# Patient Record
Sex: MALE | Race: WHITE | NOT HISPANIC OR LATINO | Employment: FULL TIME | URBAN - METROPOLITAN AREA
[De-identification: names, ages, dates, MRNs, and addresses within clinical notes are randomized per-mention and may not be internally consistent; named-entity substitution may affect disease eponyms.]

---

## 2017-07-25 ENCOUNTER — APPOINTMENT (EMERGENCY)
Dept: CT IMAGING | Facility: HOSPITAL | Age: 32
DRG: 465 | End: 2017-07-25
Payer: COMMERCIAL

## 2017-07-25 ENCOUNTER — HOSPITAL ENCOUNTER (INPATIENT)
Facility: HOSPITAL | Age: 32
LOS: 1 days | Discharge: HOME | DRG: 465 | End: 2017-07-27
Attending: EMERGENCY MEDICINE | Admitting: GENERAL PRACTICE
Payer: COMMERCIAL

## 2017-07-25 DIAGNOSIS — N20.0 NEPHROLITHIASIS: Primary | ICD-10-CM

## 2017-07-25 DIAGNOSIS — N13.30 HYDRONEPHROSIS, LEFT: ICD-10-CM

## 2017-07-25 DIAGNOSIS — R52 INTRACTABLE PAIN: ICD-10-CM

## 2017-07-25 DIAGNOSIS — N20.0 NEPHROLITHIASIS: ICD-10-CM

## 2017-07-25 PROBLEM — N20.1 URETERIC STONE: Status: ACTIVE | Noted: 2017-07-25

## 2017-07-25 PROBLEM — I10 ACCELERATED HYPERTENSION: Status: ACTIVE | Noted: 2017-07-25

## 2017-07-25 PROBLEM — J45.909 ASTHMA: Status: ACTIVE | Noted: 2017-07-25

## 2017-07-25 PROBLEM — N17.9 AKI (ACUTE KIDNEY INJURY) (HCC): Status: ACTIVE | Noted: 2017-07-25

## 2017-07-25 PROBLEM — E66.01 MORBID OBESITY (HCC): Status: ACTIVE | Noted: 2017-07-25

## 2017-07-25 LAB
ALBUMIN SERPL BCP-MCNC: 4.2 G/DL (ref 3.5–5)
ALP SERPL-CCNC: 60 U/L (ref 46–116)
ALT SERPL W P-5'-P-CCNC: 80 U/L (ref 12–78)
ANION GAP SERPL CALCULATED.3IONS-SCNC: 12 MMOL/L (ref 4–13)
AST SERPL W P-5'-P-CCNC: 45 U/L (ref 5–45)
BACTERIA UR QL AUTO: NORMAL /HPF
BASOPHILS # BLD AUTO: 0.05 THOUSANDS/ΜL (ref 0–0.1)
BASOPHILS NFR BLD AUTO: 0 % (ref 0–1)
BILIRUB SERPL-MCNC: 0.4 MG/DL (ref 0.2–1)
BILIRUB UR QL STRIP: NEGATIVE
BUN SERPL-MCNC: 13 MG/DL (ref 5–25)
CALCIUM SERPL-MCNC: 9.6 MG/DL (ref 8.3–10.1)
CHLORIDE SERPL-SCNC: 101 MMOL/L (ref 100–108)
CLARITY UR: CLEAR
CO2 SERPL-SCNC: 27 MMOL/L (ref 21–32)
COLOR UR: YELLOW
CREAT SERPL-MCNC: 1.24 MG/DL (ref 0.6–1.3)
EOSINOPHIL # BLD AUTO: 0.04 THOUSAND/ΜL (ref 0–0.61)
EOSINOPHIL NFR BLD AUTO: 0 % (ref 0–6)
ERYTHROCYTE [DISTWIDTH] IN BLOOD BY AUTOMATED COUNT: 12.5 % (ref 11.6–15.1)
GFR SERPL CREATININE-BSD FRML MDRD: 78 ML/MIN/1.73SQ M
GLUCOSE SERPL-MCNC: 155 MG/DL (ref 65–140)
GLUCOSE UR STRIP-MCNC: NEGATIVE MG/DL
HCT VFR BLD AUTO: 43.2 % (ref 36.5–49.3)
HGB BLD-MCNC: 14.4 G/DL (ref 12–17)
HGB UR QL STRIP.AUTO: ABNORMAL
KETONES UR STRIP-MCNC: NEGATIVE MG/DL
LEUKOCYTE ESTERASE UR QL STRIP: NEGATIVE
LIPASE SERPL-CCNC: 105 U/L (ref 73–393)
LYMPHOCYTES # BLD AUTO: 1.75 THOUSANDS/ΜL (ref 0.6–4.47)
LYMPHOCYTES NFR BLD AUTO: 15 % (ref 14–44)
MCH RBC QN AUTO: 28 PG (ref 26.8–34.3)
MCHC RBC AUTO-ENTMCNC: 33.3 G/DL (ref 31.4–37.4)
MCV RBC AUTO: 84 FL (ref 82–98)
MONOCYTES # BLD AUTO: 0.62 THOUSAND/ΜL (ref 0.17–1.22)
MONOCYTES NFR BLD AUTO: 5 % (ref 4–12)
NEUTROPHILS # BLD AUTO: 9.39 THOUSANDS/ΜL (ref 1.85–7.62)
NEUTS SEG NFR BLD AUTO: 79 % (ref 43–75)
NITRITE UR QL STRIP: NEGATIVE
NON-SQ EPI CELLS URNS QL MICRO: NORMAL /HPF
NRBC BLD AUTO-RTO: 0 /100 WBCS
PH UR STRIP.AUTO: 5.5 [PH] (ref 4.5–8)
PLATELET # BLD AUTO: 375 THOUSANDS/UL (ref 149–390)
PMV BLD AUTO: 9.7 FL (ref 8.9–12.7)
POTASSIUM SERPL-SCNC: 4.3 MMOL/L (ref 3.5–5.3)
PROT SERPL-MCNC: 8.9 G/DL (ref 6.4–8.2)
PROT UR STRIP-MCNC: ABNORMAL MG/DL
RBC # BLD AUTO: 5.15 MILLION/UL (ref 3.88–5.62)
RBC #/AREA URNS AUTO: NORMAL /HPF
SODIUM SERPL-SCNC: 140 MMOL/L (ref 136–145)
SP GR UR STRIP.AUTO: >=1.03 (ref 1–1.03)
URATE CRY URNS QL MICRO: NORMAL /HPF
UROBILINOGEN UR QL STRIP.AUTO: 0.2 E.U./DL
WBC # BLD AUTO: 11.89 THOUSAND/UL (ref 4.31–10.16)
WBC #/AREA URNS AUTO: NORMAL /HPF

## 2017-07-25 PROCEDURE — 99285 EMERGENCY DEPT VISIT HI MDM: CPT

## 2017-07-25 PROCEDURE — 81001 URINALYSIS AUTO W/SCOPE: CPT | Performed by: PHYSICIAN ASSISTANT

## 2017-07-25 PROCEDURE — 96374 THER/PROPH/DIAG INJ IV PUSH: CPT

## 2017-07-25 PROCEDURE — 36415 COLL VENOUS BLD VENIPUNCTURE: CPT

## 2017-07-25 PROCEDURE — 96375 TX/PRO/DX INJ NEW DRUG ADDON: CPT

## 2017-07-25 PROCEDURE — 80053 COMPREHEN METABOLIC PANEL: CPT | Performed by: EMERGENCY MEDICINE

## 2017-07-25 PROCEDURE — 96376 TX/PRO/DX INJ SAME DRUG ADON: CPT

## 2017-07-25 PROCEDURE — 83690 ASSAY OF LIPASE: CPT | Performed by: EMERGENCY MEDICINE

## 2017-07-25 PROCEDURE — 85025 COMPLETE CBC W/AUTO DIFF WBC: CPT | Performed by: EMERGENCY MEDICINE

## 2017-07-25 PROCEDURE — 74176 CT ABD & PELVIS W/O CONTRAST: CPT

## 2017-07-25 RX ORDER — ONDANSETRON 2 MG/ML
4 INJECTION INTRAMUSCULAR; INTRAVENOUS EVERY 6 HOURS PRN
Status: DISCONTINUED | OUTPATIENT
Start: 2017-07-25 | End: 2017-07-27 | Stop reason: HOSPADM

## 2017-07-25 RX ORDER — KETOROLAC TROMETHAMINE 30 MG/ML
INJECTION, SOLUTION INTRAMUSCULAR; INTRAVENOUS
Status: COMPLETED
Start: 2017-07-25 | End: 2017-07-25

## 2017-07-25 RX ORDER — KETOROLAC TROMETHAMINE 30 MG/ML
15 INJECTION, SOLUTION INTRAMUSCULAR; INTRAVENOUS EVERY 6 HOURS SCHEDULED
Status: DISCONTINUED | OUTPATIENT
Start: 2017-07-25 | End: 2017-07-26

## 2017-07-25 RX ORDER — KETOROLAC TROMETHAMINE 30 MG/ML
15 INJECTION, SOLUTION INTRAMUSCULAR; INTRAVENOUS ONCE
Status: COMPLETED | OUTPATIENT
Start: 2017-07-25 | End: 2017-07-25

## 2017-07-25 RX ORDER — SODIUM CHLORIDE 9 MG/ML
150 INJECTION, SOLUTION INTRAVENOUS CONTINUOUS
Status: DISCONTINUED | OUTPATIENT
Start: 2017-07-25 | End: 2017-07-27 | Stop reason: HOSPADM

## 2017-07-25 RX ORDER — OXYCODONE HYDROCHLORIDE AND ACETAMINOPHEN 5; 325 MG/1; MG/1
1 TABLET ORAL EVERY 4 HOURS PRN
Status: DISCONTINUED | OUTPATIENT
Start: 2017-07-25 | End: 2017-07-27 | Stop reason: HOSPADM

## 2017-07-25 RX ORDER — TAMSULOSIN HYDROCHLORIDE 0.4 MG/1
0.4 CAPSULE ORAL
Status: DISCONTINUED | OUTPATIENT
Start: 2017-07-25 | End: 2017-07-27 | Stop reason: HOSPADM

## 2017-07-25 RX ORDER — ACETAMINOPHEN 325 MG/1
650 TABLET ORAL EVERY 6 HOURS PRN
Status: DISCONTINUED | OUTPATIENT
Start: 2017-07-25 | End: 2017-07-27 | Stop reason: HOSPADM

## 2017-07-25 RX ORDER — HEPARIN SODIUM 5000 [USP'U]/ML
5000 INJECTION, SOLUTION INTRAVENOUS; SUBCUTANEOUS EVERY 8 HOURS SCHEDULED
Status: DISCONTINUED | OUTPATIENT
Start: 2017-07-25 | End: 2017-07-27 | Stop reason: HOSPADM

## 2017-07-25 RX ORDER — GREEN TEA/HOODIA GORDONII 315-12.5MG
CAPSULE ORAL DAILY
Status: DISCONTINUED | OUTPATIENT
Start: 2017-07-26 | End: 2017-07-26

## 2017-07-25 RX ADMIN — HYDROMORPHONE HYDROCHLORIDE 1 MG: 1 INJECTION, SOLUTION INTRAMUSCULAR; INTRAVENOUS; SUBCUTANEOUS at 13:19

## 2017-07-25 RX ADMIN — SODIUM CHLORIDE 150 ML/HR: 0.9 INJECTION, SOLUTION INTRAVENOUS at 19:57

## 2017-07-25 RX ADMIN — KETOROLAC TROMETHAMINE 15 MG: 30 INJECTION, SOLUTION INTRAMUSCULAR; INTRAVENOUS at 13:18

## 2017-07-25 RX ADMIN — HYDROMORPHONE HYDROCHLORIDE 1 MG: 1 INJECTION, SOLUTION INTRAMUSCULAR; INTRAVENOUS; SUBCUTANEOUS at 17:10

## 2017-07-25 RX ADMIN — KETOROLAC TROMETHAMINE 15 MG: 30 INJECTION, SOLUTION INTRAMUSCULAR at 16:12

## 2017-07-25 RX ADMIN — TAMSULOSIN HYDROCHLORIDE 0.4 MG: 0.4 CAPSULE ORAL at 19:57

## 2017-07-25 RX ADMIN — HYDROMORPHONE HYDROCHLORIDE 1 MG: 1 INJECTION, SOLUTION INTRAMUSCULAR; INTRAVENOUS; SUBCUTANEOUS at 14:26

## 2017-07-25 RX ADMIN — ONDANSETRON 4 MG: 2 INJECTION INTRAMUSCULAR; INTRAVENOUS at 20:04

## 2017-07-25 RX ADMIN — Medication 1 MG: at 13:19

## 2017-07-25 RX ADMIN — HYDROMORPHONE HYDROCHLORIDE 0.5 MG: 1 INJECTION, SOLUTION INTRAMUSCULAR; INTRAVENOUS; SUBCUTANEOUS at 21:21

## 2017-07-25 RX ADMIN — KETOROLAC TROMETHAMINE 15 MG: 30 INJECTION, SOLUTION INTRAMUSCULAR at 23:46

## 2017-07-25 RX ADMIN — KETOROLAC TROMETHAMINE 15 MG: 30 INJECTION, SOLUTION INTRAMUSCULAR at 19:56

## 2017-07-25 RX ADMIN — OXYCODONE HYDROCHLORIDE AND ACETAMINOPHEN 1 TABLET: 5; 325 TABLET ORAL at 22:04

## 2017-07-25 RX ADMIN — KETOROLAC TROMETHAMINE 15 MG: 30 INJECTION, SOLUTION INTRAMUSCULAR at 13:18

## 2017-07-25 NOTE — ED PROVIDER NOTES
History  Chief Complaint   Patient presents with    Flank Pain     Left flank pain started this morning  Not relieved by anything, pt reports vomiting when he tried to drink water  This is a 45-year-old male patient presents to ER for evaluation of sudden onset left-sided flank pain  Woke him from sleep this morning  Pain is left upper flank  Has been constant since onset but waxes and wanes in intensity  At times he states he is able tolerate the pain at times doubles over  He's had nausea with vomiting  States he feels that he needs to urinate but is unable to  No burning with urination  No blood in the urine that he notes  Pain radiates into the groin  Feels a stabbing pain  10 out of 10 at maximal severity  These had this on for 4 prior occasions however normally subsides spontaneously  Patient never had evaluation for this in the past  No known history of kidney stones  Father has a history of renal carcinoma he is otherwise healthy  History provided by:  Patient   used: No    Flank Pain   Pain location:  L flank  Pain quality: stabbing    Pain radiation: left groin    Pain severity:  Severe  Onset quality:  Sudden  Duration:  3 hours  Timing:  Constant  Progression:  Waxing and waning  Chronicity:  New  Context: not alcohol use, not awakening from sleep, not diet changes, not eating, not laxative use, not medication withdrawal, not previous surgeries, not recent illness, not recent sexual activity, not recent travel, not retching, not sick contacts, not suspicious food intake and not trauma    Relieved by:  Nothing  Worsened by:  Nothing  Ineffective treatments:  None tried  Associated symptoms: nausea and vomiting    Associated symptoms: no anorexia, no belching, no chest pain, no chills, no constipation, no cough, no diarrhea, no dysuria, no fatigue, no fever, no flatus, no hematemesis, no hematochezia, no hematuria, no melena, no shortness of breath and no sore throat Risk factors: obesity    Risk factors: no alcohol abuse, no aspirin use, not elderly, has not had multiple surgeries, no NSAID use and no recent hospitalization        Prior to Admission Medications   Prescriptions Last Dose Informant Patient Reported? Taking? HAWTHORN BERRIES PO   Yes Yes   Sig: Take 60 mL by mouth daily      Facility-Administered Medications: None       Past Medical History:   Diagnosis Date    Asthma        History reviewed  No pertinent surgical history  Family History   Problem Relation Age of Onset    Family history unknown: Yes     I have reviewed and agree with the history as documented  Social History   Substance Use Topics    Smoking status: Never Smoker    Smokeless tobacco: Not on file    Alcohol use No        Review of Systems   Constitutional: Negative for activity change, appetite change, chills, diaphoresis, fatigue, fever and unexpected weight change  HENT: Negative for congestion, rhinorrhea, sinus pressure, sore throat and trouble swallowing  Eyes: Negative for photophobia and visual disturbance  Respiratory: Negative for apnea, cough, choking, chest tightness, shortness of breath, wheezing and stridor  Cardiovascular: Negative for chest pain, palpitations and leg swelling  Gastrointestinal: Positive for nausea and vomiting  Negative for abdominal distention, abdominal pain, anorexia, blood in stool, constipation, diarrhea, flatus, hematemesis, hematochezia and melena  Genitourinary: Positive for flank pain  Negative for decreased urine volume, difficulty urinating, dysuria, enuresis, frequency, hematuria and urgency  Musculoskeletal: Negative for arthralgias, myalgias, neck pain and neck stiffness  Skin: Negative for color change, pallor, rash and wound  Allergic/Immunologic: Negative  Neurological: Negative for dizziness, tremors, syncope, weakness, light-headedness, numbness and headaches  Hematological: Negative  Psychiatric/Behavioral: Negative  All other systems reviewed and are negative  Physical Exam  ED Triage Vitals [07/25/17 1244]   Temperature Pulse Respirations Blood Pressure SpO2   97 9 °F (36 6 °C) 67 20 161/87 98 %      Temp Source Heart Rate Source Patient Position BP Location FiO2 (%)   Oral Monitor Lying Right arm --      Pain Score       Worst Possible Pain           Physical Exam   Constitutional: He is oriented to person, place, and time  He appears well-developed and well-nourished  Non-toxic appearance  He does not have a sickly appearance  He does not appear ill  No distress  HENT:   Head: Normocephalic and atraumatic  Eyes: EOM and lids are normal  Pupils are equal, round, and reactive to light  Neck: Normal range of motion  Neck supple  Cardiovascular: Normal rate, regular rhythm, S1 normal, S2 normal, normal heart sounds, intact distal pulses and normal pulses  Exam reveals no gallop, no distant heart sounds, no friction rub and no decreased pulses  No murmur heard  Pulses:       Radial pulses are 2+ on the right side, and 2+ on the left side  Pulmonary/Chest: Effort normal and breath sounds normal  No accessory muscle usage  No apnea, no tachypnea and no bradypnea  No respiratory distress  He has no decreased breath sounds  He has no wheezes  He has no rhonchi  He has no rales  Abdominal: Soft  Normal appearance and bowel sounds are normal  He exhibits no distension and no mass  There is no tenderness  There is CVA tenderness (left)  There is no rigidity, no rebound and no guarding  No hernia  Left-sided CVA tenderness  No abdominal tenderness  Musculoskeletal: Normal range of motion  He exhibits no edema, tenderness or deformity  Neurological: He is alert and oriented to person, place, and time  No cranial nerve deficit  GCS eye subscore is 4  GCS verbal subscore is 5  GCS motor subscore is 6  Skin: Skin is warm, dry and intact  No rash noted   He is not diaphoretic  No erythema  No pallor  Psychiatric: His speech is normal    Nursing note and vitals reviewed        ED Medications  Medications   [place] CAPS (not administered)   sodium chloride 0 9 % infusion (not administered)   ondansetron (ZOFRAN) injection 4 mg (not administered)   heparin (porcine) subcutaneous injection 5,000 Units (not administered)   acetaminophen (TYLENOL) tablet 650 mg (not administered)   ketorolac (TORADOL) 30 mg/mL injection 15 mg (not administered)   HYDROmorphone (DILAUDID) 1 mg/mL injection 0 5 mg (not administered)   tamsulosin (FLOMAX) capsule 0 4 mg (not administered)   oxyCODONE-acetaminophen (PERCOCET) 5-325 mg per tablet 1 tablet (not administered)   HYDROmorphone (DILAUDID) 1 mg/mL injection 1 mg (1 mg Intravenous Given 7/25/17 1319)   ketorolac (TORADOL) 30 mg/mL injection 15 mg (15 mg Intravenous Given 7/25/17 1318)   HYDROmorphone (DILAUDID) 1 mg/mL injection 1 mg (1 mg Intravenous Given 7/25/17 1426)   ketorolac (TORADOL) 30 mg/mL injection 15 mg (15 mg Intravenous Given 7/25/17 1612)   HYDROmorphone (DILAUDID) 1 mg/mL injection 1 mg (1 mg Intravenous Given 7/25/17 1710)       Diagnostic Studies  Labs Reviewed   CBC AND DIFFERENTIAL - Abnormal        Result Value Ref Range Status    WBC 11 89 (*) 4 31 - 10 16 Thousand/uL Final    Neutrophils Relative 79 (*) 43 - 75 % Final    Neutrophils Absolute 9 39 (*) 1 85 - 7 62 Thousands/µL Final    RBC 5 15  3 88 - 5 62 Million/uL Final    Hemoglobin 14 4  12 0 - 17 0 g/dL Final    Hematocrit 43 2  36 5 - 49 3 % Final    MCV 84  82 - 98 fL Final    MCH 28 0  26 8 - 34 3 pg Final    MCHC 33 3  31 4 - 37 4 g/dL Final    RDW 12 5  11 6 - 15 1 % Final    MPV 9 7  8 9 - 12 7 fL Final    Platelets 451  563 - 390 Thousands/uL Final    nRBC 0  /100 WBCs Final    Lymphocytes Relative 15  14 - 44 % Final    Monocytes Relative 5  4 - 12 % Final    Eosinophils Relative 0  0 - 6 % Final    Basophils Relative 0  0 - 1 % Final Lymphocytes Absolute 1 75  0 60 - 4 47 Thousands/µL Final    Monocytes Absolute 0 62  0 17 - 1 22 Thousand/µL Final    Eosinophils Absolute 0 04  0 00 - 0 61 Thousand/µL Final    Basophils Absolute 0 05  0 00 - 0 10 Thousands/µL Final   COMPREHENSIVE METABOLIC PANEL - Abnormal     Glucose 155 (*) 65 - 140 mg/dL Final    Comment: If the patient is fasting, the ADA then defines impaired fasting glucose as > 100 mg/dL and diabetes as > or equal to 123 mg/dL  ALT 80 (*) 12 - 78 U/L Final    Total Protein 8 9 (*) 6 4 - 8 2 g/dL Final    Sodium 140  136 - 145 mmol/L Final    Potassium 4 3  3 5 - 5 3 mmol/L Final    Chloride 101  100 - 108 mmol/L Final    CO2 27  21 - 32 mmol/L Final    Anion Gap 12  4 - 13 mmol/L Final    BUN 13  5 - 25 mg/dL Final    Creatinine 1 24  0 60 - 1 30 mg/dL Final    Comment: Standardized to IDMS reference method    Calcium 9 6  8 3 - 10 1 mg/dL Final    AST 45  5 - 45 U/L Final    Alkaline Phosphatase 60  46 - 116 U/L Final    Albumin 4 2  3 5 - 5 0 g/dL Final    Total Bilirubin 0 40  0 20 - 1 00 mg/dL Final    eGFR 78  ml/min/1 73sq m Final    Narrative:     National Kidney Disease Education Program recommendations are as follows:  GFR calculation is accurate only with a steady state creatinine  Chronic Kidney disease less than 60 ml/min/1 73 sq  meters  Kidney failure less than 15 ml/min/1 73 sq  meters     UA W REFLEX TO MICROSCOPIC WITH REFLEX TO CULTURE - Abnormal     Protein, UA 30 (1+) (*) Negative mg/dl Final    Blood, UA Small (*) Negative Final    Color, UA Yellow   Final    Clarity, UA Clear   Final    Specific Gravity, UA >=1 030  1 003 - 1 030 Final    pH, UA 5 5  4 5 - 8 0 Final    Leukocytes, UA Negative  Negative Final    Nitrite, UA Negative  Negative Final    Glucose, UA Negative  Negative mg/dl Final    Ketones, UA Negative  Negative mg/dl Final    Urobilinogen, UA 0 2  0 2, 1 0 E U /dl E U /dl Final    Bilirubin, UA Negative  Negative Final   LIPASE - Normal    Lipase 105 73 - 393 u/L Final   URINE MICROSCOPIC    RBC, UA None Seen  None Seen /hpf Final    WBC, UA None Seen  None Seen /hpf Final    Epithelial Cells Occasional  None Seen, Occasional /hpf Final    Bacteria, UA None Seen  None Seen, Occasional /hpf Final    Uric Acid [place], UA Occasional  /hpf Final       CT renal stone study abdomen pelvis without contrast   Final Result      Mild left-sided hydronephrosis, the cause of which appears to be a 5 mm calculus within the proximal ureter  Bilateral subcentimeter nonobstructing intrarenal calculi  No free air or free fluid  Diffuse fatty infiltration of the liver  Workstation performed: [id number]             Procedures  Procedures      Phone Contacts  ED Phone Contact    ED Course  ED Course   Ramone Colon Documentation   Comment Time   Pain improved  Still mild pain 07/25 1423                               MDM  Number of Diagnoses or Management Options  Hydronephrosis, left: new and requires workup  Intractable pain: new and requires workup  Nephrolithiasis: new and requires workup  Diagnosis management comments: DDx including but not limited to: renal colic, pyelonephritis, UTI, GI etiology, appendicitis, diverticulitis, AAA, rhabdomyolysis, tumor  Amount and/or Complexity of Data Reviewed  Clinical lab tests: ordered and reviewed  Tests in the radiology section of CPT®: ordered and reviewed  Discuss the patient with other providers: yes  Independent visualization of images, tracings, or specimens: yes    Risk of Complications, Morbidity, and/or Mortality  Presenting problems: moderate  Management options: low  General comments: Vertigo with flank pain  Labs are unremarkable  Urine dip shows no signs of UTI  CT scan reveals left-sided 5 mm stone with mild hydro  Pain has been difficult to control  After 2 doses of Dilaudid he states his pain is only 6 out of 10   This compared to 10 out of 10 upon arrival  Discussed the case with urology  Urology explains that this could be a 50% chance the patient passed at this don't spontaneously  Given the patient required numerous doses of analgesia including Toradol and Dilaudid and concern the patient may have intractable pain and could have an obstructive stone  Patient will be admitted to the hospital  Urology states they will see the patient tomorrow in consult to evaluate for possible surgical intervention if needed  After speaking with internal medicine and it is agreed for observation admission  Patient is hemodynamically stable and in no distress at time of admission  Patient Progress  Patient progress: stable    CritCare Time    Disposition  Final diagnoses:   Nephrolithiasis - left   Hydronephrosis, left   Intractable pain     ED Disposition     ED Disposition Condition Comment    Admit  Case was discussed with Dr Oanh Peace and the patient's admission status was agreed to be Admission Status: observation status to the service of Dr Oanh Peace         Follow-up Information    None       Current Discharge Medication List      CONTINUE these medications which have NOT CHANGED    Details   HAWTHORN BERRIES PO Take 60 mL by mouth daily           No discharge procedures on file      ED Provider  Electronically Signed by       Melina Sorenson PA-C  07/25/17 6620

## 2017-07-25 NOTE — H&P
History and Physical - [facility] Internal Medicine    Patient Information: Karlee Joseph 29 y o  male MRN: [id number]  Unit/Bed#: -01 Encounter: [id number]  Admitting Physician: Marlen Dougherty MD  PCP: No primary care provider on file  Date of Admission:  07/25/17    Assessment/Plan:    Hospital Problem List:     Principal Problem:    Ureteric stone  Active Problems:    Hydronephrosis, left    Nephrolithiasis    ELINA (acute kidney injury)    Asthma    Morbid obesity    Accelerated hypertension    Present on Admission:   Ureteric stone   ELINA (acute kidney injury)   Asthma   Morbid obesity   Accelerated hypertension      Plan for the Primary Problem(s):  · Left ureteric stone causing left-sided hydronephrosis and severe left flank pain  Patient I believe has been having similar symptoms at least twice within the last one year  He also has kidney stones seen on the CAT scan  We'll control his pain with IV pain medications and oral if he isn't able to tolerate orally  The urology service was already called in from the ER  Would hydrate him with IV fluids  Would also put him on Flomax  Blood pressure on the higher side likely secondary to pain  Follow-up labs in the morning  May need intervention like cystoscopy/ureteroscopy with removal of the stone if it does not pass on its own or patient's symptoms get worse  I do not believe that he needs any IV antibiotics at this point  · asthma-currently stable  · Acute kidney injury  On IV fluids as above  · Accelerated hypertension secondary to pain  Was given some IV hydralazine p r n  For elevated blood pressures    Plan for Additional Problems:   · As above    VTE Prophylaxis: Heparin  / sequential compression device   Code Status: full code  POLST: There is no POLST form on file for this patient (pre-hospital)    Anticipated Length of Stay:  Patient will be admitted on an Observation basis with an anticipated length of stay of  Less than 2 midnights  Justification for Hospital Stay: left ureteric stone with hydronephrosis      Chief Complaint:   severe left flank pain    History of Present Illness:    Karlee Joseph is a 29 y o  male who presents with severe left flank pain  patient went to bed after work last night  He was woken up by the left flank pain  He had similar episode 6 months ago and 6 months before that although symptoms were not that bad and he got better area and this time his pain was severe and came to the ER  He also was nauseous and vomited couple of times  No hematemesis, melena, hematochezia  No hematuria  No fever or chills  His pain is at about t3-4/10 in intensity  It got better with pain medications  It was worse earlier  No chest pain  No shortness of breath  He has history of asthma and he has been taking some over-the-counter medication  He used to use albuterol in the past and has not required it for long time  No dizziness and has no headache          Review of Systems    All systems are reviewed  Positive as per history of presenting illness  Patient answered no to all other questions  Past Medical and Surgical History:     Past Medical History:   Diagnosis Date    Asthma        History reviewed  No pertinent surgical history  Meds/Allergies:    Prior to Admission medications    Medication Sig Start Date End Date Taking? 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  Patient Pre-hospital Living Situation: by himself  Patient Pre-hospital Level of Mobility:  independent  Patient Pre-hospital Diet Restrictions: none  Substance Use History:   History Alcohol Use No     History   Smoking Status    Never Smoker   Smokeless Tobacco    Not on file     History   Drug Use No       Family History: There is family history of kidney stones        Physical Exam      Vitals:   Blood Pressure: (!) 177/100 (07/25/17 1708)  Pulse: 78 (07/25/17 1708)  Temperature: 97 9 °F (36 6 °C) (07/25/17 1244)  Temp Source: Oral (07/25/17 1244)  Respirations: 20 (07/25/17 1708)  Weight - Scale: 136 kg (299 lb 13 2 oz) (07/25/17 1244)  SpO2: 96 % (07/25/17 1708)    Vital signs are reviewed as above  Constitutional: morbidly obese male who is laying in bed  He does not appear to be in any distressy  Eyes: EOM grossly intact  Conjunctivae slightly pale  Patient has anicteric sclera  HENT: Oropharynx are crowded and slightly dry  Did not notice any significant lesions on the tongue  Head normocephalic  Neck: Neck is obese and supple  Cardiac: I did not hear any rubs or gallop  Patient appears to be in sinus rhythm  Respiratory: Patient not in significant respiratory distress  Air entry in general is good  GI: Abdomen is  Obese and soft  He has left flank tenderness    Bowel sounds are adequate  I was not able to appreciate any hepatosplenomegaly  Neurologic:  Patient is awake and alert  Neurological examination is grossly intact  No obvious focal neurological deficit noticed  Skin: Skin is warm and dry  Psychiatric: Mood and affect are pleasant  Musculoskeletal  Patient moving all extremities   Extremities: Patient has no significant cyanosis, clubbing, or lower extremity edema           Additional Data:     Lab Results: I have personally reviewed pertinent reports          Results from last 7 days  Lab Units 07/25/17  1309   WBC Thousand/uL 11 89*   HEMOGLOBIN g/dL 14 4   HEMATOCRIT % 43 2   PLATELETS Thousands/uL 375   NEUTROS PCT % 79*   LYMPHS PCT % 15   MONOS PCT % 5   EOS PCT % 0       Results from last 7 days  Lab Units 07/25/17  1309   SODIUM mmol/L 140   POTASSIUM mmol/L 4  3   CHLORIDE mmol/L 101   CO2 mmol/L 27   BUN mg/dL 13   CREATININE mg/dL 1 24   CALCIUM mg/dL 9 6   TOTAL PROTEIN g/dL 8 9*   BILIRUBIN TOTAL mg/dL 0 40   ALK PHOS U/L 60   ALT U/L 80*   AST U/L 45   GLUCOSE RANDOM mg/dL 155*           Imaging: I have personally reviewed pertinent reports  Ct Renal Stone Study Abdomen Pelvis Without Contrast    Result Date: 7/25/2017  Narrative: CT ABDOMEN AND PELVIS WITHOUT IV CONTRAST - LOW DOSE RENAL STONE INDICATION: Left flank pain  COMPARISON: None  TECHNIQUE:  Low dose thin section CT examination of the abdomen and pelvis was performed without intravenous or oral contrast according to a protocol specifically designed to evaluate for urinary tract calculus  Reformatted images were created in axial,  sagittal, and coronal planes  Evaluation for pathology in the abdomen and pelvis that is unrelated to urinary tract calculi is limited  Radiation dose length product (DLP) for this visit:  928 mGy-cm   This examination, like all CT scans performed in the [facility], was performed utilizing techniques to minimize radiation dose exposure, including the use of iterative reconstruction and automated exposure control  FINDINGS: RIGHT KIDNEY AND URETER: There is a 4 mm nonobstructing calculus at the lower pole  No hydronephrosis or hydroureter  No perinephric collection  LEFT KIDNEY AND URETER: There is mild left-sided hydronephrosis, the cause of which appears to be a 5 mm calculus within the proximal ureter, 2 3 cm distal to the ureteropelvic junction  There is a 1 mm nonobstructing calculus at the lower pole  A 3 mm nonobstructing calculus at the midpole No perinephric collection  URINARY BLADDER: Unremarkable  No significant abnormality in the visualized lung bases  Limited low radiation dose noncontrast CT evaluation demonstrates no clinically significant abnormality of spleen, pancreas, or adrenal glands    There is diffuse fatty infiltration of the liver  No calcified gallstones or gallbladder wall thickening noted  No bowel obstruction  No ascites or lymphadenopathy  The appendix is well seen and there is no evidence of acute appendicitis  There is a tiny fat-containing umbilical hernia  No acute fracture or destructive osseous lesion is identified  There is a 15 mm lucency at the posterior aspect of the L4 vertebral body since is fat density and compatible with a hemangioma  Impression: Mild left-sided hydronephrosis, the cause of which appears to be a 5 mm calculus within the proximal ureter  Bilateral subcentimeter nonobstructing intrarenal calculi  No free air or free fluid  Diffuse fatty infiltration of the liver  Workstation performed: [id number]         [employer] Records Reviewed: No     ** Please Note: Dragon 360 Dictation voice to text software may have been used in the creation of this document   **

## 2017-07-26 ENCOUNTER — APPOINTMENT (OUTPATIENT)
Dept: RADIOLOGY | Facility: HOSPITAL | Age: 32
DRG: 465 | End: 2017-07-26
Payer: COMMERCIAL

## 2017-07-26 ENCOUNTER — ANESTHESIA (OUTPATIENT)
Dept: PERIOP | Facility: HOSPITAL | Age: 32
DRG: 465 | End: 2017-07-26
Payer: COMMERCIAL

## 2017-07-26 ENCOUNTER — ANESTHESIA EVENT (OUTPATIENT)
Dept: PERIOP | Facility: HOSPITAL | Age: 32
DRG: 465 | End: 2017-07-26
Payer: COMMERCIAL

## 2017-07-26 LAB
ANION GAP SERPL CALCULATED.3IONS-SCNC: 9 MMOL/L (ref 4–13)
BUN SERPL-MCNC: 18 MG/DL (ref 5–25)
CALCIUM SERPL-MCNC: 8.9 MG/DL (ref 8.3–10.1)
CHLORIDE SERPL-SCNC: 103 MMOL/L (ref 100–108)
CO2 SERPL-SCNC: 28 MMOL/L (ref 21–32)
CREAT SERPL-MCNC: 1.51 MG/DL (ref 0.6–1.3)
ERYTHROCYTE [DISTWIDTH] IN BLOOD BY AUTOMATED COUNT: 12.8 % (ref 11.6–15.1)
EST. AVERAGE GLUCOSE BLD GHB EST-MCNC: 126 MG/DL
GFR SERPL CREATININE-BSD FRML MDRD: 61 ML/MIN/1.73SQ M
GLUCOSE P FAST SERPL-MCNC: 130 MG/DL (ref 65–99)
GLUCOSE SERPL-MCNC: 130 MG/DL (ref 65–140)
HBA1C MFR BLD: 6 % (ref 4.2–6.3)
HCT VFR BLD AUTO: 42.1 % (ref 36.5–49.3)
HGB BLD-MCNC: 13.6 G/DL (ref 12–17)
MCH RBC QN AUTO: 27.8 PG (ref 26.8–34.3)
MCHC RBC AUTO-ENTMCNC: 32.3 G/DL (ref 31.4–37.4)
MCV RBC AUTO: 86 FL (ref 82–98)
PLATELET # BLD AUTO: 358 THOUSANDS/UL (ref 149–390)
PMV BLD AUTO: 10.3 FL (ref 8.9–12.7)
POTASSIUM SERPL-SCNC: 4.3 MMOL/L (ref 3.5–5.3)
RBC # BLD AUTO: 4.9 MILLION/UL (ref 3.88–5.62)
SODIUM SERPL-SCNC: 140 MMOL/L (ref 136–145)
TSH SERPL DL<=0.05 MIU/L-ACNC: 0.55 UIU/ML (ref 0.36–3.74)
WBC # BLD AUTO: 14.7 THOUSAND/UL (ref 4.31–10.16)

## 2017-07-26 PROCEDURE — 84443 ASSAY THYROID STIM HORMONE: CPT | Performed by: INTERNAL MEDICINE

## 2017-07-26 PROCEDURE — 74420 UROGRAPHY RTRGR +-KUB: CPT

## 2017-07-26 PROCEDURE — 87086 URINE CULTURE/COLONY COUNT: CPT | Performed by: UROLOGY

## 2017-07-26 PROCEDURE — 83036 HEMOGLOBIN GLYCOSYLATED A1C: CPT | Performed by: GENERAL PRACTICE

## 2017-07-26 PROCEDURE — 0T778DZ DILATION OF LEFT URETER WITH INTRALUMINAL DEVICE, VIA NATURAL OR ARTIFICIAL OPENING ENDOSCOPIC: ICD-10-PCS | Performed by: UROLOGY

## 2017-07-26 PROCEDURE — 85027 COMPLETE CBC AUTOMATED: CPT | Performed by: INTERNAL MEDICINE

## 2017-07-26 PROCEDURE — C1758 CATHETER, URETERAL: HCPCS | Performed by: UROLOGY

## 2017-07-26 PROCEDURE — C1769 GUIDE WIRE: HCPCS | Performed by: UROLOGY

## 2017-07-26 PROCEDURE — C2617 STENT, NON-COR, TEM W/O DEL: HCPCS | Performed by: UROLOGY

## 2017-07-26 PROCEDURE — 80048 BASIC METABOLIC PNL TOTAL CA: CPT | Performed by: INTERNAL MEDICINE

## 2017-07-26 DEVICE — STENT URETERAL 6 FR 28CM INLAY OPTIMA: Type: IMPLANTABLE DEVICE | Site: URETER | Status: FUNCTIONAL

## 2017-07-26 RX ORDER — CIPROFLOXACIN 500 MG/1
500 TABLET, FILM COATED ORAL 2 TIMES DAILY
Status: DISCONTINUED | OUTPATIENT
Start: 2017-07-26 | End: 2017-07-27 | Stop reason: HOSPADM

## 2017-07-26 RX ORDER — DIPHENOXYLATE HYDROCHLORIDE AND ATROPINE SULFATE 2.5; .025 MG/1; MG/1
1 TABLET ORAL DAILY
COMMUNITY
End: 2017-08-17

## 2017-07-26 RX ORDER — HYDROCODONE BITARTRATE AND ACETAMINOPHEN 5; 325 MG/1; MG/1
2 TABLET ORAL EVERY 4 HOURS PRN
Status: DISCONTINUED | OUTPATIENT
Start: 2017-07-26 | End: 2017-07-26 | Stop reason: ALTCHOICE

## 2017-07-26 RX ORDER — MAGNESIUM HYDROXIDE 1200 MG/15ML
LIQUID ORAL AS NEEDED
Status: DISCONTINUED | OUTPATIENT
Start: 2017-07-26 | End: 2017-07-26 | Stop reason: HOSPADM

## 2017-07-26 RX ORDER — SODIUM CHLORIDE, SODIUM LACTATE, POTASSIUM CHLORIDE, CALCIUM CHLORIDE 600; 310; 30; 20 MG/100ML; MG/100ML; MG/100ML; MG/100ML
INJECTION, SOLUTION INTRAVENOUS CONTINUOUS PRN
Status: DISCONTINUED | OUTPATIENT
Start: 2017-07-26 | End: 2017-07-26 | Stop reason: SURG

## 2017-07-26 RX ORDER — FENTANYL CITRATE/PF 50 MCG/ML
50 SYRINGE (ML) INJECTION
Status: DISCONTINUED | OUTPATIENT
Start: 2017-07-26 | End: 2017-07-26 | Stop reason: HOSPADM

## 2017-07-26 RX ORDER — PROPOFOL 10 MG/ML
INJECTION, EMULSION INTRAVENOUS AS NEEDED
Status: DISCONTINUED | OUTPATIENT
Start: 2017-07-26 | End: 2017-07-26 | Stop reason: SURG

## 2017-07-26 RX ORDER — SENNOSIDES 8.6 MG
1 TABLET ORAL 2 TIMES DAILY
Status: DISCONTINUED | OUTPATIENT
Start: 2017-07-26 | End: 2017-07-27 | Stop reason: HOSPADM

## 2017-07-26 RX ORDER — CIPROFLOXACIN 2 MG/ML
400 INJECTION, SOLUTION INTRAVENOUS EVERY 12 HOURS
Status: DISCONTINUED | OUTPATIENT
Start: 2017-07-26 | End: 2017-07-26 | Stop reason: ALTCHOICE

## 2017-07-26 RX ORDER — MIDAZOLAM HYDROCHLORIDE 1 MG/ML
INJECTION INTRAMUSCULAR; INTRAVENOUS AS NEEDED
Status: DISCONTINUED | OUTPATIENT
Start: 2017-07-26 | End: 2017-07-26 | Stop reason: SURG

## 2017-07-26 RX ORDER — METOCLOPRAMIDE HYDROCHLORIDE 5 MG/ML
10 INJECTION INTRAMUSCULAR; INTRAVENOUS ONCE AS NEEDED
Status: DISCONTINUED | OUTPATIENT
Start: 2017-07-26 | End: 2017-07-26 | Stop reason: HOSPADM

## 2017-07-26 RX ORDER — LABETALOL HYDROCHLORIDE 5 MG/ML
INJECTION, SOLUTION INTRAVENOUS AS NEEDED
Status: DISCONTINUED | OUTPATIENT
Start: 2017-07-26 | End: 2017-07-26 | Stop reason: SURG

## 2017-07-26 RX ORDER — COVID-19 ANTIGEN TEST
KIT MISCELLANEOUS
COMMUNITY
End: 2017-07-27 | Stop reason: HOSPADM

## 2017-07-26 RX ORDER — FENTANYL CITRATE 50 UG/ML
INJECTION, SOLUTION INTRAMUSCULAR; INTRAVENOUS AS NEEDED
Status: DISCONTINUED | OUTPATIENT
Start: 2017-07-26 | End: 2017-07-26 | Stop reason: SURG

## 2017-07-26 RX ORDER — LABETALOL HYDROCHLORIDE 5 MG/ML
10 INJECTION, SOLUTION INTRAVENOUS
Status: DISCONTINUED | OUTPATIENT
Start: 2017-07-26 | End: 2017-07-26 | Stop reason: HOSPADM

## 2017-07-26 RX ORDER — ONDANSETRON 2 MG/ML
INJECTION INTRAMUSCULAR; INTRAVENOUS AS NEEDED
Status: DISCONTINUED | OUTPATIENT
Start: 2017-07-26 | End: 2017-07-26 | Stop reason: SURG

## 2017-07-26 RX ORDER — LIDOCAINE HYDROCHLORIDE 10 MG/ML
INJECTION, SOLUTION INFILTRATION; PERINEURAL AS NEEDED
Status: DISCONTINUED | OUTPATIENT
Start: 2017-07-26 | End: 2017-07-26 | Stop reason: SURG

## 2017-07-26 RX ORDER — ONDANSETRON 2 MG/ML
4 INJECTION INTRAMUSCULAR; INTRAVENOUS ONCE AS NEEDED
Status: DISCONTINUED | OUTPATIENT
Start: 2017-07-26 | End: 2017-07-26 | Stop reason: HOSPADM

## 2017-07-26 RX ADMIN — FENTANYL CITRATE 50 MCG: 50 INJECTION, SOLUTION INTRAMUSCULAR; INTRAVENOUS at 18:37

## 2017-07-26 RX ADMIN — PROPOFOL 50 MG: 10 INJECTION, EMULSION INTRAVENOUS at 18:23

## 2017-07-26 RX ADMIN — SODIUM CHLORIDE 150 ML/HR: 0.9 INJECTION, SOLUTION INTRAVENOUS at 02:26

## 2017-07-26 RX ADMIN — HEPARIN SODIUM 5000 UNITS: 5000 INJECTION, SOLUTION INTRAVENOUS; SUBCUTANEOUS at 22:25

## 2017-07-26 RX ADMIN — ONDANSETRON 4 MG: 2 INJECTION INTRAMUSCULAR; INTRAVENOUS at 16:23

## 2017-07-26 RX ADMIN — MIDAZOLAM HYDROCHLORIDE 2 MG: 1 INJECTION, SOLUTION INTRAMUSCULAR; INTRAVENOUS at 18:17

## 2017-07-26 RX ADMIN — SODIUM CHLORIDE 150 ML/HR: 0.9 INJECTION, SOLUTION INTRAVENOUS at 10:57

## 2017-07-26 RX ADMIN — KETOROLAC TROMETHAMINE 15 MG: 30 INJECTION, SOLUTION INTRAMUSCULAR at 05:16

## 2017-07-26 RX ADMIN — PROPOFOL 100 MG: 10 INJECTION, EMULSION INTRAVENOUS at 18:22

## 2017-07-26 RX ADMIN — OXYCODONE HYDROCHLORIDE AND ACETAMINOPHEN 1 TABLET: 5; 325 TABLET ORAL at 06:51

## 2017-07-26 RX ADMIN — LABETALOL HYDROCHLORIDE 5 MG: 5 INJECTION, SOLUTION INTRAVENOUS at 19:02

## 2017-07-26 RX ADMIN — HYDROMORPHONE HYDROCHLORIDE 0.5 MG: 1 INJECTION, SOLUTION INTRAMUSCULAR; INTRAVENOUS; SUBCUTANEOUS at 10:55

## 2017-07-26 RX ADMIN — DEXAMETHASONE SODIUM PHOSPHATE 4 MG: 10 INJECTION INTRAMUSCULAR; INTRAVENOUS at 18:24

## 2017-07-26 RX ADMIN — HYDROMORPHONE HYDROCHLORIDE 0.5 MG: 1 INJECTION, SOLUTION INTRAMUSCULAR; INTRAVENOUS; SUBCUTANEOUS at 03:19

## 2017-07-26 RX ADMIN — SODIUM CHLORIDE, SODIUM LACTATE, POTASSIUM CHLORIDE, AND CALCIUM CHLORIDE: .6; .31; .03; .02 INJECTION, SOLUTION INTRAVENOUS at 16:59

## 2017-07-26 RX ADMIN — SENNOSIDES 8.6 MG: 8.6 TABLET, FILM COATED ORAL at 09:21

## 2017-07-26 RX ADMIN — FENTANYL CITRATE 50 MCG: 50 INJECTION, SOLUTION INTRAMUSCULAR; INTRAVENOUS at 18:24

## 2017-07-26 RX ADMIN — CIPROFLOXACIN 400 MG: 2 INJECTION, SOLUTION INTRAVENOUS at 09:21

## 2017-07-26 RX ADMIN — TAMSULOSIN HYDROCHLORIDE 0.4 MG: 0.4 CAPSULE ORAL at 15:43

## 2017-07-26 RX ADMIN — HYDROMORPHONE HYDROCHLORIDE 0.5 MG: 1 INJECTION, SOLUTION INTRAMUSCULAR; INTRAVENOUS; SUBCUTANEOUS at 15:43

## 2017-07-26 RX ADMIN — CIPROFLOXACIN 500 MG: 500 TABLET, FILM COATED ORAL at 20:51

## 2017-07-26 RX ADMIN — PROPOFOL 200 MG: 10 INJECTION, EMULSION INTRAVENOUS at 18:19

## 2017-07-26 RX ADMIN — OXYCODONE HYDROCHLORIDE AND ACETAMINOPHEN 1 TABLET: 5; 325 TABLET ORAL at 12:45

## 2017-07-26 RX ADMIN — ONDANSETRON 4 MG: 2 INJECTION INTRAMUSCULAR; INTRAVENOUS at 18:59

## 2017-07-26 RX ADMIN — CIPROFLOXACIN 400 MG: 2 INJECTION INTRAVENOUS at 18:25

## 2017-07-26 RX ADMIN — OXYCODONE HYDROCHLORIDE AND ACETAMINOPHEN 1 TABLET: 5; 325 TABLET ORAL at 02:26

## 2017-07-26 RX ADMIN — LIDOCAINE HYDROCHLORIDE 100 MG: 10 INJECTION, SOLUTION INFILTRATION; PERINEURAL at 18:18

## 2017-07-26 RX ADMIN — HYDROMORPHONE HYDROCHLORIDE 0.5 MG: 1 INJECTION, SOLUTION INTRAMUSCULAR; INTRAVENOUS; SUBCUTANEOUS at 07:32

## 2017-07-26 RX ADMIN — HYDROMORPHONE HYDROCHLORIDE 0.5 MG: 1 INJECTION, SOLUTION INTRAMUSCULAR; INTRAVENOUS; SUBCUTANEOUS at 20:49

## 2017-07-26 NOTE — PLAN OF CARE
Problem: PAIN - ADULT  Goal: Verbalizes/displays adequate comfort level or baseline comfort level  Interventions:  - Encourage patient to monitor pain and request assistance  - Assess pain using appropriate pain scale  - Administer analgesics based on type and severity of pain and evaluate response  - Implement non-pharmacological measures as appropriate and evaluate response  - Consider cultural and social influences on pain and pain management  - Notify physician/advanced practitioner if interventions unsuccessful or patient reports new pain   Outcome: Progressing      Problem: INFECTION - ADULT  Goal: Absence or prevention of progression during hospitalization  INTERVENTIONS:  - Assess and monitor for signs and symptoms of infection  - Monitor lab/diagnostic results  - Monitor all insertion sites, i e  indwelling lines, tubes, and drains  - Monitor endotracheal (as able) and nasal secretions for changes in amount and color  - [place] appropriate cooling/warming therapies per order  - Administer medications as ordered  - Instruct and encourage patient and family to use good hand hygiene technique  - Identify and instruct in appropriate isolation precautions for identified infection/condition   Outcome: Progressing    Goal: Absence of fever/infection during neutropenic period  INTERVENTIONS:  - Monitor WBC  - Implement neutropenic guidelines   Outcome: Progressing      Problem: SAFETY ADULT  Goal: Patient will remain free of falls  INTERVENTIONS:  - Assess patient frequently for physical needs  -  Identify cognitive and physical deficits and behaviors that affect risk of falls    -  [place] fall precautions as indicated by assessment   - Educate patient/family on patient safety including physical limitations  - Instruct patient to call for assistance with activity based on assessment  - Modify environment to reduce risk of injury  - Consider OT/PT consult to assist with strengthening/mobility   Outcome: Progressing    Goal: Maintain or return to baseline ADL function  INTERVENTIONS:  -  Assess patient's ability to carry out ADLs; assess patient's baseline for ADL function and identify physical deficits which impact ability to perform ADLs (bathing, care of mouth/teeth, toileting, grooming, dressing, etc )  - Assess/evaluate cause of self-care deficits   - Assess range of motion  - Assess patient's mobility; develop plan if impaired  - Assess patient's need for assistive devices and provide as appropriate  - Encourage maximum independence but intervene and supervise when necessary  ¯ Involve family in performance of ADLs  ¯ Assess for home care needs following discharge   ¯ Request OT consult to assist with ADL evaluation and planning for discharge  ¯ Provide patient education as appropriate   Outcome: Progressing    Goal: Maintain or return mobility status to optimal level  INTERVENTIONS:  - Assess patient's baseline mobility status (ambulation, transfers, stairs, etc )    - Identify cognitive and physical deficits and behaviors that affect mobility  - Identify mobility aids required to assist with transfers and/or ambulation (gait belt, sit-to-stand, lift, walker, cane, etc )  - [place] fall precautions as indicated by assessment  - Record patient progress and toleration of activity level on Mobility SBAR; progress patient to next Phase/Stage  - Instruct patient to call for assistance with activity based on assessment  - Request Rehabilitation consult to assist with strengthening/weightbearing, etc    Outcome: Progressing      Problem: DISCHARGE PLANNING  Goal: Discharge to home or other facility with appropriate resources  INTERVENTIONS:  - Identify barriers to discharge w/patient and caregiver  - Arrange for needed discharge resources and transportation as appropriate  - Identify discharge learning needs (meds, wound care, etc )  - Arrange for interpretive services to assist at discharge as needed  - Refer to Case Management Department for coordinating discharge planning if the patient needs post-hospital services based on physician/advanced practitioner order or complex needs related to functional status, cognitive ability, or social support system   Outcome: Progressing      Problem: Knowledge Deficit  Goal: Patient/family/caregiver demonstrates understanding of disease process, treatment plan, medications, and discharge instructions  Complete learning assessment and assess knowledge base    Interventions:  - Provide teaching at level of understanding  - Provide teaching via preferred learning methods   Outcome: Progressing

## 2017-07-26 NOTE — OP NOTE
OPERATIVE REPORT  PATIENT NAME: Kem Robledo    :  1985  MRN: [id number]  Pt Location: [facility] OR ROOM 04    SURGERY DATE: 2017    Surgeon(s) and Role:     * Nelson Dowd MD - Primary    Preop Diagnosis:  Nephrolithiasis [N20 0]  Left proximal ureteral calculus    Post-Op Diagnosis Codes:     * Nephrolithiasis [N20 0]  Left proximal ureteral calculus      Procedure: Cystoscopy, left ureteroscopy      Specimen(s):  * No specimens in log *    Estimated Blood Loss:   Minimal    Drains:  L 28-6 Fr stent       Anesthesia Type:   General    Operative Indications:  5 mm left ureteral calculus    Operative Findings:  5 mm prox      Complications:   None      Procedure Description: Kem Robledo is a 29y o -year-old male  with a history of a left 5 mm ureteral calculus with intractable pain  Risk and benefits of ureteroscopy were discussed and reviewed  Informed consent was obtained  The patient was brought to the operating room on 2017  After the smooth induction of general LMA anesthesia, the patient was placed in the dorsal lithotomy position  His genitalia was prepped and draped in a sterile fashion  Intravenous antibiotics were administered  A timeout was performed with all members of the operative team confirmed the patient's identity, procedure to be performed, and laterality of the case  A 22 Indonesian rigid cystoscope with 30° lens was inserted  The bladder was thoroughly inspected  It was no evidence of mucosal abnormalities or lesions  There were no calculi identified  Attention was focused on the left ureteral orifice  A 5 Indonesian open-ended catheter was inserted and a left retrograde pyelogram was performed  A filling defect in the left proximal ureter was identified consistent with the stone  A wire was passed proximal to the stone and secured as a safety  A 12 Indonesian Patel catheter was inserted for continuous bladder drainage   A long semirigid ureteroscope was then inserted adjacent to the wire  The scope was placed all the way to the hub and I still could not engage the stone based on the patient's height and length of his ureter  I therefore placed a second wire followed by an access sheath followed by an Olympus flexible ureteroscope  The stone was pushed back into the kidney  The stone was identified in the mid pole calyx and decimated with a 200 µ holmium laser fiber until the fragments were small enough to be passable  The ureteroscope and sheath were then withdrawn through the ureter and the entire ureter was inspected to ensure no residual stones  The cystoscope was repassed into the bladder  A 28-6 English left double-J ureteral stent was then placed without difficulty  The proximal coil was appreciated in the left renal pelvis and the distal coil was visualized within the bladder  The bladder was emptied and the cystoscope was removed  A string was left in place and secured to the dorsum of the phallus with Tegaderm  Overall the patient tolerated the procedure well and were no complications  The patient was extubated in the operating room and transferred to the PACU in stable condition at the conclusion of the case        Patient Disposition:  PACU stable and extubated    SIGNATURE: Jude Valencia MD  DATE: July 26, 2017  TIME: 6:21 PM

## 2017-07-26 NOTE — PROGRESS NOTES
[facility] Progress Note  Patient: Pineda Morales 29 y o  male   MRN: [id number]  PCP: No primary care provider on file  Unit/Bed#: -01 Encounter: [id number]  Date Of Visit: 17    Assessment:    Principal Problem:    Ureteric stone  Active Problems:    Hydronephrosis, left    Nephrolithiasis    ELINA (acute kidney injury)    Asthma    Morbid obesity    Accelerated hypertension      Plan:      Plan for the Primary Problem(s): ? Left ureteric stone causing left-sided hydronephrosis/ELINA and  and severe left flank pain and cr elevated  wbc elevated today-start cipro; urology following and on flomax; d/c toradol  ? asthma-currently stable  ? Acute kidney injury  On IV fluids as above  ? Accelerated hypertension secondary to pain  Was given some IV hydralazine p r n  For elevated blood pressures    VTE Pharmacologic Prophylaxis:   Pharmacologic: Heparin  Mechanical VTE Prophylaxis in Place: Yes    Patient Centered Rounds: I have performed bedside rounds with nursing staff today  Discussions with Specialists or Other Care Team Provider:     Education and Discussions with Family / Patient:     Time Spent for Care: 30 minutes  More than 50% of total time spent on counseling and coordination of care as described above  Current Length of Stay: 0 day(s)    Current Patient Status: Observation   Certification Statement: The patient will continue to require additional inpatient hospital stay due to renal stones    Discharge Plan: home    Code Status: Level 1 - Full Code      Subjective:   Less pain today      Objective:     Vitals:   Temp (24hrs), Av 2 °F (36 8 °C), Min:97 9 °F (36 6 °C), Max:98 5 °F (36 9 °C)    HR:  [67-88] 81  Resp:  [18-20] 18  BP: (159-177)/() 159/87  SpO2:  [93 %-98 %] 95 %  Body mass index is 37 12 kg/m²       Input and Output Summary (last 24 hours):     No intake or output data in the 24 hours ending 17 0836    Physical Exam:     Physical Exam Constitutional: He is oriented to person, place, and time  He appears well-developed and well-nourished  HENT:   Head: Normocephalic and atraumatic  Eyes: Pupils are equal, round, and reactive to light  Neck: Neck supple  Cardiovascular: Normal rate and regular rhythm  Pulmonary/Chest: Effort normal and breath sounds normal    Abdominal: Soft  He exhibits no distension  There is no tenderness  Musculoskeletal: He exhibits no edema  Neurological: He is alert and oriented to person, place, and time  Additional Data:     Labs:      Results from last 7 days  Lab Units 07/26/17  0513 07/25/17  1309   WBC Thousand/uL 14 70* 11 89*   HEMOGLOBIN g/dL 13 6 14 4   HEMATOCRIT % 42 1 43 2   PLATELETS Thousands/uL 358 375   NEUTROS PCT %  --  79*   LYMPHS PCT %  --  15   MONOS PCT %  --  5   EOS PCT %  --  0       Results from last 7 days  Lab Units 07/26/17  0513 07/25/17  1309   SODIUM mmol/L 140 140   POTASSIUM mmol/L 4 3 4 3   CHLORIDE mmol/L 103 101   CO2 mmol/L 28 27   BUN mg/dL 18 13   CREATININE mg/dL 1 51* 1 24   CALCIUM mg/dL 8 9 9 6   TOTAL PROTEIN g/dL  --  8 9*   BILIRUBIN TOTAL mg/dL  --  0 40   ALK PHOS U/L  --  60   ALT U/L  --  80*   AST U/L  --  45   GLUCOSE RANDOM mg/dL 130 155*           * I Have Reviewed All Lab Data Listed Above  * Additional Pertinent Lab Tests Reviewed: All Labs Within Last 24 Hours Reviewed    Imaging:    Imaging Reports Reviewed Today Include:   Imaging Personally Reviewed by Myself Includes:      Recent Cultures (last 7 days):           Last 24 Hours Medication List:     Basin Berries  Oral Daily   heparin (porcine) 5,000 Units Subcutaneous Q8H [address]   ketorolac 15 mg Intravenous Q6H [address]   tamsulosin 0 4 mg Oral Daily With Dinner        Today, Patient Was Seen By: Megan Sumner MD    ** Please Note: Dragon 360 Dictation voice to text software may have been used in the creation of this document   **

## 2017-07-26 NOTE — CASE MANAGEMENT
[facility] in the [facility] by Sacha Gamboa for 2017  Network Utilization Review Department  Phone: [phone]; Fax [phone]  ATTENTION: The Network Utilization Review Department is now centralized for our 7 Facilities  Make a note that we have a new phone and fax numbers for our Department  Please call with any questions or concerns to [phone] and carefully follow the prompts so that you are directed to the right person  All voicemails are confidential  Fax any determinations, approvals, denials, and requests for initial or continue stay review clinical to [phone]  **Due to HIGH CALL volume, it would be easier if you could please send daily logs  This will expedite your requests and in part, help us provide discharge notifications faster  **    Initial Clinical Review    Admission: Date/Time/Statement: OBS 7/25/17 @1653    Orders Placed This Encounter   Procedures    Place in Observation (expected length of stay for this patient is less than two midnights)     Standing Status:   Standing     Number of Occurrences:   1     Order Specific Question:   Admitting Physician     Answer:   Yo Mercedes [[id number]]     Order Specific Question:   Level of Care     Answer:   Med Surg [16]     ED: Date/Time/Mode of Arrival:   ED Arrival Information     Expected Arrival Acuity Means of Arrival Escorted By Service Admission Type    - 7/25/2017 12:33 Urgent Wheelchair Self General Medicine Urgent    Arrival Complaint    ABDOMINAL PAIN        Chief Complaint:   Chief Complaint   Patient presents with    Flank Pain     Left flank pain started this morning  Not relieved by anything, pt reports vomiting when he tried to drink water  History of Illness: Savanna Mares is a 29 y o  male who presents with severe left flank pain  patient went to bed after work last night  He was woken up by the left flank pain   He had similar episode 6 months ago and 6 months before that although symptoms were not that bad and he got better area and this time his pain was severe and came to the ER  He also was nauseous and vomited couple of times  No hematemesis, melena, hematochezia  No hematuria  No fever or chills  His pain is at about t3-4/10 in intensity  It got better with pain medications  It was worse earlier  No chest pain  No shortness of breath  He has history of asthma and he has been taking some over-the-counter medication  He used to use albuterol in the past and has not required it for long time   No dizziness and has no headache       ED Vital Signs:   ED Triage Vitals [07/25/17 1244]   Temperature Pulse Respirations Blood Pressure SpO2   97 9 °F (36 6 °C) 67 20 161/87 98 %      Temp Source Heart Rate Source Patient Position BP Location FiO2 (%)   Oral Monitor Lying Right arm --      Pain Score       Worst Possible Pain        Wt Readings from Last 1 Encounters:   07/25/17 135 kg (296 lb 15 4 oz)       Vital Signs (abnormal): 177/96   177/100   176/94    Abnormal Labs/Diagnostic Test Results:   Gluc 155   Alt 80   t prot 8 9   Wbc 11 89  UA: sm bld   Sg>=1 030   +1 prot   occ uric acid/epithelials  7/26: creat 1 51   Gluc 130   Wbc 14 7  CT stone study: Mild left-sided hydronephrosis, the cause of which appears to be a 5 mm calculus within the proximal ureter  Bilateral subcentimeter nonobstructing intrarenal calculi  No free air or free fluid     Diffuse fatty infiltration of the liver        ED Treatment:   Medication Administration from 07/25/2017 1233 to 07/25/2017 1738       Date/Time Order Dose Route Action Action by Comments     07/25/2017 1319 HYDROmorphone (DILAUDID) 1 mg/mL injection 1 mg 1 mg Intravenous Given Sedrick Buckner RN      07/25/2017 1318 ketorolac (TORADOL) 30 mg/mL injection 15 mg 15 mg Intravenous Given Sedrick Buckner RN      07/25/2017 1426 HYDROmorphone (DILAUDID) 1 mg/mL injection 1 mg 1 mg Intravenous Given Suri Cervantes RN 07/25/2017 1612 ketorolac (TORADOL) 30 mg/mL injection 15 mg 15 mg Intravenous Given Naty Crowder RN      07/25/2017 1710 HYDROmorphone (DILAUDID) 1 mg/mL injection 1 mg 1 mg Intravenous Given Gina Bob RN           Past Medical/Surgical History: Active Ambulatory Problems     Diagnosis Date Noted    No Active Ambulatory Problems     Resolved Ambulatory Problems     Diagnosis Date Noted    No Resolved Ambulatory Problems     Past Medical History:   Diagnosis Date    Asthma        Admitting Diagnosis: Nephrolithiasis [N20 0]  Flank pain [R10 9]  Hydronephrosis, left [N13 30]  Intractable pain [R52]    Age/Sex: 29 y o  male    Assessment/Plan: Principal Problem:  Ureteric stone  Active Problems:  Hydronephrosis, left    Nephrolithiasis    ELINA (acute kidney injury)    Asthma    Morbid obesity    Accelerated hypertension     Present on Admission: Ureteric stone   ELINA (acute kidney injury)   Asthma   Morbid obesity   Accelerated hypertension      Plan for the Primary Problem(s):  · Left ureteric stone causing left-sided hydronephrosis and severe left flank pain  Patient I believe has been having similar symptoms at least twice within the last one year  He also has kidney stones seen on the CAT scan  We'll control his pain with IV pain medications and oral if he isn't able to tolerate orally  The urology service was already called in from the ER  Would hydrate him with IV fluids  Would also put him on Flomax  Blood pressure on the higher side likely secondary to pain  Follow-up labs in the morning  May need intervention like cystoscopy/ureteroscopy with removal of the stone if it does not pass on its own or patient's symptoms get worse  I do not believe that he needs any IV antibiotics at this point  ? asthma-currently stable  ? Acute kidney injury  On IV fluids as above  ? Accelerated hypertension secondary to pain  Was given some IV hydralazine p r n   For elevated blood pressures     Plan for Additional Problems:   · As above     VTE Prophylaxis: Heparin  / sequential compression device   Code Status: full code  POLST: There is no POLST form on file for this patient (pre-hospital)     Anticipated Length of Stay:  Patient will be admitted on an Observation basis with an anticipated length of stay of  Less than 2 midnights  Justification for Hospital Stay: left ureteric stone with hydronephrosis       Admission Orders:  Scheduled Meds:   ciprofloxacin 400 mg Intravenous Q12H   heparin (porcine) 5,000 Units Subcutaneous Q8H [address]   senna 1 tablet Oral BID   tamsulosin 0 4 mg Oral Daily With Dinner     Continuous Infusions:   sodium chloride 150 mL/hr Last Rate: 150 mL/hr (07/26/17 0226)     PRN Meds:   acetaminophen    HYDROmorphone    ondansetron    oxyCODONE-acetaminophen     NPO, sips clear liquids until midnight  Up w/assist  Bmp, cbc in am  bilat venodynes  Strain all urine      PER UROLOGY 7/26:  Assessment:  Left ureteral calculus with hydronephrosis  Plan:  IV hydration  If does not pass will proceed with cystoscopy with lithotripsy and stent    PER MED 7/26:  Plan for the Primary Problem(s): ? Left ureteric stone causing left-sided hydronephrosis/ELINA and  and severe left flank pain and cr elevated  wbc elevated today-start cipro; urology following and on flomax; d/c toradol  ? asthma-currently stable  ? Acute kidney injury  On IV fluids as above  ? Accelerated hypertension secondary to pain  Was given some IV hydralazine p r n   For elevated blood pressures  Certification Statement: The patient will continue to require additional hospital stay due to renal stones   Discharge Plan: home

## 2017-07-26 NOTE — PLAN OF CARE
DISCHARGE PLANNING     Discharge to home or other facility with appropriate resources Progressing        INFECTION - ADULT     Absence or prevention of progression during hospitalization Progressing     Absence of fever/infection during neutropenic period Progressing        Knowledge Deficit     Patient/family/caregiver demonstrates understanding of disease process, treatment plan, medications, and discharge instructions Progressing        PAIN - ADULT     Verbalizes/displays adequate comfort level or baseline comfort level Progressing        SAFETY ADULT     Patient will remain free of falls Progressing     Maintain or return to baseline ADL function Progressing     Maintain or return mobility status to optimal level Progressing

## 2017-07-26 NOTE — SOCIAL WORK
Cm met with patient at bedside, patient accompanied by his mother  Patient alert and oriented during interview and reports being independent with ADL's , no dme, vna or str  Patient reports filling his prescriptions at [facility] [address]  Patient reports he is currently employed needs to complete 90 day probationary period in order to recieve medical coverage  ([employer] already completed assessment with patient at bedside)  Patient mother stated family is motivated to complete application for medical coverage  Patient refused information for advanced directives  Patient mother stated she is able to assist patient with cost of medications upon discharge  OBS letter reviewed with patient and mother, letter signed by patient and copy provided for his records  No needs at this time

## 2017-07-26 NOTE — PLAN OF CARE
Problem: DISCHARGE PLANNING - CARE MANAGEMENT  Goal: Discharge to post-acute care or home with appropriate resources  INTERVENTIONS:  - Conduct assessment to determine patient/family and health care team treatment goals, and need for post-acute services based on payer coverage, community resources, and patient preferences, and barriers to discharge  - Address psychosocial, clinical, and financial barriers to discharge as identified in assessment in conjunction with the patient/family and health care team  - Arrange appropriate level of post-acute services according to patients   needs and preference and payer coverage in collaboration with the physician and health care team  - Communicate with and update the patient/family, physician, and health care team regarding progress on the discharge plan  - Arrange appropriate transportation to post-acute venues  Outcome: Progressing  obs letter reviewed, patient has family resources to assist with medications if needed upon discharge

## 2017-07-26 NOTE — CONSULTS
Consultation - Urology   Thania Glover 29 y o  male MRN: [id number]  Unit/Bed#: -01 Encounter: [id number]      Assessment/Plan      Assessment:  Left ureteral calculus with hydronephrosis  Plan:  IV hydration  If does not pass will proceed with cystoscopy with lithotripsy and stent    History of Present Illness   Attending: Roxy Rajan MD  Reason for Consult / Principal Problem: ureteral calculus with hydronethrosis  HPI: Thania Glover is a 29y o  year old male who presents with Left ureteric stone causing left-sided hydronephrosis and severe left flank pain  Patient I believe has been having similar symptoms at least twice within the last one year  He also has kidney stones seen on the CAT scan with approx  5mm calculus in the proximal left ureter  The urology service was already called in from the ER  Would hydrate him with IV fluids  Blood pressure on the higher side likely secondary to pain  Consults    Review of Systems    Historical Information   Past Medical History:   Diagnosis Date    Asthma      History reviewed  No pertinent surgical history  Social History   History   Alcohol Use No     History   Drug Use No     History   Smoking Status    Never Smoker   Smokeless Tobacco    Never Used     Family History: non-contributory    Meds/Allergies   all current active meds have been reviewed  Allergies   Allergen Reactions    Amoxapine And Related     Amoxicillin     Penicillins Hives       Objective   Vitals: Blood pressure 162/85, pulse 88, temperature 98 3 °F (36 8 °C), temperature source Oral, resp  rate 18, height 6' 3" (1 905 m), weight 135 kg (296 lb 15 4 oz), SpO2 93 %  No intake/output data recorded      Invasive Devices     Peripheral Intravenous Line            Peripheral IV 07/25/17 Left Antecubital less than 1 day                Physical Exam   Large healthy appearing white male in moderate distress  Heart-RRR  Lungs-CTA  Abdomen- moderately obese with diffuse tenderness left side , no specific mass or point tenderness, no positional tenderness  extrem- neg  CCE    Lab Results:   I have personally reviewed pertinent reports  CBC:   Lab Results   Component Value Date    WBC 14 70 (H) 07/26/2017    HGB 13 6 07/26/2017    HCT 42 1 07/26/2017    MCV 86 07/26/2017     07/26/2017    MCH 27 8 07/26/2017    MCHC 32 3 07/26/2017    RDW 12 8 07/26/2017    MPV 10 3 07/26/2017    NRBC 0 07/25/2017     CMP:   Lab Results   Component Value Date     07/26/2017     07/26/2017    CO2 28 07/26/2017    ANIONGAP 9 07/26/2017    BUN 18 07/26/2017    CREATININE 1 51 (H) 07/26/2017    GLUCOSE 130 07/26/2017    CALCIUM 8 9 07/26/2017    AST 45 07/25/2017    ALT 80 (H) 07/25/2017    ALKPHOS 60 07/25/2017    PROT 8 9 (H) 07/25/2017    ALBUMIN 4 2 07/25/2017    BILITOT 0 40 07/25/2017    EGFR 61 07/26/2017     Urinalysis:   Lab Results   Component Value Date    COLORU Yellow 07/25/2017    CLARITYU Clear 07/25/2017    SPECGRAV >=1 030 07/25/2017    PHUR 5 5 07/25/2017    LEUKOCYTESUR Negative 07/25/2017    NITRITE Negative 07/25/2017    PROTEINUA 30 (1+) (A) 07/25/2017    GLUCOSEU Negative 07/25/2017    KETONESU Negative 07/25/2017    BILIRUBINUR Negative 07/25/2017    BLOODU Small (A) 07/25/2017     Imaging Studies: I have personally reviewed pertinent reports  EKG, Pathology, and Other Studies: I have personally reviewed pertinent reports  VTE Prophylaxis: Heparin and Enoxaparin (Lovenox)    Code Status: Level 1 - Full Code  Advance Directive and Living Will:      Power of :    POLST:      Counseling / Coordination of Care  Total floor / unit time spent today 35 minutes  Greater than 50% of total time was spent with the patient and / or family counseling and / or coordination of care

## 2017-07-27 VITALS
HEART RATE: 79 BPM | HEIGHT: 75 IN | RESPIRATION RATE: 20 BRPM | WEIGHT: 296 LBS | TEMPERATURE: 98.6 F | SYSTOLIC BLOOD PRESSURE: 130 MMHG | BODY MASS INDEX: 36.8 KG/M2 | OXYGEN SATURATION: 96 % | DIASTOLIC BLOOD PRESSURE: 65 MMHG

## 2017-07-27 PROBLEM — N13.30 HYDRONEPHROSIS, LEFT: Status: RESOLVED | Noted: 2017-07-25 | Resolved: 2017-07-27

## 2017-07-27 PROBLEM — N20.0 NEPHROLITHIASIS: Status: RESOLVED | Noted: 2017-07-25 | Resolved: 2017-07-27

## 2017-07-27 PROBLEM — N17.9 AKI (ACUTE KIDNEY INJURY) (HCC): Status: RESOLVED | Noted: 2017-07-25 | Resolved: 2017-07-27

## 2017-07-27 PROBLEM — N20.1 URETERIC STONE: Status: RESOLVED | Noted: 2017-07-25 | Resolved: 2017-07-27

## 2017-07-27 PROBLEM — I10 ACCELERATED HYPERTENSION: Status: RESOLVED | Noted: 2017-07-25 | Resolved: 2017-07-27

## 2017-07-27 LAB
ANION GAP SERPL CALCULATED.3IONS-SCNC: 8 MMOL/L (ref 4–13)
BACTERIA UR CULT: NORMAL
BASOPHILS # BLD AUTO: 0.02 THOUSANDS/ΜL (ref 0–0.1)
BASOPHILS NFR BLD AUTO: 0 % (ref 0–1)
BUN SERPL-MCNC: 16 MG/DL (ref 5–25)
CALCIUM SERPL-MCNC: 8.3 MG/DL (ref 8.3–10.1)
CHLORIDE SERPL-SCNC: 105 MMOL/L (ref 100–108)
CO2 SERPL-SCNC: 26 MMOL/L (ref 21–32)
CREAT SERPL-MCNC: 1.08 MG/DL (ref 0.6–1.3)
EOSINOPHIL # BLD AUTO: 0.01 THOUSAND/ΜL (ref 0–0.61)
EOSINOPHIL NFR BLD AUTO: 0 % (ref 0–6)
ERYTHROCYTE [DISTWIDTH] IN BLOOD BY AUTOMATED COUNT: 12.8 % (ref 11.6–15.1)
GFR SERPL CREATININE-BSD FRML MDRD: 92 ML/MIN/1.73SQ M
GLUCOSE SERPL-MCNC: 120 MG/DL (ref 65–140)
HCT VFR BLD AUTO: 35.9 % (ref 36.5–49.3)
HGB BLD-MCNC: 11.6 G/DL (ref 12–17)
LYMPHOCYTES # BLD AUTO: 1.27 THOUSANDS/ΜL (ref 0.6–4.47)
LYMPHOCYTES NFR BLD AUTO: 11 % (ref 14–44)
MCH RBC QN AUTO: 28.2 PG (ref 26.8–34.3)
MCHC RBC AUTO-ENTMCNC: 32.3 G/DL (ref 31.4–37.4)
MCV RBC AUTO: 87 FL (ref 82–98)
MONOCYTES # BLD AUTO: 1.02 THOUSAND/ΜL (ref 0.17–1.22)
MONOCYTES NFR BLD AUTO: 9 % (ref 4–12)
NEUTROPHILS # BLD AUTO: 9.69 THOUSANDS/ΜL (ref 1.85–7.62)
NEUTS SEG NFR BLD AUTO: 80 % (ref 43–75)
NRBC BLD AUTO-RTO: 0 /100 WBCS
PLATELET # BLD AUTO: 277 THOUSANDS/UL (ref 149–390)
PMV BLD AUTO: 10.5 FL (ref 8.9–12.7)
POTASSIUM SERPL-SCNC: 4.4 MMOL/L (ref 3.5–5.3)
RBC # BLD AUTO: 4.11 MILLION/UL (ref 3.88–5.62)
SODIUM SERPL-SCNC: 139 MMOL/L (ref 136–145)
WBC # BLD AUTO: 12.06 THOUSAND/UL (ref 4.31–10.16)

## 2017-07-27 PROCEDURE — 85025 COMPLETE CBC W/AUTO DIFF WBC: CPT | Performed by: GENERAL PRACTICE

## 2017-07-27 PROCEDURE — 80048 BASIC METABOLIC PNL TOTAL CA: CPT | Performed by: GENERAL PRACTICE

## 2017-07-27 RX ORDER — OXYCODONE HYDROCHLORIDE AND ACETAMINOPHEN 5; 325 MG/1; MG/1
1 TABLET ORAL EVERY 4 HOURS PRN
Qty: 15 TABLET | Refills: 0 | Status: SHIPPED | OUTPATIENT
Start: 2017-07-27 | End: 2017-08-07 | Stop reason: HOSPADM

## 2017-07-27 RX ORDER — CIPROFLOXACIN 500 MG/1
500 TABLET, FILM COATED ORAL 2 TIMES DAILY
Qty: 14 TABLET | Refills: 0 | Status: SHIPPED | OUTPATIENT
Start: 2017-07-27 | End: 2017-08-03

## 2017-07-27 RX ADMIN — HYDROMORPHONE HYDROCHLORIDE 0.5 MG: 1 INJECTION, SOLUTION INTRAMUSCULAR; INTRAVENOUS; SUBCUTANEOUS at 02:13

## 2017-07-27 RX ADMIN — HEPARIN SODIUM 5000 UNITS: 5000 INJECTION, SOLUTION INTRAVENOUS; SUBCUTANEOUS at 14:58

## 2017-07-27 RX ADMIN — SODIUM CHLORIDE 150 ML/HR: 0.9 INJECTION, SOLUTION INTRAVENOUS at 09:11

## 2017-07-27 RX ADMIN — HYDROMORPHONE HYDROCHLORIDE 0.5 MG: 1 INJECTION, SOLUTION INTRAMUSCULAR; INTRAVENOUS; SUBCUTANEOUS at 06:14

## 2017-07-27 RX ADMIN — HEPARIN SODIUM 5000 UNITS: 5000 INJECTION, SOLUTION INTRAVENOUS; SUBCUTANEOUS at 06:14

## 2017-07-27 RX ADMIN — SODIUM CHLORIDE 150 ML/HR: 0.9 INJECTION, SOLUTION INTRAVENOUS at 02:21

## 2017-07-27 RX ADMIN — SENNOSIDES 8.6 MG: 8.6 TABLET, FILM COATED ORAL at 08:40

## 2017-07-27 RX ADMIN — OXYCODONE HYDROCHLORIDE AND ACETAMINOPHEN 1 TABLET: 5; 325 TABLET ORAL at 08:40

## 2017-07-27 RX ADMIN — CIPROFLOXACIN 500 MG: 500 TABLET, FILM COATED ORAL at 08:40

## 2017-07-27 RX ADMIN — OXYCODONE HYDROCHLORIDE AND ACETAMINOPHEN 1 TABLET: 5; 325 TABLET ORAL at 14:56

## 2017-07-27 NOTE — DISCHARGE SUMMARY
Discharge Summary - [facility] Internal Medicine    Patient Information: Delaney Schumacher 29 y o  male MRN: [id number]  Unit/Bed#: -01 Encounter: [id number]    Discharging Physician / Practitioner: Yanely Corley MD  PCP: No primary care provider on file  Admission Date: 7/25/2017  Discharge Date: 07/27/17    Reason for Admission: Flank pain, nephrolithiais    Discharge Diagnoses:     Principal Problem (Resolved):    Ureteric stone  Active Problems:    Asthma    Morbid obesity  Resolved Problems:    Hydronephrosis, left    Nephrolithiasis    ELINA (acute kidney injury)    Accelerated hypertension      Consultations During Hospital Stay:  · Urology      Procedures Performed:     · Ureteral stent placement    Significant Findings:     · Renal stone    Incidental Findings:   ·      Test Results Pending at Discharge (will require follow up):   ·      Outpatient Tests Requested:  · Cbc 3 days    Complications:  none    Hospital Course: tion declined    Delaney Schumacher is a 29 y o  male patient who originally presented to the hospital on 7/25/2017 due to f   Lank pain  He was found to have nephrolithiasis  He was initially treated with flomax but as his kidney function declined, a ureteral stent was placed  With this his kidney function improved  He was treated with cipro for leucocytosis which improved by discharge  He will remove his stent as directed by urology and follow up with urology as outpatient  Condition at Discharge: good     Discharge Day Visit / Exam:     * Please refer to separate progress for these details *    Discharge instructions/Information to patient and family:   See after visit summary for information provided to patient and family  Provisions for Follow-Up Care:  See after visit summary for information related to follow-up care and any pertinent home health orders        Disposition:     Home    For Discharges to [facility] SNF:   · Not Applicable to this Patient - Not Applicable to this Patient    Planned Readmission:     Discharge Statement:  I spent 40 minutes discharging the patient  This time was spent on the day of discharge  I had direct contact with the patient on the day of discharge  Greater than 50% of the total time was spent examining patient, answering all patient questions, arranging and discussing plan of care with patient as well as directly providing post-discharge instructions  Additional time then spent on discharge activities  Discharge Medications:  See after visit summary for reconciled discharge medications provided to patient and family  ** Please Note: Dragon 360 Dictation voice to text software may have been used in the creation of this document   **

## 2017-07-27 NOTE — SOCIAL WORK
Patient being discharged this day, cm spoke with patient and confirmed choice pharmacy of [facility]  Cm spoke with the pharmacist and informed that prescribed Cipro $7 21  Cm spoke with patient mother who confirmed she was able to manage the cost  Cm informed family that cm unable to call in patient oxycodone script, family stated she understood  No reported needs at this time, patient will continue to follow up with [employer] to finalize medical assistance application  Mother to transport patient home this day

## 2017-07-27 NOTE — PLAN OF CARE
Problem: PAIN - ADULT  Goal: Verbalizes/displays adequate comfort level or baseline comfort level  Interventions:  - Encourage patient to monitor pain and request assistance  - Assess pain using appropriate pain scale  - Administer analgesics based on type and severity of pain and evaluate response  - Implement non-pharmacological measures as appropriate and evaluate response  - Consider cultural and social influences on pain and pain management  - Notify physician/advanced practitioner if interventions unsuccessful or patient reports new pain   Outcome: Progressing      Problem: INFECTION - ADULT  Goal: Absence or prevention of progression during hospitalization  INTERVENTIONS:  - Assess and monitor for signs and symptoms of infection  - Monitor lab/diagnostic results  - Monitor all insertion sites, i e  indwelling lines, tubes, and drains  - Monitor endotracheal (as able) and nasal secretions for changes in amount and color  - [place] appropriate cooling/warming therapies per order  - Administer medications as ordered  - Instruct and encourage patient and family to use good hand hygiene technique  - Identify and instruct in appropriate isolation precautions for identified infection/condition   Outcome: Progressing    Goal: Absence of fever/infection during neutropenic period  INTERVENTIONS:  - Monitor WBC  - Implement neutropenic guidelines   Outcome: Completed Date Met: 07/27/17      Problem: SAFETY ADULT  Goal: Patient will remain free of falls  INTERVENTIONS:  - Assess patient frequently for physical needs  -  Identify cognitive and physical deficits and behaviors that affect risk of falls    -  [place] fall precautions as indicated by assessment   - Educate patient/family on patient safety including physical limitations  - Instruct patient to call for assistance with activity based on assessment  - Modify environment to reduce risk of injury  - Consider OT/PT consult to assist with strengthening/mobility Outcome: Completed Date Met: 07/27/17    Goal: Maintain or return to baseline ADL function  INTERVENTIONS:  -  Assess patient's ability to carry out ADLs; assess patient's baseline for ADL function and identify physical deficits which impact ability to perform ADLs (bathing, care of mouth/teeth, toileting, grooming, dressing, etc )  - Assess/evaluate cause of self-care deficits   - Assess range of motion  - Assess patient's mobility; develop plan if impaired  - Assess patient's need for assistive devices and provide as appropriate  - Encourage maximum independence but intervene and supervise when necessary  ¯ Involve family in performance of ADLs  ¯ Assess for home care needs following discharge   ¯ Request OT consult to assist with ADL evaluation and planning for discharge  ¯ Provide patient education as appropriate   Outcome: Completed Date Met: 07/27/17    Goal: Maintain or return mobility status to optimal level  INTERVENTIONS:  - Assess patient's baseline mobility status (ambulation, transfers, stairs, etc )    - Identify cognitive and physical deficits and behaviors that affect mobility  - Identify mobility aids required to assist with transfers and/or ambulation (gait belt, sit-to-stand, lift, walker, cane, etc )  - [place] fall precautions as indicated by assessment  - Record patient progress and toleration of activity level on Mobility SBAR; progress patient to next Phase/Stage  - Instruct patient to call for assistance with activity based on assessment  - Request Rehabilitation consult to assist with strengthening/weightbearing, etc    Outcome: Completed Date Met: 07/27/17      Problem: DISCHARGE PLANNING  Goal: Discharge to home or other facility with appropriate resources  INTERVENTIONS:  - Identify barriers to discharge w/patient and caregiver  - Arrange for needed discharge resources and transportation as appropriate  - Identify discharge learning needs (meds, wound care, etc )  - Arrange for interpretive services to assist at discharge as needed  - Refer to Case Management Department for coordinating discharge planning if the patient needs post-hospital services based on physician/advanced practitioner order or complex needs related to functional status, cognitive ability, or social support system   Outcome: Completed Date Met: 07/27/17      Problem: Knowledge Deficit  Goal: Patient/family/caregiver demonstrates understanding of disease process, treatment plan, medications, and discharge instructions  Complete learning assessment and assess knowledge base    Interventions:  - Provide teaching at level of understanding  - Provide teaching via preferred learning methods   Outcome: Completed Date Met: 07/27/17      Problem: DISCHARGE PLANNING - CARE MANAGEMENT  Goal: Discharge to post-acute care or home with appropriate resources  INTERVENTIONS:  - Conduct assessment to determine patient/family and health care team treatment goals, and need for post-acute services based on payer coverage, community resources, and patient preferences, and barriers to discharge  - Address psychosocial, clinical, and financial barriers to discharge as identified in assessment in conjunction with the patient/family and health care team  - Arrange appropriate level of post-acute services according to patient's   needs and preference and payer coverage in collaboration with the physician and health care team  - Communicate with and update the patient/family, physician, and health care team regarding progress on the discharge plan  - Arrange appropriate transportation to post-acute venues   Outcome: Completed Date Met: 07/27/17

## 2017-07-27 NOTE — PROGRESS NOTES
[facility] Internal Medicine Progress Note  Patient: Meryl Cotter 29 y o  male   MRN: [id number]  PCP: No primary care provider on file  Unit/Bed#: [facility]-01 Encounter: [id number]  Date Of Visit: 17    Assessment:    Principal Problem:    Ureteric stone  Active Problems:    Hydronephrosis, left    Nephrolithiasis    ELINA (acute kidney injury)    Asthma    Morbid obesity    Accelerated hypertension      Plan:      Plan for the Primary Problem(s): ? Left ureteric stone s/p removal;  on cipro; urology following and on flomax;   ? asthma-currently stable  ? Acute kidney injury  Cr normal  Accelerated hypertension-bp improved      VTE Pharmacologic Prophylaxis:   Pharmacologic: Heparin  Mechanical VTE Prophylaxis in Place: Yes    Patient Centered Rounds: I have performed bedside rounds with nursing staff today  Discussions with Specialists or Other Care Team Provider:     Education and Discussions with Family / Patient:     Time Spent for Care: 30 minutes  More than 50% of total time spent on counseling and coordination of care as described above  Current Length of Stay: 1 day(s)    Current Patient Status: Inpatient   Certification Statement: The patient will continue to require additional inpatient hospital stay due to possible disharge today    Discharge Plan: home    Code Status: Level 1 - Full Code      Subjective:   Less flank pain  Objective:     Vitals:   Temp (24hrs), Av 7 °F (37 1 °C), Min:97 9 °F (36 6 °C), Max:99 6 °F (37 6 °C)    HR:  [] 75  Resp:  [12-18] 18  BP: (112-150)/(61-91) 140/65  SpO2:  [93 %-100 %] 97 %  Body mass index is 37 kg/m²  Input and Output Summary (last 24 hours):        Intake/Output Summary (Last 24 hours) at 17 6568  Last data filed at 17 0500   Gross per 24 hour   Intake             1690 ml   Output              950 ml   Net              740 ml       Physical Exam:     Physical Exam   Constitutional: He appears well-developed and well-nourished  HENT:   Head: Normocephalic  Eyes: Conjunctivae are normal  Pupils are equal, round, and reactive to light  Cardiovascular: Normal rate and regular rhythm  Pulmonary/Chest: Effort normal and breath sounds normal    Abdominal: Soft  Bowel sounds are normal    Musculoskeletal: He exhibits no edema  Additional Data:     Labs:      Results from last 7 days  Lab Units 07/27/17  0511   WBC Thousand/uL 12 06*   HEMOGLOBIN g/dL 11 6*   HEMATOCRIT % 35 9*   PLATELETS Thousands/uL 277   NEUTROS PCT % 80*   LYMPHS PCT % 11*   MONOS PCT % 9   EOS PCT % 0       Results from last 7 days  Lab Units 07/27/17  0511  07/25/17  1309   SODIUM mmol/L 139  < > 140   POTASSIUM mmol/L 4 4  < > 4 3   CHLORIDE mmol/L 105  < > 101   CO2 mmol/L 26  < > 27   BUN mg/dL 16  < > 13   CREATININE mg/dL 1 08  < > 1 24   CALCIUM mg/dL 8 3  < > 9 6   TOTAL PROTEIN g/dL  --   --  8 9*   BILIRUBIN TOTAL mg/dL  --   --  0 40   ALK PHOS U/L  --   --  60   ALT U/L  --   --  80*   AST U/L  --   --  45   GLUCOSE RANDOM mg/dL 120  < > 155*   < > = values in this interval not displayed  * I Have Reviewed All Lab Data Listed Above  * Additional Pertinent Lab Tests Reviewed: All Labs Within Last 24 Hours Reviewed    Imaging:    Imaging Reports Reviewed Today Include:   Imaging Personally Reviewed by Myself Includes:      Recent Cultures (last 7 days):           Last 24 Hours Medication List:     ciprofloxacin 500 mg Oral BID   heparin (porcine) 5,000 Units Subcutaneous Q8H [address]   senna 1 tablet Oral BID   tamsulosin 0 4 mg Oral Daily With Dinner        Today, Patient Was Seen By: Jailene Harris MD    ** Please Note: Dragon 360 Dictation voice to text software may have been used in the creation of this document   **

## 2017-07-27 NOTE — PHYSICIAN ADVISOR
This patient is a 29 y o  y/o male who is admitted to the hospital for flank pain  The patient presented to the ED on 7/25/17 at 1233 and was admitted to the hospital on 7/25/2017 1238  Vital signs in the ER are as follows   ED Triage Vitals [07/25/17 1244]   Temperature Pulse Respirations Blood Pressure SpO2   97 9 °F (36 6 °C) 67 20 161/87 98 %      Temp Source Heart Rate Source Patient Position BP Location FiO2 (%)   Oral Monitor Lying Right arm --      Pain Score       Worst Possible Pain         The urinalysis was notable for blood  Other laboratory findings include creatinine 1 24 and WBC 11 89  Imaging revealed left-sided hydronephrosis and a 5 mm calculus within the proximal ureter  Blood pressure was initially elevated possibly secondary to pain, 177/100  The patient continued to have severe flank pain associated with nausea and vomiting  The patient was admitted to the hospital under observation status because of his symptoms and imaging findings  The care plan would include ongoing intravenous fluid resuscitation and urology management  He was felt to have a component of acute kidney injury  The patient was changed to inpatient status and underwent ureteroscopy and stent placement  Inpatient criteria are met for his diagnosis  The patient remains on intravenous fluid  He has required more than 10 doses of intravenous hydromorphone for uncontrolled pain as well as 3 doses of intravenous Toradol  Hospitalization has exceeded 2 midnights  INPATIENT level of care became the appropriate status for this patient with symptomatic urolithiasis causing hydronephrosis and severe flank pain

## 2017-07-31 ENCOUNTER — HOSPITAL ENCOUNTER (EMERGENCY)
Facility: HOSPITAL | Age: 32
Discharge: HOME | End: 2017-07-31
Attending: EMERGENCY MEDICINE
Payer: COMMERCIAL

## 2017-07-31 ENCOUNTER — APPOINTMENT (EMERGENCY)
Dept: ULTRASOUND IMAGING | Facility: HOSPITAL | Age: 32
End: 2017-07-31
Payer: COMMERCIAL

## 2017-07-31 VITALS
RESPIRATION RATE: 12 BRPM | WEIGHT: 290.13 LBS | BODY MASS INDEX: 36.07 KG/M2 | HEART RATE: 69 BPM | DIASTOLIC BLOOD PRESSURE: 68 MMHG | SYSTOLIC BLOOD PRESSURE: 118 MMHG | OXYGEN SATURATION: 98 % | TEMPERATURE: 98.1 F | HEIGHT: 75 IN

## 2017-07-31 DIAGNOSIS — N39.0 ACUTE URINARY TRACT INFECTION: ICD-10-CM

## 2017-07-31 DIAGNOSIS — Z87.442 H/O: URINARY STONE: ICD-10-CM

## 2017-07-31 DIAGNOSIS — R10.9 LEFT FLANK PAIN: Primary | ICD-10-CM

## 2017-07-31 LAB
ANION GAP SERPL CALCULATED.3IONS-SCNC: 8 MMOL/L (ref 4–13)
BACTERIA UR QL AUTO: ABNORMAL /HPF
BASOPHILS # BLD AUTO: 0.07 THOUSANDS/ΜL (ref 0–0.1)
BASOPHILS NFR BLD AUTO: 1 % (ref 0–1)
BILIRUB UR QL STRIP: NEGATIVE
BUN SERPL-MCNC: 10 MG/DL (ref 5–25)
CALCIUM SERPL-MCNC: 9.3 MG/DL (ref 8.3–10.1)
CHLORIDE SERPL-SCNC: 103 MMOL/L (ref 100–108)
CLARITY UR: ABNORMAL
CO2 SERPL-SCNC: 28 MMOL/L (ref 21–32)
COLOR UR: YELLOW
CREAT SERPL-MCNC: 1 MG/DL (ref 0.6–1.3)
EOSINOPHIL # BLD AUTO: 0.46 THOUSAND/ΜL (ref 0–0.61)
EOSINOPHIL NFR BLD AUTO: 5 % (ref 0–6)
ERYTHROCYTE [DISTWIDTH] IN BLOOD BY AUTOMATED COUNT: 12.2 % (ref 11.6–15.1)
GFR SERPL CREATININE-BSD FRML MDRD: 101 ML/MIN/1.73SQ M
GLUCOSE SERPL-MCNC: 103 MG/DL (ref 65–140)
GLUCOSE UR STRIP-MCNC: NEGATIVE MG/DL
HCT VFR BLD AUTO: 40.2 % (ref 36.5–49.3)
HGB BLD-MCNC: 13.4 G/DL (ref 12–17)
HGB UR QL STRIP.AUTO: ABNORMAL
KETONES UR STRIP-MCNC: NEGATIVE MG/DL
LEUKOCYTE ESTERASE UR QL STRIP: ABNORMAL
LYMPHOCYTES # BLD AUTO: 2.02 THOUSANDS/ΜL (ref 0.6–4.47)
LYMPHOCYTES NFR BLD AUTO: 21 % (ref 14–44)
MCH RBC QN AUTO: 27.9 PG (ref 26.8–34.3)
MCHC RBC AUTO-ENTMCNC: 33.3 G/DL (ref 31.4–37.4)
MCV RBC AUTO: 84 FL (ref 82–98)
MONOCYTES # BLD AUTO: 0.84 THOUSAND/ΜL (ref 0.17–1.22)
MONOCYTES NFR BLD AUTO: 9 % (ref 4–12)
NEUTROPHILS # BLD AUTO: 6.13 THOUSANDS/ΜL (ref 1.85–7.62)
NEUTS SEG NFR BLD AUTO: 64 % (ref 43–75)
NITRITE UR QL STRIP: NEGATIVE
NON-SQ EPI CELLS URNS QL MICRO: ABNORMAL /HPF
NRBC BLD AUTO-RTO: 0 /100 WBCS
PH UR STRIP.AUTO: 5 [PH] (ref 4.5–8)
PLATELET # BLD AUTO: 417 THOUSANDS/UL (ref 149–390)
PMV BLD AUTO: 9.2 FL (ref 8.9–12.7)
POTASSIUM SERPL-SCNC: 4.4 MMOL/L (ref 3.5–5.3)
PROT UR STRIP-MCNC: ABNORMAL MG/DL
RBC # BLD AUTO: 4.81 MILLION/UL (ref 3.88–5.62)
RBC #/AREA URNS AUTO: ABNORMAL /HPF
SODIUM SERPL-SCNC: 139 MMOL/L (ref 136–145)
SP GR UR STRIP.AUTO: >=1.03 (ref 1–1.03)
UROBILINOGEN UR QL STRIP.AUTO: 0.2 E.U./DL
WBC # BLD AUTO: 9.56 THOUSAND/UL (ref 4.31–10.16)
WBC #/AREA URNS AUTO: ABNORMAL /HPF

## 2017-07-31 PROCEDURE — 81001 URINALYSIS AUTO W/SCOPE: CPT | Performed by: EMERGENCY MEDICINE

## 2017-07-31 PROCEDURE — 36415 COLL VENOUS BLD VENIPUNCTURE: CPT | Performed by: EMERGENCY MEDICINE

## 2017-07-31 PROCEDURE — 76770 US EXAM ABDO BACK WALL COMP: CPT

## 2017-07-31 PROCEDURE — 96374 THER/PROPH/DIAG INJ IV PUSH: CPT

## 2017-07-31 PROCEDURE — 99284 EMERGENCY DEPT VISIT MOD MDM: CPT

## 2017-07-31 PROCEDURE — 96376 TX/PRO/DX INJ SAME DRUG ADON: CPT

## 2017-07-31 PROCEDURE — 80048 BASIC METABOLIC PNL TOTAL CA: CPT | Performed by: EMERGENCY MEDICINE

## 2017-07-31 PROCEDURE — 85025 COMPLETE CBC W/AUTO DIFF WBC: CPT | Performed by: EMERGENCY MEDICINE

## 2017-07-31 RX ORDER — MORPHINE SULFATE 15 MG/1
7.5 TABLET ORAL EVERY 6 HOURS PRN
Qty: 8 TABLET | Refills: 0 | Status: SHIPPED | OUTPATIENT
Start: 2017-07-31 | End: 2017-08-07 | Stop reason: HOSPADM

## 2017-07-31 RX ADMIN — HYDROMORPHONE HYDROCHLORIDE 1 MG: 1 INJECTION, SOLUTION INTRAMUSCULAR; INTRAVENOUS; SUBCUTANEOUS at 13:14

## 2017-07-31 RX ADMIN — HYDROMORPHONE HYDROCHLORIDE 1 MG: 1 INJECTION, SOLUTION INTRAMUSCULAR; INTRAVENOUS; SUBCUTANEOUS at 14:40

## 2017-08-01 ENCOUNTER — APPOINTMENT (EMERGENCY)
Dept: CT IMAGING | Facility: HOSPITAL | Age: 32
End: 2017-08-01
Payer: COMMERCIAL

## 2017-08-01 ENCOUNTER — HOSPITAL ENCOUNTER (EMERGENCY)
Facility: HOSPITAL | Age: 32
Discharge: HOME | End: 2017-08-01
Attending: EMERGENCY MEDICINE | Admitting: EMERGENCY MEDICINE
Payer: COMMERCIAL

## 2017-08-01 VITALS
OXYGEN SATURATION: 98 % | DIASTOLIC BLOOD PRESSURE: 74 MMHG | TEMPERATURE: 97.5 F | BODY MASS INDEX: 36.65 KG/M2 | SYSTOLIC BLOOD PRESSURE: 142 MMHG | HEART RATE: 100 BPM | RESPIRATION RATE: 12 BRPM | WEIGHT: 293.2 LBS

## 2017-08-01 DIAGNOSIS — N20.1 URETEROLITHIASIS: ICD-10-CM

## 2017-08-01 DIAGNOSIS — R10.9 FLANK PAIN, ACUTE: Primary | ICD-10-CM

## 2017-08-01 LAB
ALBUMIN SERPL BCP-MCNC: 3.5 G/DL (ref 3.5–5)
ALP SERPL-CCNC: 57 U/L (ref 46–116)
ALT SERPL W P-5'-P-CCNC: 79 U/L (ref 12–78)
ANION GAP SERPL CALCULATED.3IONS-SCNC: 7 MMOL/L (ref 4–13)
AST SERPL W P-5'-P-CCNC: 43 U/L (ref 5–45)
BASOPHILS # BLD MANUAL: 0 THOUSAND/UL (ref 0–0.1)
BASOPHILS NFR MAR MANUAL: 0 % (ref 0–1)
BILIRUB SERPL-MCNC: 0.4 MG/DL (ref 0.2–1)
BUN SERPL-MCNC: 11 MG/DL (ref 5–25)
CALCIUM SERPL-MCNC: 9.1 MG/DL (ref 8.3–10.1)
CHLORIDE SERPL-SCNC: 101 MMOL/L (ref 100–108)
CO2 SERPL-SCNC: 29 MMOL/L (ref 21–32)
COLOR, POC: NORMAL
CREAT SERPL-MCNC: 1.06 MG/DL (ref 0.6–1.3)
EOSINOPHIL # BLD MANUAL: 0.73 THOUSAND/UL (ref 0–0.4)
EOSINOPHIL NFR BLD MANUAL: 6 % (ref 0–6)
ERYTHROCYTE [DISTWIDTH] IN BLOOD BY AUTOMATED COUNT: 12.7 % (ref 11.6–15.1)
EXT BILIRUBIN, UA: NORMAL
EXT BLOOD URINE: NORMAL
EXT GLUCOSE, UA: NEGATIVE
EXT KETONES: NEGATIVE
EXT NITRITE, UA: NEGATIVE
EXT PH, UA: 6
EXT PROTEIN, UA: 100
EXT SPECIFIC GRAVITY, UA: 1.03
EXT UROBILINOGEN: NEGATIVE
GFR SERPL CREATININE-BSD FRML MDRD: 94 ML/MIN/1.73SQ M
GLUCOSE SERPL-MCNC: 105 MG/DL (ref 65–140)
HCT VFR BLD AUTO: 39.9 % (ref 36.5–49.3)
HGB BLD-MCNC: 13.2 G/DL (ref 12–17)
INR PPP: 0.91 (ref 0.86–1.16)
LACTATE SERPL-SCNC: 2 MMOL/L (ref 0.5–2)
LYMPHOCYTES # BLD AUTO: 1.46 THOUSAND/UL (ref 0.6–4.47)
LYMPHOCYTES # BLD AUTO: 12 % (ref 14–44)
MCH RBC QN AUTO: 27.7 PG (ref 26.8–34.3)
MCHC RBC AUTO-ENTMCNC: 33.1 G/DL (ref 31.4–37.4)
MCV RBC AUTO: 84 FL (ref 82–98)
MONOCYTES # BLD AUTO: 1.22 THOUSAND/UL (ref 0–1.22)
MONOCYTES NFR BLD: 10 % (ref 4–12)
NEUTROPHILS # BLD MANUAL: 8.15 THOUSAND/UL (ref 1.85–7.62)
NEUTS SEG NFR BLD AUTO: 67 % (ref 43–75)
PLATELET # BLD AUTO: 413 THOUSANDS/UL (ref 149–390)
PLATELET BLD QL SMEAR: ABNORMAL
PMV BLD AUTO: 9.4 FL (ref 8.9–12.7)
POTASSIUM SERPL-SCNC: 4.3 MMOL/L (ref 3.5–5.3)
PROT SERPL-MCNC: 7.9 G/DL (ref 6.4–8.2)
PROTHROMBIN TIME: 12.5 SECONDS (ref 12.1–14.4)
RBC # BLD AUTO: 4.76 MILLION/UL (ref 3.88–5.62)
SODIUM SERPL-SCNC: 137 MMOL/L (ref 136–145)
TOTAL CELLS COUNTED SPEC: 100
VARIANT LYMPHS # BLD AUTO: 5 %
WBC # BLD AUTO: 12.16 THOUSAND/UL (ref 4.31–10.16)
WBC # BLD EST: NORMAL 10*3/UL

## 2017-08-01 PROCEDURE — 99284 EMERGENCY DEPT VISIT MOD MDM: CPT

## 2017-08-01 PROCEDURE — 81002 URINALYSIS NONAUTO W/O SCOPE: CPT | Performed by: PHYSICIAN ASSISTANT

## 2017-08-01 PROCEDURE — 85027 COMPLETE CBC AUTOMATED: CPT | Performed by: PHYSICIAN ASSISTANT

## 2017-08-01 PROCEDURE — 96375 TX/PRO/DX INJ NEW DRUG ADDON: CPT

## 2017-08-01 PROCEDURE — 83605 ASSAY OF LACTIC ACID: CPT | Performed by: PHYSICIAN ASSISTANT

## 2017-08-01 PROCEDURE — 85610 PROTHROMBIN TIME: CPT | Performed by: PHYSICIAN ASSISTANT

## 2017-08-01 PROCEDURE — 87040 BLOOD CULTURE FOR BACTERIA: CPT | Performed by: PHYSICIAN ASSISTANT

## 2017-08-01 PROCEDURE — 36415 COLL VENOUS BLD VENIPUNCTURE: CPT | Performed by: PHYSICIAN ASSISTANT

## 2017-08-01 PROCEDURE — 96361 HYDRATE IV INFUSION ADD-ON: CPT

## 2017-08-01 PROCEDURE — 74177 CT ABD & PELVIS W/CONTRAST: CPT

## 2017-08-01 PROCEDURE — 96374 THER/PROPH/DIAG INJ IV PUSH: CPT

## 2017-08-01 PROCEDURE — 85007 BL SMEAR W/DIFF WBC COUNT: CPT | Performed by: PHYSICIAN ASSISTANT

## 2017-08-01 PROCEDURE — 96376 TX/PRO/DX INJ SAME DRUG ADON: CPT

## 2017-08-01 PROCEDURE — 80053 COMPREHEN METABOLIC PANEL: CPT | Performed by: PHYSICIAN ASSISTANT

## 2017-08-01 RX ORDER — ONDANSETRON 2 MG/ML
4 INJECTION INTRAMUSCULAR; INTRAVENOUS ONCE
Status: COMPLETED | OUTPATIENT
Start: 2017-08-01 | End: 2017-08-01

## 2017-08-01 RX ORDER — KETOROLAC TROMETHAMINE 30 MG/ML
15 INJECTION, SOLUTION INTRAMUSCULAR; INTRAVENOUS ONCE
Status: COMPLETED | OUTPATIENT
Start: 2017-08-01 | End: 2017-08-01

## 2017-08-01 RX ORDER — ONDANSETRON 4 MG/1
4 TABLET, FILM COATED ORAL EVERY 8 HOURS PRN
Qty: 10 TABLET | Refills: 0 | Status: SHIPPED | OUTPATIENT
Start: 2017-08-01 | End: 2017-08-07 | Stop reason: HOSPADM

## 2017-08-01 RX ADMIN — SODIUM CHLORIDE 1000 ML: 0.9 INJECTION, SOLUTION INTRAVENOUS at 12:26

## 2017-08-01 RX ADMIN — HYDROMORPHONE HYDROCHLORIDE 1 MG: 1 INJECTION, SOLUTION INTRAMUSCULAR; INTRAVENOUS; SUBCUTANEOUS at 15:58

## 2017-08-01 RX ADMIN — HYDROMORPHONE HYDROCHLORIDE 1 MG: 1 INJECTION, SOLUTION INTRAMUSCULAR; INTRAVENOUS; SUBCUTANEOUS at 12:35

## 2017-08-01 RX ADMIN — IOHEXOL 100 ML: 350 INJECTION, SOLUTION INTRAVENOUS at 13:41

## 2017-08-01 RX ADMIN — KETOROLAC TROMETHAMINE 15 MG: 30 INJECTION, SOLUTION INTRAMUSCULAR at 15:57

## 2017-08-01 RX ADMIN — ONDANSETRON 4 MG: 2 INJECTION INTRAMUSCULAR; INTRAVENOUS at 12:32

## 2017-08-04 ENCOUNTER — ALLSCRIPTS OFFICE VISIT (OUTPATIENT)
Dept: OTHER | Facility: OTHER | Age: 32
End: 2017-08-04

## 2017-08-04 ENCOUNTER — TRANSCRIBE ORDERS (OUTPATIENT)
Dept: ADMINISTRATIVE | Facility: HOSPITAL | Age: 32
End: 2017-08-04

## 2017-08-04 DIAGNOSIS — N20.0 KIDNEY STONE: Primary | ICD-10-CM

## 2017-08-05 ENCOUNTER — APPOINTMENT (INPATIENT)
Dept: RADIOLOGY | Facility: HOSPITAL | Age: 32
DRG: 710 | End: 2017-08-05
Attending: INTERNAL MEDICINE
Payer: COMMERCIAL

## 2017-08-05 ENCOUNTER — HOSPITAL ENCOUNTER (INPATIENT)
Facility: HOSPITAL | Age: 32
LOS: 2 days | Discharge: HOME | DRG: 710 | End: 2017-08-07
Attending: INTERNAL MEDICINE | Admitting: INTERNAL MEDICINE
Payer: COMMERCIAL

## 2017-08-05 DIAGNOSIS — N20.0 CALCULUS OF KIDNEY: ICD-10-CM

## 2017-08-05 DIAGNOSIS — N15.1 RENAL AND PERINEPHRIC ABSCESS: ICD-10-CM

## 2017-08-05 DIAGNOSIS — Z00.00 ENCOUNTER FOR GENERAL ADULT MEDICAL EXAMINATION WITHOUT ABNORMAL FINDINGS: ICD-10-CM

## 2017-08-05 DIAGNOSIS — N15.1 PERINEPHRIC ABSCESS: Primary | ICD-10-CM

## 2017-08-05 PROBLEM — J45.909 ASTHMA: Chronic | Status: ACTIVE | Noted: 2017-07-25

## 2017-08-05 PROBLEM — N39.0 SEPSIS SECONDARY TO UTI (HCC): Status: ACTIVE | Noted: 2017-08-05

## 2017-08-05 PROBLEM — A41.9 SEPSIS SECONDARY TO UTI (HCC): Status: ACTIVE | Noted: 2017-08-05

## 2017-08-05 PROBLEM — E66.01 MORBID OBESITY (HCC): Chronic | Status: ACTIVE | Noted: 2017-07-25

## 2017-08-05 LAB
ANION GAP SERPL CALCULATED.3IONS-SCNC: 6 MMOL/L (ref 4–13)
APTT PPP: 36 SECONDS (ref 23–35)
BACTERIA UR QL AUTO: ABNORMAL /HPF
BILIRUB UR QL STRIP: NEGATIVE
BUN SERPL-MCNC: 7 MG/DL (ref 5–25)
CALCIUM SERPL-MCNC: 8.3 MG/DL (ref 8.3–10.1)
CHLORIDE SERPL-SCNC: 104 MMOL/L (ref 100–108)
CLARITY UR: CLEAR
CO2 SERPL-SCNC: 27 MMOL/L (ref 21–32)
COLOR UR: YELLOW
CREAT SERPL-MCNC: 0.77 MG/DL (ref 0.6–1.3)
ERYTHROCYTE [DISTWIDTH] IN BLOOD BY AUTOMATED COUNT: 12.7 % (ref 11.6–15.1)
GFR SERPL CREATININE-BSD FRML MDRD: 122 ML/MIN/1.73SQ M
GLUCOSE SERPL-MCNC: 100 MG/DL (ref 65–140)
GLUCOSE UR STRIP-MCNC: NEGATIVE MG/DL
HCT VFR BLD AUTO: 34.3 % (ref 36.5–49.3)
HGB BLD-MCNC: 11.7 G/DL (ref 12–17)
HGB UR QL STRIP.AUTO: ABNORMAL
HYALINE CASTS #/AREA URNS LPF: ABNORMAL /LPF
INR PPP: 1.18 (ref 0.86–1.16)
KETONES UR STRIP-MCNC: NEGATIVE MG/DL
LACTATE SERPL-SCNC: 0.7 MMOL/L (ref 0.5–2)
LEUKOCYTE ESTERASE UR QL STRIP: NEGATIVE
MCH RBC QN AUTO: 28.5 PG (ref 26.8–34.3)
MCHC RBC AUTO-ENTMCNC: 34.1 G/DL (ref 31.4–37.4)
MCV RBC AUTO: 84 FL (ref 82–98)
NITRITE UR QL STRIP: NEGATIVE
NON-SQ EPI CELLS URNS QL MICRO: ABNORMAL /HPF
PH UR STRIP.AUTO: 7 [PH] (ref 4.5–8)
PLATELET # BLD AUTO: 311 THOUSANDS/UL (ref 149–390)
PMV BLD AUTO: 9.2 FL (ref 8.9–12.7)
POTASSIUM SERPL-SCNC: 4.1 MMOL/L (ref 3.5–5.3)
PROT UR STRIP-MCNC: NEGATIVE MG/DL
PROTHROMBIN TIME: 15.1 SECONDS (ref 12.1–14.4)
RBC # BLD AUTO: 4.11 MILLION/UL (ref 3.88–5.62)
RBC #/AREA URNS AUTO: ABNORMAL /HPF
SODIUM SERPL-SCNC: 137 MMOL/L (ref 136–145)
SP GR UR STRIP.AUTO: 1.03 (ref 1–1.03)
UROBILINOGEN UR QL STRIP.AUTO: 0.2 E.U./DL
WBC # BLD AUTO: 14.55 THOUSAND/UL (ref 4.31–10.16)
WBC #/AREA URNS AUTO: ABNORMAL /HPF

## 2017-08-05 PROCEDURE — 87070 CULTURE OTHR SPECIMN AEROBIC: CPT | Performed by: INTERNAL MEDICINE

## 2017-08-05 PROCEDURE — 99153 MOD SED SAME PHYS/QHP EA: CPT

## 2017-08-05 PROCEDURE — 0T9130Z DRAINAGE OF LEFT KIDNEY WITH DRAINAGE DEVICE, PERCUTANEOUS APPROACH: ICD-10-PCS | Performed by: RADIOLOGY

## 2017-08-05 PROCEDURE — 49405 IMAGE CATH FLUID COLXN VISC: CPT

## 2017-08-05 PROCEDURE — 83605 ASSAY OF LACTIC ACID: CPT | Performed by: INTERNAL MEDICINE

## 2017-08-05 PROCEDURE — C1729 CATH, DRAINAGE: HCPCS

## 2017-08-05 PROCEDURE — 81001 URINALYSIS AUTO W/SCOPE: CPT | Performed by: INTERNAL MEDICINE

## 2017-08-05 PROCEDURE — 99152 MOD SED SAME PHYS/QHP 5/>YRS: CPT

## 2017-08-05 PROCEDURE — 87205 SMEAR GRAM STAIN: CPT | Performed by: INTERNAL MEDICINE

## 2017-08-05 PROCEDURE — 85730 THROMBOPLASTIN TIME PARTIAL: CPT | Performed by: UROLOGY

## 2017-08-05 PROCEDURE — 85610 PROTHROMBIN TIME: CPT | Performed by: UROLOGY

## 2017-08-05 PROCEDURE — 87040 BLOOD CULTURE FOR BACTERIA: CPT | Performed by: INTERNAL MEDICINE

## 2017-08-05 PROCEDURE — 85027 COMPLETE CBC AUTOMATED: CPT | Performed by: UROLOGY

## 2017-08-05 PROCEDURE — 80048 BASIC METABOLIC PNL TOTAL CA: CPT | Performed by: UROLOGY

## 2017-08-05 RX ORDER — FENTANYL CITRATE 50 UG/ML
INJECTION, SOLUTION INTRAMUSCULAR; INTRAVENOUS CODE/TRAUMA/SEDATION MEDICATION
Status: COMPLETED | OUTPATIENT
Start: 2017-08-05 | End: 2017-08-05

## 2017-08-05 RX ORDER — SODIUM CHLORIDE 9 MG/ML
125 INJECTION, SOLUTION INTRAVENOUS CONTINUOUS
Status: DISCONTINUED | OUTPATIENT
Start: 2017-08-05 | End: 2017-08-07 | Stop reason: HOSPADM

## 2017-08-05 RX ORDER — MAGNESIUM HYDROXIDE/ALUMINUM HYDROXICE/SIMETHICONE 120; 1200; 1200 MG/30ML; MG/30ML; MG/30ML
30 SUSPENSION ORAL EVERY 6 HOURS PRN
Status: DISCONTINUED | OUTPATIENT
Start: 2017-08-05 | End: 2017-08-07 | Stop reason: HOSPADM

## 2017-08-05 RX ORDER — SENNOSIDES 8.6 MG
1 TABLET ORAL DAILY
Status: DISCONTINUED | OUTPATIENT
Start: 2017-08-05 | End: 2017-08-07 | Stop reason: HOSPADM

## 2017-08-05 RX ORDER — DOCUSATE SODIUM 100 MG/1
100 CAPSULE, LIQUID FILLED ORAL 2 TIMES DAILY
Status: DISCONTINUED | OUTPATIENT
Start: 2017-08-05 | End: 2017-08-07 | Stop reason: HOSPADM

## 2017-08-05 RX ORDER — MIDAZOLAM HYDROCHLORIDE 1 MG/ML
INJECTION INTRAMUSCULAR; INTRAVENOUS CODE/TRAUMA/SEDATION MEDICATION
Status: COMPLETED | OUTPATIENT
Start: 2017-08-05 | End: 2017-08-05

## 2017-08-05 RX ORDER — HYDROMORPHONE HCL 110MG/55ML
2 PATIENT CONTROLLED ANALGESIA SYRINGE INTRAVENOUS EVERY 4 HOURS PRN
Status: DISCONTINUED | OUTPATIENT
Start: 2017-08-05 | End: 2017-08-06

## 2017-08-05 RX ORDER — ALBUTEROL SULFATE 90 UG/1
2 AEROSOL, METERED RESPIRATORY (INHALATION) EVERY 6 HOURS PRN
Status: DISCONTINUED | OUTPATIENT
Start: 2017-08-05 | End: 2017-08-07 | Stop reason: HOSPADM

## 2017-08-05 RX ORDER — LEVOFLOXACIN 5 MG/ML
750 INJECTION, SOLUTION INTRAVENOUS EVERY 24 HOURS
Status: DISCONTINUED | OUTPATIENT
Start: 2017-08-05 | End: 2017-08-07 | Stop reason: HOSPADM

## 2017-08-05 RX ORDER — ONDANSETRON 2 MG/ML
4 INJECTION INTRAMUSCULAR; INTRAVENOUS EVERY 6 HOURS PRN
Status: DISCONTINUED | OUTPATIENT
Start: 2017-08-05 | End: 2017-08-07 | Stop reason: HOSPADM

## 2017-08-05 RX ADMIN — MIDAZOLAM 1 MG: 1 INJECTION INTRAMUSCULAR; INTRAVENOUS at 13:11

## 2017-08-05 RX ADMIN — HYDROMORPHONE HYDROCHLORIDE 2 MG: 2 INJECTION, SOLUTION INTRAMUSCULAR; INTRAVENOUS; SUBCUTANEOUS at 10:46

## 2017-08-05 RX ADMIN — SENNOSIDES 8.6 MG: 8.6 TABLET, FILM COATED ORAL at 10:36

## 2017-08-05 RX ADMIN — DOCUSATE SODIUM 100 MG: 100 CAPSULE, LIQUID FILLED ORAL at 17:31

## 2017-08-05 RX ADMIN — HYDROMORPHONE HYDROCHLORIDE 2 MG: 2 INJECTION, SOLUTION INTRAMUSCULAR; INTRAVENOUS; SUBCUTANEOUS at 15:45

## 2017-08-05 RX ADMIN — MIDAZOLAM 1 MG: 1 INJECTION INTRAMUSCULAR; INTRAVENOUS at 13:09

## 2017-08-05 RX ADMIN — FENTANYL CITRATE 50 MCG: 50 INJECTION INTRAMUSCULAR; INTRAVENOUS at 13:09

## 2017-08-05 RX ADMIN — LEVOFLOXACIN 750 MG: 5 INJECTION, SOLUTION INTRAVENOUS at 15:31

## 2017-08-05 RX ADMIN — SODIUM CHLORIDE 125 ML/HR: 0.9 INJECTION, SOLUTION INTRAVENOUS at 10:52

## 2017-08-05 RX ADMIN — HYDROMORPHONE HYDROCHLORIDE 2 MG: 2 INJECTION, SOLUTION INTRAMUSCULAR; INTRAVENOUS; SUBCUTANEOUS at 20:31

## 2017-08-05 RX ADMIN — DOCUSATE SODIUM 100 MG: 100 CAPSULE, LIQUID FILLED ORAL at 10:36

## 2017-08-05 RX ADMIN — FENTANYL CITRATE 25 MCG: 50 INJECTION INTRAMUSCULAR; INTRAVENOUS at 13:14

## 2017-08-05 NOTE — PROGRESS NOTES
H and P from admission reviewed  There have been no interval changes  Prior imaging was reviewed  Has small left perinephric fluid collection with clinical signs of infection  Recent lithotripsy  Plan for aspiration possible drain tube placement  Informed written consent was obtained  Questions answered      Yas Jacobs MD

## 2017-08-05 NOTE — PLAN OF CARE
Problem: DISCHARGE PLANNING - CARE MANAGEMENT  Goal: Discharge to post-acute care or home with appropriate resources  INTERVENTIONS:  - Conduct assessment to determine patient/family and health care team treatment goals, and need for post-acute services based on payer coverage, community resources, and patient preferences, and barriers to discharge  - Address psychosocial, clinical, and financial barriers to discharge as identified in assessment in conjunction with the patient/family and health care team  - Arrange appropriate level of post-acute services according to patient's   needs and preference and payer coverage in collaboration with the physician and health care team  - Communicate with and update the patient/family, physician, and health care team regarding progress on the discharge plan  - Arrange appropriate transportation to post-acute venues  When medically clear will discharge home with family     Outcome: Progressing

## 2017-08-05 NOTE — BRIEF OP NOTE (RAD/CATH)
Procedure Note    PATIENT NAME: Robi Bey  : 1985  MRN: [id number]     Pre-op Diagnosis: Left perinephric fluid collection  Post-op Diagnosis: Same    Surgeon:   Evelina Robles MD    Estimated Blood Loss: None  Findings: Small amount of bloody fluid aspirated from small left perinephric fluid collection  8 Frisian drain placed with ultrasound guidance      Specimens: Culture    Complications:  None    Anesthesia: Conscious sedation    Evelina Robles MD     Date: 2017  Time: 1:32 PM

## 2017-08-05 NOTE — SOCIAL WORK
Cm met with patient at bedside, patient accompanied by his mother girlfriend   Patient alert and oriented during interview and reports being independent with ADL's , no dme, vna or str  Patient reports filling his prescriptions at [facility] [address]   ([facility] already completed last admission   Pt has a new IR drain and pt does not want VNA if not covered   Pt mother and girlfriend educated and feel comfortable managing drain   Cm spoke with nurse and aware to reinforce drain eduction prior to discharge  When medically clear family will transport home  CM reviewed d/c planning process including the following: identifying help at home, patient preference for d/c planning needs, Discharge Lounge, [facility] Meds to Bed program, availability of treatment team to discuss questions or concerns patient and/or family may have regarding understanding medications and recognizing signs and symptoms once discharged  CM also encouraged patient to follow up with all recommended appointments after discharge  Patient advised of importance for patient and family to participate in managing patients medical well being

## 2017-08-05 NOTE — SEPSIS NOTE
Sepsis Note   Kavya Meehan 29 y o  male MRN: [id number]  Unit/Bed#: [facility] 911-01 Encounter: [id number]            Initial Sepsis Screening       08/05/17 1026                Is the patient's history suggestive of a new or worsening infection? (!)  Yes (Proceed)  -SS        Suspected source of infection urinary tract infection  -SS        Are two or more of the following signs & symptoms of infection both present and new to the patient? (!)  Yes (Proceed)  -SS        Indicate SIRS criteria Hyperthemia > 38 3C (100 9F); Leukocytosis (WBC > 81198 IJL)  -SS        If the answer is yes to both questions, suspicion of sepsis is present         If severe sepsis is present AND tissue hypoperfusion perists in the hour after fluid resuscitation or lactate > 4, the patient meets criteria for SEPTIC SHOCK         Are any of the following organ dysfunction criteria present within 6 hours of suspected infection and SIRS criteria that are NOT considered to be chronic conditions? (!)  Yes  -SS        Organ dysfunction --   none  -SS        Date of presentation of severe sepsis         Time of presentation of severe sepsis         Tissue hypoperfusion persists in the hour after crystalloid fluid administration, evidenced, by either:         Was hypotension present within one hour of the conclusion of crystalloid fluid administration?  No  -SS        Date of presentation of septic shock         Time of presentation of septic shock           User Key  (r) = Recorded By, (t) = Taken By, (c) = Cosigned By    [address] Name Provider Marvin Whitehead MD Physician

## 2017-08-05 NOTE — PROGRESS NOTES
UROLOGY CONSULTATION NOTE     Patient Identifiers: Angie Mobley (MRN [id number])  Service Requesting Consultation: [facility]  Service Providing Consultation:  Urology, Marcelle Wilson MD    Date of Service: 8/5/2017  Consults  Reason for Consultation: Sepsis, Perinephric abscess    History of Present Illness:     Angie Mobley is a 29 y o  old male who presents with severe left flank pain and fever  Had recent ureteroscopy and stone extraction with Dr Hannah Amaral  Patient developed severe pain following stent extraction earlier this week  Progressed to fever >101 last night  Seen at [facility], CT suggested perinephric abscess  Transferred for management  No chest pain  No shortness of breath  He has history of asthma and he has been taking some over-the-counter medication  He used to use albuterol in the past and has not required it for long time  No dizziness and has no headache        Past Medical, Past Surgical History:     Past Medical History:   Diagnosis Date    Asthma     Kidney stones    :    Past Surgical History:   Procedure Laterality Date    DENTAL SURGERY      SD CYSTO/URETERO W/LITHOTRIPSY &INDWELL STENT INSRT Left 7/26/2017    Procedure: CYSTOSCOPY URETEROSCOPY WITH LITHOTRIPSY HOLMIUM LASER, RETROGRADE PYELOGRAM AND INSERTION STENT URETERAL;  Surgeon: Maged Morales MD;  Location: [facility] OR;  Service: Urology   :    Medications, Allergies:     No current facility-administered medications for this encounter  Allergies: Allergies   Allergen Reactions    Amoxicillin Hives    Penicillins Hives   :    Social and Family History:   Social History:   Social History   Substance Use Topics    Smoking status: Never Smoker    Smokeless tobacco: Never Used    Alcohol use Yes      Comment: OCCASIONALLY         History   Smoking Status    Never Smoker   Smokeless Tobacco    Never Used       Family History:  Family History   Problem Relation Age of Onset    Family history unknown: Yes   : Review of Systems:     General: Fever, chills, or night sweats: positive  Cardiac: Negative for chest pain  Pulmonary: Negative for shortness of breath  Gastrointestinal: Abdominal pain positive  Nausea, vomiting, or diarrhea negative,  Genitourinary: See HPI above  Patient does not have hematuria  All other systems queried were negative  Physical Exam:   No intake/output data recorded  Temp (24hrs), Av 2 °F (37 3 °C), Min:99 2 °F (37 3 °C), Max:99 2 °F (37 3 °C)  current: Temperature: 99 2 °F (37 3 °C)  General: Patient is pleasant and in NAD  Awake and alert  /70   Pulse 83   Temp 99 2 °F (37 3 °C) (Oral)   Resp 18   SpO2 98%   /70   Pulse 83   Temp 99 2 °F (37 3 °C) (Oral)   Resp 18   SpO2 98%   General appearance: alert, appears stated age, cooperative and mild distress  Head: Normocephalic, without obvious abnormality, atraumatic  Lungs: clear to auscultation bilaterally  Heart: regular rate and rhythm, S1, S2 normal, no murmur, click, rub or gallop  Abdomen: soft, non-tender; bowel sounds normal; no masses,  no organomegaly  Extremities: extremities normal, atraumatic, no cyanosis or edema  Neurologic: Grossly normal  CROWE: none        Labs:     Lab Results   Component Value Date    HGB 13 2 2017    HCT 39 9 2017    WBC 12 16 (H) 2017     (H) 2017   ]    Lab Results   Component Value Date     2017    K 4 3 2017     2017    CO2 29 2017    BUN 11 2017    CREATININE 1 06 2017    CALCIUM 9 1 2017    GLUCOSE 105 2017   ]    Imaging:   I personally reviewed the images and report of the following studies, and reviewed them with the patient:  IR consult    (Results Pending)   Images reviewed with IR team    ASSESSMENT:     29 y o  old male with  fever and possible perinephric abscess  PLAN:     -Films reviewed with IR    Agree that patient may benefit from Perc drainage of left perinephric collection  No residual calculi or hydronephrosis seen  Consult placed for procedure later today   -Keep NPO  -Broad Spectrum antibiotics  -Obtain cultures    Thank you for allowing me to participate in this patients care  Please do not hesitate to call with any additional questions    Charbel Loaiza MD

## 2017-08-05 NOTE — H&P
[address]    History and Physical - Internal Medicine SOD B   Marty Briscoe 29 y o  male MRN: [id number]  [facility] Encounter: [id number]    Code Status: Level 1 - Full Code  POA:      Reason for Admission / Principal Problem: Sepsis 2/2 Perinephric abscess    HPI: Marty Briscoe is a 29 y o  male with past medical history of asthma and nephrolithiasis who presents as a transfer from [facility] at the request of Dr Hannah Amaral for left sided perinephric abscess and sepsis  The patient had lithotripsy with stent placement in the on 7/26/17  The stent was then pulled on 7/28/17 and the patient reports that he had been having significant left-sided flank pain since then  He was previously taking ciprofloxacin for UTI, and Percocet for pain  He was seen in the ED and campus on 8/1/17 for left-sided flank pain hydronephrosis and hydroureter  He was discharged from the ED to home  to complete a seven-day course of ciprofloxacin and was advised to follow-up with urology as an outpatient  He was seen as an outpatient yesterday by urology and was cleared for return to work however overnight started spiking temperatures and had continued significant left-sided flank pain so he went to [facility] ED for further evaluation  He was admitted to [facility] and was transferred to [facility] for continuity of care with Dr Hannah Amaral in his urology team  He complains of significant left-sided pain today reports that the morphine does not help, has some intermittent nausea with no vomiting and also complains of constipation  History obtained from patient and mother at bedside      PMH:  Past Medical History:   Diagnosis Date    Asthma     Hypertension     Nephrolithiasis       Past Surgical History:   Procedure Laterality Date    DENTAL SURGERY      OR CYSTO/URETERO W/LITHOTRIPSY &INDWELL STENT INSRT Left 7/26/2017    Procedure: CYSTOSCOPY URETEROSCOPY WITH LITHOTRIPSY HOLMIUM LASER, RETROGRADE PYELOGRAM AND INSERTION STENT URETERAL;  Surgeon: Sharon Ponce MD;  Location: [facility];  Service: Urology     Family History:   Family History   Problem Relation Age of Onset    Diabetes Father     Hypertension Father     Diabetes Maternal Grandfather      Social History:   History   Smoking Status    Never Smoker   Smokeless Tobacco    Never Used      History   Alcohol Use    Yes     Comment: OCCASIONALLY      History   Drug Use No      ROS:      GENERAL: distressed regaurding situation overall  HEENT: Denies double or blurry vision, no headache or lightheadedness   RESPIRATORY: chest tightness with no cough or dyspnea  CARDIOVASCULAR: denies chest pain or palpitations  GI: no abdominal pain, N/V, no constipation  : left sided flank pain with urination  NEURO: no numbness or tingling  PSYCH: denies SI/HI  SKIN: denies rashes or new lesions    Allergies: Allergies   Allergen Reactions    Amoxicillin Hives    Penicillins Hives     Home Medications:   HAWTHORN BERRIES PO Takes 60 mL by mouth daily     morphine (MSIR) 15 mg tablet Take 0 5 tablets by mouth every 6 (six) hours as needed for severe pain for up to 10 days Max Daily Amount: 30 mg     multivitamin (THERAGRAN) TABS Take 1 tablet by mouth daily     ondansetron (ZOFRAN) 4 mg tablet Take 1 tablet by mouth every 8 (eight) hours as needed for nausea or vomiting for up to 10 doses     oxyCODONE-acetaminophen (PERCOCET) 5-325 mg per tablet Take 1 tablet by mouth every 4 (four) hours as needed for moderate pain for up to 10 days Max Daily Amount: 6 tablets   MEDICATIONS CONFIRMED PERSONALLY WITH PATIENT AT BEDSIDE     Invasive lines and devices:   Invasive Devices          No matching active lines, drains, or airways        Vitals:   Vitals:    08/05/17 0700   BP: 131/70   Pulse: 83   Resp: 18   Temp: 99 2 °F (37 3 °C)   TempSrc: Oral   SpO2: 98%   Weight: 132 kg (290 lb)   Height: 6' 3" (1 905 m)     Temp  Min: 99 2 °F (37 3 °C)  Max: 99 2 °F (37 3 °C)  IBW: 84 5 kg  Body mass index is 36 25 kg/m²  Physical Exam:    Constitutional: Appears well-developed and well-nourished, NAD resting comfortably in bed     HEENT: Normocephalic and atraumatic, MMM   Eyes: PEARRL, No scleral icterus  Neck: Neck supple, trachea midline, no JVD  Cardiovascular: RRR, no murmur, rub or gallop  Respiratory: CTA B/L, no rales, rhonci or wheezes  Abdominal: (+)BS, soft, not distended, LLQ tenderness, L CVA tenderness  Musculoskeletal: 2+ radial equal B/L, DP 2+ and equal B/L   Neurological: Patient is alert and oriented to person, place, and time  No focal deficits  Skin: Skin is warm and dry  No obvious lesions  Psychiatric: Appropriate mood and affect       Labs:     Results from last 7 days  Lab Units 08/01/17  1216 07/31/17  1246   WBC Thousand/uL 12 16* 9 56   HEMOGLOBIN g/dL 13 2 13 4   HEMATOCRIT % 39 9 40 2   PLATELETS Thousands/uL 413* 417*   NEUTROS PCT %  --  64   MONOS PCT %  --  9   MONO PCT MAN % 10  --        Results from last 7 days  Lab Units 08/01/17  1216 07/31/17  1246   SODIUM mmol/L 137 139   POTASSIUM mmol/L 4 3 4 4   CHLORIDE mmol/L 101 103   CO2 mmol/L 29 28   BUN mg/dL 11 10   CREATININE mg/dL 1 06 1 00   CALCIUM mg/dL 9 1 9 3   TOTAL PROTEIN g/dL 7 9  --    BILIRUBIN TOTAL mg/dL 0 40  --    ALK PHOS U/L 57  --    ALT U/L 79*  --    AST U/L 43  --    GLUCOSE RANDOM mg/dL 105 103         Results from last 7 days  Lab Units 08/01/17  1216   INR  0 91     Imaging: CT scan report provided by patients mother reviewed, report revealed punctate stones in left kidney, stranding suggestive of perinephric abscess and L5 hemangioma     Micro:  Blood Culture:   Lab Results   Component Value Date    BLOODCX No Growth at 72 hrs  08/01/2017    BLOODCX No Growth at 72 hrs  08/01/2017     Urine Culture:   Lab Results   Component Value Date    URINECX No Growth <1000 cfu/mL 07/26/2017     Impression:  Patient Active Problem List   Diagnosis    Asthma    Morbid obesity    Perinephric abscess    Nephrolithiasis    Sepsis secondary to UTI-perinephric abscess     A/P: 29 Y/OM with history of asthma and nephrolithiasis presents with sepsis 2/2 left perinephric abscess     Sepsis  -Secondary to left perinephric abscess   -Blood cultures drawn 8/1/17 reviewed, no growth at 72 hours   -Urine cultures from 7/26/17 reviewed no growth   -Patient previously on Levaquin from 7/27/17 2 8/3/17   -febrile on presentation to [place] to T101F and leukocytosis to 14 with known urinary source  -As patient is allergic to penicillins and has tolerated Levaquin in the past and suspect abscess is provoked, will restart Levaquin  mg daily to be started after the blood and urine cultures obtained as well as wound culture during procedure  -will check lactic acid  -IR consultation placed for perinephric abscess drainage   -Dr Jud Fernando of urology aware patient and appreciate his input consultation placed   -Keep n p o  for now until IR procedure, will start regular diet following procedure, continue NSS 125ml/hr while NPO  -start Dilaudid 2mg IV Q4H PRN, Zofran 4mg IV Q6H PRN    Perinephric abscess  -see #1 for treatment    Asthma  -uses only herbal supplements at home, will d/c them while admitted, will start albuterol PRN, recommend o/p spirometry and establish care with PCP    Morbid Obesity  -BMI 36, will provide dietary and lifestyle modifications     Constipation  -start Colace and senna     Code Status  -full code as discussed with patient at bedside    VTE Pharmacologic Prophylaxis: Reason for no pharmacologic prophylaxis IR procedure later today  VTE Mechanical Prophylaxis: sequential compression device    Disposition:     BK Rodgers    Internal Medicine PGY-3  8/5/2017 10:35 AM

## 2017-08-06 LAB
ANION GAP SERPL CALCULATED.3IONS-SCNC: 7 MMOL/L (ref 4–13)
BACTERIA BLD CULT: NORMAL
BACTERIA BLD CULT: NORMAL
BASOPHILS # BLD AUTO: 0.04 THOUSANDS/ΜL (ref 0–0.1)
BASOPHILS NFR BLD AUTO: 0 % (ref 0–1)
BUN SERPL-MCNC: 7 MG/DL (ref 5–25)
CALCIUM SERPL-MCNC: 8.7 MG/DL (ref 8.3–10.1)
CHLORIDE SERPL-SCNC: 103 MMOL/L (ref 100–108)
CO2 SERPL-SCNC: 24 MMOL/L (ref 21–32)
CREAT SERPL-MCNC: 0.76 MG/DL (ref 0.6–1.3)
EOSINOPHIL # BLD AUTO: 0.12 THOUSAND/ΜL (ref 0–0.61)
EOSINOPHIL NFR BLD AUTO: 1 % (ref 0–6)
ERYTHROCYTE [DISTWIDTH] IN BLOOD BY AUTOMATED COUNT: 12.6 % (ref 11.6–15.1)
GFR SERPL CREATININE-BSD FRML MDRD: 122 ML/MIN/1.73SQ M
GLUCOSE SERPL-MCNC: 122 MG/DL (ref 65–140)
HCT VFR BLD AUTO: 34.2 % (ref 36.5–49.3)
HGB BLD-MCNC: 11.2 G/DL (ref 12–17)
LYMPHOCYTES # BLD AUTO: 2.39 THOUSANDS/ΜL (ref 0.6–4.47)
LYMPHOCYTES NFR BLD AUTO: 18 % (ref 14–44)
MCH RBC QN AUTO: 27.9 PG (ref 26.8–34.3)
MCHC RBC AUTO-ENTMCNC: 32.7 G/DL (ref 31.4–37.4)
MCV RBC AUTO: 85 FL (ref 82–98)
MONOCYTES # BLD AUTO: 1.85 THOUSAND/ΜL (ref 0.17–1.22)
MONOCYTES NFR BLD AUTO: 14 % (ref 4–12)
NEUTROPHILS # BLD AUTO: 8.73 THOUSANDS/ΜL (ref 1.85–7.62)
NEUTS SEG NFR BLD AUTO: 67 % (ref 43–75)
NRBC BLD AUTO-RTO: 0 /100 WBCS
PLATELET # BLD AUTO: 328 THOUSANDS/UL (ref 149–390)
PMV BLD AUTO: 9.7 FL (ref 8.9–12.7)
POTASSIUM SERPL-SCNC: 4 MMOL/L (ref 3.5–5.3)
RBC # BLD AUTO: 4.01 MILLION/UL (ref 3.88–5.62)
SODIUM SERPL-SCNC: 134 MMOL/L (ref 136–145)
WBC # BLD AUTO: 13.16 THOUSAND/UL (ref 4.31–10.16)

## 2017-08-06 PROCEDURE — 80048 BASIC METABOLIC PNL TOTAL CA: CPT | Performed by: INTERNAL MEDICINE

## 2017-08-06 PROCEDURE — 85025 COMPLETE CBC W/AUTO DIFF WBC: CPT | Performed by: INTERNAL MEDICINE

## 2017-08-06 RX ORDER — HYDROMORPHONE HCL 110MG/55ML
2 PATIENT CONTROLLED ANALGESIA SYRINGE INTRAVENOUS EVERY 4 HOURS PRN
Status: DISCONTINUED | OUTPATIENT
Start: 2017-08-06 | End: 2017-08-07 | Stop reason: HOSPADM

## 2017-08-06 RX ORDER — OXYCODONE HYDROCHLORIDE 5 MG/1
5 TABLET ORAL EVERY 6 HOURS PRN
Status: DISCONTINUED | OUTPATIENT
Start: 2017-08-06 | End: 2017-08-06

## 2017-08-06 RX ORDER — OXYCODONE HYDROCHLORIDE 5 MG/1
5 TABLET ORAL EVERY 6 HOURS PRN
Status: DISCONTINUED | OUTPATIENT
Start: 2017-08-06 | End: 2017-08-07 | Stop reason: HOSPADM

## 2017-08-06 RX ORDER — ACETAMINOPHEN 325 MG/1
650 TABLET ORAL EVERY 6 HOURS PRN
Status: DISCONTINUED | OUTPATIENT
Start: 2017-08-06 | End: 2017-08-07 | Stop reason: HOSPADM

## 2017-08-06 RX ORDER — KETOROLAC TROMETHAMINE 10 MG/1
10 TABLET, FILM COATED ORAL EVERY 6 HOURS PRN
Status: DISCONTINUED | OUTPATIENT
Start: 2017-08-06 | End: 2017-08-07 | Stop reason: HOSPADM

## 2017-08-06 RX ADMIN — OXYCODONE HYDROCHLORIDE 5 MG: 5 TABLET ORAL at 23:19

## 2017-08-06 RX ADMIN — HYDROMORPHONE HYDROCHLORIDE 2 MG: 2 INJECTION, SOLUTION INTRAMUSCULAR; INTRAVENOUS; SUBCUTANEOUS at 08:14

## 2017-08-06 RX ADMIN — DOCUSATE SODIUM 100 MG: 100 CAPSULE, LIQUID FILLED ORAL at 08:14

## 2017-08-06 RX ADMIN — LEVOFLOXACIN 750 MG: 5 INJECTION, SOLUTION INTRAVENOUS at 15:42

## 2017-08-06 RX ADMIN — HYDROMORPHONE HYDROCHLORIDE 2 MG: 2 INJECTION, SOLUTION INTRAMUSCULAR; INTRAVENOUS; SUBCUTANEOUS at 01:37

## 2017-08-06 RX ADMIN — ALBUTEROL SULFATE 2 PUFF: 90 AEROSOL, METERED RESPIRATORY (INHALATION) at 17:28

## 2017-08-06 RX ADMIN — SENNOSIDES 8.6 MG: 8.6 TABLET, FILM COATED ORAL at 08:14

## 2017-08-06 RX ADMIN — SODIUM CHLORIDE 125 ML/HR: 0.9 INJECTION, SOLUTION INTRAVENOUS at 03:28

## 2017-08-06 RX ADMIN — DOCUSATE SODIUM 100 MG: 100 CAPSULE, LIQUID FILLED ORAL at 17:27

## 2017-08-06 NOTE — PROGRESS NOTES
IM Residency Progress Note   Unit/Bed#: [facility] 911-01 Encounter: [id number]  SOD Team B       Adriana Castano 29 y o  male [id number]    Hospital Stay Days: 1      Assessment/Plan:    Principal Problem:    Sepsis secondary to UTI-perinephric abscess  Active Problems:    Asthma    Morbid obesity    Perinephric abscess    Nephrolithiasis    Sepsis secondary to perinephric abscess - improving   -S/P aspiration and drainage of abscess on 8/5, 45cc output since placement of drain  -Blood cultures drawn 8/1/17 reviewed, no growth at 72 hours   -Urine cultures from 7/26/17 reviewed no growth   Afebrile since admission, leukocytosis improving  -As patient is allergic to penicillins and has tolerated Levaquin in the past and suspect abscess is provoked, continue with Levaquin  mg daily   -Continue to follow with Urology recommendations  -Pain regimen: acetaminophen for mild pain, toradol for moderate pain, oxycodone for severe pain and dilaudid for breakthrough pain  -zofran PRN for nausea     Perinephric abscess  -management as above     Asthma  -uses only herbal supplements at home, will d/c them while admitted, will start albuterol PRN, recommend o/p spirometry and establish care with PCP     Morbid Obesity  -BMI 36, will provide dietary and lifestyle modifications      Constipation  Denied BM over 48 hours, complains of dull discomfort/pain in the LLQ  -active bowel sounds on exam  -continue Colace and senna     Disposition: Continue to monitor clinically at this time with continued abx regimen  Follow for culture results, follow with urology recommendations regarding further care  Subjective:   Patient seen and examined in bed this morning with mother present  Patient states that he is having 7/10 dull LLQ pains which are constant and nonradiating, has not had BM in 48 hours  He denied any fever/chills, n/v, c/p, SOB, cough or palpitation   He is concerned about discharge plan with drain in place as well as medication regimen and ability to perform work, explained to patient and mother regarding discharge plan and the need for care of drain as well as work time off too  Vitals: Temp (24hrs), Av °F (37 2 °C), Min:98 °F (36 7 °C), Max:99 9 °F (37 7 °C)  Current: Temperature: 99 9 °F (37 7 °C)  Vitals:    17 1327 17 1328 17 1500 17 2242   BP:   128/65 121/58   Pulse: 90 87 86 99   Resp:   18 18   Temp:   98 °F (36 7 °C) 99 9 °F (37 7 °C)   TempSrc:   Oral Oral   SpO2:   100% 98%   Weight:       Height:        Body mass index is 36 25 kg/m²  I/O last 24 hours: In: 2641 7 [P O :225; I V :2266 7; IV Piggyback:150]  Out: 345 [Urine:300; Drains:45]      Physical Exam: /58   Pulse 99   Temp 99 9 °F (37 7 °C) (Oral)   Resp 18   Ht 6' 3" (1 905 m)   Wt 132 kg (290 lb)   SpO2 98%   BMI 36 25 kg/m²     General Appearance:    Obese male lying comfortably in bed  Alert, cooperative, no distress, appears stated age   Head:    Normocephalic, without obvious abnormality, atraumatic   Throat:   Lips, mucosa, and tongue normal; teeth and gums normal   Neck:   Supple, symmetrical, trachea midline, no adenopathy   Lungs:     Clear to auscultation bilaterally, respirations unlabored   Chest wall:    No tenderness or deformity   Heart:    Regular rate and rhythm, S1 and S2 normal   Abdomen:     Soft, tender to palpation in LLQ, bowel sounds active all four quadrants  GREGORIO drain in place in LUQ draining sanguinous fluid, dressing clean, dry and intact     Extremities:   Extremities normal, atraumatic, no cyanosis or edema   Pulses:   2+ and symmetric all extremities   Skin:   Skin color, texture, turgor normal, no rashes or lesions   Lymph nodes:   Cervical, supraclavicular, and axillary nodes normal   Neurologic:   Answering questions appropriately, fluent speech, aaox3        Invasive Devices     Peripheral Intravenous Line            Peripheral IV 17 2 days          Drain Closed/Suction Drain Left Perineal Bulb 8 5 Fr  less than 1 day                          Labs:   Recent Results (from the past 24 hour(s))   CBC and Platelet    Collection Time: 08/05/17 10:29 AM   Result Value Ref Range    WBC 14 55 (H) 4 31 - 10 16 Thousand/uL    RBC 4 11 3 88 - 5 62 Million/uL    Hemoglobin 11 7 (L) 12 0 - 17 0 g/dL    Hematocrit 34 3 (L) 36 5 - 49 3 %    MCV 84 82 - 98 fL    MCH 28 5 26 8 - 34 3 pg    MCHC 34 1 31 4 - 37 4 g/dL    RDW 12 7 11 6 - 15 1 %    Platelets 361 130 - 812 Thousands/uL    MPV 9 2 8 9 - 12 7 fL   Basic metabolic panel    Collection Time: 08/05/17 10:29 AM   Result Value Ref Range    Sodium 137 136 - 145 mmol/L    Potassium 4 1 3 5 - 5 3 mmol/L    Chloride 104 100 - 108 mmol/L    CO2 27 21 - 32 mmol/L    Anion Gap 6 4 - 13 mmol/L    BUN 7 5 - 25 mg/dL    Creatinine 0 77 0 60 - 1 30 mg/dL    Glucose 100 65 - 140 mg/dL    Calcium 8 3 8 3 - 10 1 mg/dL    eGFR 122 ml/min/1 73sq m   Protime-INR    Collection Time: 08/05/17 10:29 AM   Result Value Ref Range    Protime 15 1 (H) 12 1 - 14 4 seconds    INR 1 18 (H) 0 86 - 1 16   APTT    Collection Time: 08/05/17 10:29 AM   Result Value Ref Range    PTT 36 (H) 23 - 35 seconds   Lactic acid, plasma    Collection Time: 08/05/17 11:06 AM   Result Value Ref Range    LACTIC ACID 0 7 0 5 - 2 0 mmol/L   UA w Reflex to Microscopic w Reflex to Culture    Collection Time: 08/05/17 12:35 PM   Result Value Ref Range    Color, UA Yellow     Clarity, UA Clear     Specific Gravity, UA 1 027 1 003 - 1 030    pH, UA 7 0 4 5 - 8 0    Leukocytes, UA Negative Negative    Nitrite, UA Negative Negative    Protein, UA Negative Negative mg/dl    Glucose, UA Negative Negative mg/dl    Ketones, UA Negative Negative mg/dl    Urobilinogen, UA 0 2 0 2, 1 0 E U /dl E U /dl    Bilirubin, UA Negative Negative    Blood, UA Moderate (A) Negative   Urine Microscopic    Collection Time: 08/05/17 12:35 PM   Result Value Ref Range    RBC, UA 20-30 (A) None Seen /hpf WBC, UA 4-10 (A) None Seen /hpf    Epithelial Cells None Seen None Seen, Occasional /hpf    Bacteria, UA None Seen None Seen, Occasional /hpf    Hyaline Casts, UA None Seen None Seen /lpf   Body fluid culture and Gram stain    Collection Time: 08/05/17  1:32 PM   Result Value Ref Range    Gram Stain Result Rare Polys     Gram Stain Result No bacteria seen    CBC and differential    Collection Time: 08/06/17  4:52 AM   Result Value Ref Range    WBC 13 16 (H) 4 31 - 10 16 Thousand/uL    RBC 4 01 3 88 - 5 62 Million/uL    Hemoglobin 11 2 (L) 12 0 - 17 0 g/dL    Hematocrit 34 2 (L) 36 5 - 49 3 %    MCV 85 82 - 98 fL    MCH 27 9 26 8 - 34 3 pg    MCHC 32 7 31 4 - 37 4 g/dL    RDW 12 6 11 6 - 15 1 %    MPV 9 7 8 9 - 12 7 fL    Platelets 454 428 - 795 Thousands/uL    nRBC 0 /100 WBCs    Neutrophils Relative 67 43 - 75 %    Lymphocytes Relative 18 14 - 44 %    Monocytes Relative 14 (H) 4 - 12 %    Eosinophils Relative 1 0 - 6 %    Basophils Relative 0 0 - 1 %    Neutrophils Absolute 8 73 (H) 1 85 - 7 62 Thousands/µL    Lymphocytes Absolute 2 39 0 60 - 4 47 Thousands/µL    Monocytes Absolute 1 85 (H) 0 17 - 1 22 Thousand/µL    Eosinophils Absolute 0 12 0 00 - 0 61 Thousand/µL    Basophils Absolute 0 04 0 00 - 0 10 Thousands/µL   Basic metabolic panel    Collection Time: 08/06/17  4:52 AM   Result Value Ref Range    Sodium 134 (L) 136 - 145 mmol/L    Potassium 4 0 3 5 - 5 3 mmol/L    Chloride 103 100 - 108 mmol/L    CO2 24 21 - 32 mmol/L    Anion Gap 7 4 - 13 mmol/L    BUN 7 5 - 25 mg/dL    Creatinine 0 76 0 60 - 1 30 mg/dL    Glucose 122 65 - 140 mg/dL    Calcium 8 7 8 3 - 10 1 mg/dL    eGFR 122 ml/min/1 73sq m       Radiology Results: I have personally reviewed pertinent reports  Other Diagnostic Testing:   I have personally reviewed pertinent reports          Active Meds:   Current Facility-Administered Medications   Medication Dose Route Frequency    albuterol (PROVENTIL HFA,VENTOLIN HFA) inhaler 2 puff  2 puff Inhalation Q6H PRN    aluminum-magnesium hydroxide-simethicone (MYLANTA) 200-200-20 mg/5 mL oral suspension 30 mL  30 mL Oral Q6H PRN    docusate sodium (COLACE) capsule 100 mg  100 mg Oral BID    HYDROmorphone (DILAUDID) 2 mg/mL injection 2 mg  2 mg Intravenous Q4H PRN    levofloxacin (LEVAQUIN) IVPB (premix) 750 mg  750 mg Intravenous Q24H    ondansetron (ZOFRAN) injection 4 mg  4 mg Intravenous Q6H PRN    senna (SENOKOT) tablet 8 6 mg  1 tablet Oral Daily    sodium chloride 0 9 % infusion  125 mL/hr Intravenous Continuous         VTE Pharmacologic Prophylaxis: Reason for no pharmacologic prophylaxis hematuria  VTE Mechanical Prophylaxis: sequential compression device    Marie Crawford MD

## 2017-08-06 NOTE — PROGRESS NOTES
Patient Identifiers: Yanci Miranda (MRN [id number])  Service Requesting Consultation: [facility]  Service Providing Consultation:  Urology, Maite Chadwick MD    Date of Service: 8/6/2017  Consults  Reason for Consultation: Sepsis, Perinephric abscess    History of Present Illness:     Yanci Miranda is a 29 y o  old male who presents with severe left flank pain and fever  Had recent ureteroscopy and stone extraction with Dr Konrad Sullivan  Patient developed severe pain following stent extraction earlier this week  Progressed to fever >101  Seen at [facility], [place] suggested perinephric abscess  Transferred for management  No chest pain  No shortness of breath  He has history of asthma and he has been taking some over-the-counter medication  He used to use albuterol in the past and has not required it for long time  No dizziness and has no headache  IR drain since placed  Patient notes constant flank pain and constipation  Drainage serosanguinous    Fever improving        Past Medical, Past Surgical History:     Past Medical History:   Diagnosis Date    Asthma     Hypertension     Nephrolithiasis    :    Past Surgical History:   Procedure Laterality Date    DENTAL SURGERY      MT CYSTO/URETERO W/LITHOTRIPSY &INDWELL STENT INSRT Left 7/26/2017    Procedure: CYSTOSCOPY URETEROSCOPY WITH LITHOTRIPSY HOLMIUM LASER, RETROGRADE PYELOGRAM AND INSERTION STENT URETERAL;  Surgeon: Keri Wong MD;  Location: [facility];  Service: Urology   :    Medications, Allergies:     Current Facility-Administered Medications   Medication Dose Route Frequency    acetaminophen (TYLENOL) tablet 650 mg  650 mg Oral Q6H PRN    albuterol (PROVENTIL HFA,VENTOLIN HFA) inhaler 2 puff  2 puff Inhalation Q6H PRN    aluminum-magnesium hydroxide-simethicone (MYLANTA) 200-200-20 mg/5 mL oral suspension 30 mL  30 mL Oral Q6H PRN    docusate sodium (COLACE) capsule 100 mg  100 mg Oral BID    HYDROmorphone (DILAUDID) 2 mg/mL injection 2 mg  2 mg Intravenous Q4H PRN    ketorolac (TORADOL) tablet 10 mg  10 mg Oral Q6H PRN    levofloxacin (LEVAQUIN) IVPB (premix) 750 mg  750 mg Intravenous Q24H    ondansetron (ZOFRAN) injection 4 mg  4 mg Intravenous Q6H PRN    oxyCODONE (ROXICODONE) IR tablet 5 mg  5 mg Oral Q6H PRN    senna (SENOKOT) tablet 8 6 mg  1 tablet Oral Daily    sodium chloride 0 9 % infusion  125 mL/hr Intravenous Continuous       Allergies: Allergies   Allergen Reactions    Amoxicillin Hives    Penicillins Hives   :    Social and Family History:   Social History:   Social History   Substance Use Topics    Smoking status: Never Smoker    Smokeless tobacco: Never Used    Alcohol use Yes      Comment: OCCASIONALLY     History   Smoking Status    Never Smoker   Smokeless Tobacco    Never Used       Family History:  Family History   Problem Relation Age of Onset    Diabetes Father     Hypertension Father     Diabetes Maternal Grandfather    :     Review of Systems:     General: Fever, chills, or night sweats: positive  Cardiac: Negative for chest pain  Pulmonary: Negative for shortness of breath  Gastrointestinal: Abdominal pain positive  Nausea, vomiting, or diarrhea negative,  Genitourinary: See HPI above  Patient does not have hematuria  All other systems queried were negative  Physical Exam:   I/O last 24 hours: In: 2641 7 [P O :225; I V :2266 7; IV Piggyback:150]  Out: 345 [Urine:300; Drains:45]  Temp (24hrs), Av °F (37 2 °C), Min:98 °F (36 7 °C), Max:99 9 °F (37 7 °C)  current: Temperature: 99 °F (37 2 °C)  General: Patient is pleasant and in NAD   Awake and alert  /58   Pulse 78   Temp 99 °F (37 2 °C) (Oral)   Resp 18   Ht 6' 3" (1 905 m)   Wt 132 kg (290 lb)   SpO2 98%   BMI 36 25 kg/m²   /58   Pulse 78   Temp 99 °F (37 2 °C) (Oral)   Resp 18   Ht 6' 3" (1 905 m)   Wt 132 kg (290 lb)   SpO2 98%   BMI 36 25 kg/m²   General appearance: alert, appears stated age, cooperative and mild distress  Head: Normocephalic, without obvious abnormality, atraumatic  Lungs: clear to auscultation bilaterally  Heart: regular rate and rhythm, S1, S2 normal, no murmur, click, rub or gallop  Abdomen: soft, non-tender; bowel sounds normal; no masses,  no organomegaly  Extremities: extremities normal, atraumatic, no cyanosis or edema  Neurologic: Grossly normal  CROWE: none        Labs:     Lab Results   Component Value Date    HGB 11 2 (L) 08/06/2017    HCT 34 2 (L) 08/06/2017    WBC 13 16 (H) 08/06/2017     08/06/2017   ]    Lab Results   Component Value Date     (L) 08/06/2017    K 4 0 08/06/2017     08/06/2017    CO2 24 08/06/2017    BUN 7 08/06/2017    CREATININE 0 76 08/06/2017    CALCIUM 8 7 08/06/2017    GLUCOSE 122 08/06/2017   ]    Imaging:   I personally reviewed the images and report of the following studies, and reviewed them with the patient:  IR abscess/seroma drainage   Final Result   Impression:    Successful image-guided percutaneous drainage of left perinephric fluid collection          Workstation performed: [id number]         Images reviewed with IR team    ASSESSMENT:     29 y o  old male with  fever and possible perinephric abscess  PLAN:     -FU on culture results  -Continue drain  -Pain control  Symptoms will likely require several weeks to resolve  Discussed with patient  Thank you for allowing me to participate in this patients care  Please do not hesitate to call with any additional questions    Eliz Romo MD

## 2017-08-07 VITALS
TEMPERATURE: 99.1 F | HEART RATE: 70 BPM | HEIGHT: 75 IN | OXYGEN SATURATION: 99 % | RESPIRATION RATE: 18 BRPM | SYSTOLIC BLOOD PRESSURE: 121 MMHG | BODY MASS INDEX: 36.06 KG/M2 | DIASTOLIC BLOOD PRESSURE: 66 MMHG | WEIGHT: 290 LBS

## 2017-08-07 LAB
ANION GAP SERPL CALCULATED.3IONS-SCNC: 8 MMOL/L (ref 4–13)
BUN SERPL-MCNC: 7 MG/DL (ref 5–25)
CALCIUM SERPL-MCNC: 8.9 MG/DL (ref 8.3–10.1)
CHLORIDE SERPL-SCNC: 105 MMOL/L (ref 100–108)
CO2 SERPL-SCNC: 25 MMOL/L (ref 21–32)
CREAT SERPL-MCNC: 0.8 MG/DL (ref 0.6–1.3)
ERYTHROCYTE [DISTWIDTH] IN BLOOD BY AUTOMATED COUNT: 12.5 % (ref 11.6–15.1)
GFR SERPL CREATININE-BSD FRML MDRD: 120 ML/MIN/1.73SQ M
GLUCOSE SERPL-MCNC: 102 MG/DL (ref 65–140)
HCT VFR BLD AUTO: 35.5 % (ref 36.5–49.3)
HGB BLD-MCNC: 11.7 G/DL (ref 12–17)
MCH RBC QN AUTO: 27.9 PG (ref 26.8–34.3)
MCHC RBC AUTO-ENTMCNC: 33 G/DL (ref 31.4–37.4)
MCV RBC AUTO: 85 FL (ref 82–98)
PLATELET # BLD AUTO: 401 THOUSANDS/UL (ref 149–390)
PMV BLD AUTO: 9.7 FL (ref 8.9–12.7)
POTASSIUM SERPL-SCNC: 4.2 MMOL/L (ref 3.5–5.3)
RBC # BLD AUTO: 4.19 MILLION/UL (ref 3.88–5.62)
SODIUM SERPL-SCNC: 138 MMOL/L (ref 136–145)
WBC # BLD AUTO: 11.46 THOUSAND/UL (ref 4.31–10.16)

## 2017-08-07 PROCEDURE — 80048 BASIC METABOLIC PNL TOTAL CA: CPT | Performed by: INTERNAL MEDICINE

## 2017-08-07 PROCEDURE — 85027 COMPLETE CBC AUTOMATED: CPT | Performed by: INTERNAL MEDICINE

## 2017-08-07 RX ORDER — LEVOFLOXACIN 750 MG/1
750 TABLET ORAL EVERY 24 HOURS
Qty: 11 TABLET | Refills: 0 | Status: SHIPPED | OUTPATIENT
Start: 2017-08-07 | End: 2017-08-22 | Stop reason: HOSPADM

## 2017-08-07 RX ORDER — KETOROLAC TROMETHAMINE 10 MG/1
10 TABLET, FILM COATED ORAL EVERY 6 HOURS PRN
Qty: 14 TABLET | Refills: 0 | Status: SHIPPED | OUTPATIENT
Start: 2017-08-07 | End: 2017-08-17

## 2017-08-07 RX ADMIN — OXYCODONE HYDROCHLORIDE 5 MG: 5 TABLET ORAL at 10:23

## 2017-08-07 RX ADMIN — SENNOSIDES 8.6 MG: 8.6 TABLET, FILM COATED ORAL at 10:22

## 2017-08-07 RX ADMIN — LEVOFLOXACIN 750 MG: 5 INJECTION, SOLUTION INTRAVENOUS at 13:19

## 2017-08-07 RX ADMIN — KETOROLAC TROMETHAMINE 10 MG: 10 TABLET, FILM COATED ORAL at 13:25

## 2017-08-07 RX ADMIN — DOCUSATE SODIUM 100 MG: 100 CAPSULE, LIQUID FILLED ORAL at 10:22

## 2017-08-07 NOTE — DISCHARGE SUMMARY
IMR Discharge Summary - Medical Smiley Johnston 29 y o  male MRN: [id number]    [address] [facility] Room / Bed: [place] 911/[place] 911-01 Encounter: [id number]    BRIEF OVERVIEW    Admitting Provider: No Leon MD  Discharge Provider: No Leon MD  Primary Care Physician at Discharge: Shemar Doe MD    Discharge To: Home    Admission Date: 8/5/2017     Discharge Date: No discharge date for patient encounter  Primary Discharge Diagnosis  Principal Problem:    Sepsis secondary to UTI-perinephric abscess  Active Problems:    Asthma    Morbid obesity    Perinephric abscess    Nephrolithiasis  Resolved Problems:    * No resolved hospital problems   *      Consulting Providers   Dr Rebecca Palma (Urology)       Therapeutic Operative Procedures Performed  Image-guided percutaneous drainage of left perinephric fluid collection with 8 Uzbek Drain placed    Diagnostic Procedures Performed  IR Abscess/seroma Drainage    Result Date: 8/6/2017  Impression: Impression: Successful image-guided percutaneous drainage of left perinephric fluid collection Workstation performed: [id number]     Results from last 7 days  Lab Units 08/07/17  0453 08/06/17  0452 08/05/17  1029 08/01/17  1216   SODIUM mmol/L 138 134* 137 137   POTASSIUM mmol/L 4 2 4 0 4 1 4 3   CHLORIDE mmol/L 105 103 104 101   CO2 mmol/L 25 24 27 29   BUN mg/dL 7 7 7 11   CREATININE mg/dL 0 80 0 76 0 77 1 06   CALCIUM mg/dL 8 9 8 7 8 3 9 1   TOTAL PROTEIN g/dL  --   --   --  7 9   BILIRUBIN TOTAL mg/dL  --   --   --  0 40   ALK PHOS U/L  --   --   --  57   ALT U/L  --   --   --  79*   AST U/L  --   --   --  43   GLUCOSE RANDOM mg/dL 102 122 100 105       CBC   Order: [id number]   Status:  Final result   Visible to patient:  Yes (MyChart Bedside) Next appt:  08/10/2017 at 10:15 AM in Radiology ([facility] CT 1)     Ref Range & Units 8/7/17 0453 8/6/17 0452    WBC 4 31 - 10 16 Thousand/uL 11 46  13 16     RBC 3 88 - 5 62 Million/uL 4  19 4 01    Hemoglobin 12 0 - 17 0 g/dL 11 7  11 2     Hematocrit 36 5 - 49 3 % 35 5  34 2     MCV 82 - 98 fL 85 85    MCH 26 8 - 34 3 pg 27 9 27 9    MCHC 31 4 - 37 4 g/dL 33 0 32 7    RDW 11 6 - 15 1 % 12 5 12 6    Platelets 498 - 363 Thousands/uL 401  328    MPV 8 9 - 12 7 fL 9 7 9 7             Discharge Disposition: Home/Self Care  Discharged With Lines: yes    Type: Closed/Suction Drain Left Perineal Bulb 8 5 Fr  Reason for line: Drainage of Perinephric Abscess  When to remove: Per Urology as Outpatient Dr Luis Elkins managed by: IR and Urology           Test Results Pending at Discharge:   Perinephric Fluid Final culture results pending   Blood Cultures preliminary report no growth at 48 hours       Outpatient Follow-Up  yes      Follow up: Dr Hannah Amaral  Follow up within next: 1 week for follow up for GREGORIO drain and Perinephric abscess    Code Status: Level 1 - Full Code    Medications   STOP taking these medications    morphine 15 mg tablet   Commonly known as: MSIR     ondansetron 4 mg tablet   Commonly known as: ZOFRAN     oxyCODONE-acetaminophen 5-325 mg per tablet   Commonly known as: PERCOCET    TAKE these medications     TAKE these medications     Morning Afternoon Evening Bedtime As Needed    HAWTHORN BERRIES PO   Take 60 mL by mouth daily   Refills: 0   Next Dose Due: 8/8/17 0900           ketorolac 10 mg tablet   Commonly known as: TORADOL   Take 1 tablet by mouth every 6 (six) hours as needed for moderate pain for up to 5 days   Refills: 0           levofloxacin 750 mg tablet   Commonly known as: LEVAQUIN   Take 1 tablet by mouth every 24 hours for 11 days   Refills: 0   Next Dose Due: 8/8/17 1400           multivitamin Tabs   Take 1 tablet by mouth daily   Refills: 0   Next Dose Due: 8/8/17 0900                       Allergies  Allergies   Allergen Reactions    Amoxicillin Hives    Penicillins Hives     Discharge Diet: regular diet  Activity restrictions: none    [address] [facility]  Mr  Stacie Mukherjee with history of nephrolithiasis and asthma who presented on 8/5/2017 from [facility] center at the request of Dr Rosemary Bray (urology) for left sided perinephric abscess and sepsis  He had lithotripsy with stent placement on 7/26/2017, which was pulled on 7/28/2017  He was on a course of Ciprofloxacin for UTI and was on Percocet for pain  He also had a visit to the Emergency Department for left sided pain hydronephrosis and hydroureter and was placed on ciprofloxacin and was instructed to follow with Urology as an outpatient  However his left sided flank pain escalated and had spiking temperatures  Upon his transfer to [facility] from [facility] on 8/5/2017, blood cultures, urine cultures, and wound cultures were obtained, he was restarted on IV Levaquin (was on 7/27/2017-8/3/2017), lactic acid levels were obtained,  Was kept NPO, started on Normal Saline, started Dilaudid 2 mg IV and Zofran 4 mg IV prn, and consulted Interventional Radiology  Patient was declared to have sepsis with a fever of 101, leukocytosis, and a known urinary source of infection and was treated  Interventional Radiology performed an image-guided percutaneous drainage of the left sided perinephric abscess fluid collection and placed a Closed/Suction Drain Left Perineal Bulb 8 5 [facility]  Urology was consulted and managed his antibiotic and GREGORIO drain  Patient will go home on PO Levofloxacin to complete an 14 day course of antibiotics, with an 11 day supply  He will also go home on Toradol for his pain control  Patient also needed a note for work as he can not do strenuous work with his GREGORIO drain placed  Patient was constipated and was started on a bowel prep of Senna and Colace  Patient's asthma is controlled at home with herbal supplements, but int he hospital he was managed with albuterol treatments as needed     For the patient's morbid obesity he was counseled on dietary and lifestyle modifications      Patient will follow with Dr Will Loomis as outpatient and will see a PCP  Presenting Problem/History of Present Illness  Principal Problem:    Sepsis secondary to UTI-perinephric abscess  Active Problems:    Asthma    Morbid obesity    Perinephric abscess    Nephrolithiasis  Resolved Problems:    * No resolved hospital problems  *      Discharge Condition: stable    Discharge  Statement   I spent 45 minutes minutes discharging the patient  This time was spent on the day of discharge  I had direct contact with the patient on the day of discharge  Time was spent coordinating care with Urology, Nursing, and Case Management

## 2017-08-07 NOTE — SOCIAL WORK
CM met with the Pt and family at bedside to discuss any D/C needs  Pt's family member states she can provide drain care at home  RN updated to provide teaching prior to D/C  Pt's family can provide transportation home, no further needs identified at this time

## 2017-08-07 NOTE — CASE MANAGEMENT
Initial Clinical Review    Admission: Date/Time/Statement: 8/5/17 @ 1011     Orders Placed This Encounter   Procedures    Inpatient Admission     Standing Status:   Standing     Number of Occurrences:   1     Order Specific Question:   Admitting Physician     Answer:   Demetria Perez [[id number]]     Order Specific Question:   Level of Care     Answer:   Med Surg [[id number]]     Order Specific Question:   Estimated length of stay     Answer:   More than 2 Midnights     Order Specific Question:   Certification     Answer:   I certify that inpatient services are medically necessary for this patient for a duration of greater than two midnights  See H&P and MD Progress Notes for additional information about the patient's course of treatment  Date/Time/Mode of Arrival: 8/5 Transfer from [facility]     Chief Complaint:  Sepsis 2/2 Perinephric abscess    History of Illness: 29 y o  male with past medical history of asthma and nephrolithiasis who presents as a transfer from [facility] at the request of Dr Adali Barrow for left sided perinephric abscess and sepsis  The patient had lithotripsy with stent placement on the 7/26/17  The stent was then pulled on 7/28/17 and the patient reports that he had been having significant left-sided flank pain since then  He was previously taking ciprofloxacin for UTI, and Percocet for pain  He was seen in the ED and campus on 8/1/17 for left-sided flank pain hydronephrosis and hydroureter  He was discharged from the ED to home to complete a seven-day course of ciprofloxacin and was advised to follow-up with urology as an outpatient  He was seen as an outpatient yesterday by urology and was cleared for return to work  However overnight started spiking temperatures and had continued significant left-sided flank pain so he went to [facility] ED for further evaluation   He was admitted to [facility] and was transferred to [facility] for continuity of care with Dr Deepika Hough in his urology team  He complains of significant left-sided pain today reports that the morphine does not help, has some intermittent nausea with no vomiting and also complains of constipation  Vital Signs:   Triage Vitals   Temperature Pulse Respirations Blood Pressure SpO2   08/05/17 0700 08/05/17 0700 08/05/17 0700 08/05/17 0700 08/05/17 0700   99 2 °F (37 3 °C) 83 18 131/70 98 %      Temp Source Heart Rate Source Patient Position BP Location FiO2 (%)   08/05/17 0700 08/05/17 2242 08/05/17 1500 08/05/17 1500 --   Oral Monitor Lying Right arm       Pain Score       08/05/17 0842       5        Wt Readings from Last 1 Encounters:   08/05/17 132 kg (290 lb)     Vital Signs (abnormal):   Date/Time  Temp  Pulse  Resp  BP  SpO2  O2 Device  Patient Position     08/06/17 0743  99 °F (37 2 °C)  78  18  120/58  98 %  None (Room air)  Lying   08/05/17 2242  99 9 °F (37 7 °C)  99  18  121/58  98 %  None (Room air)  Lying   08/05/17 1500  98 °F (36 7 °C)  86  18  128/65  100 %  None (Room air)  Lying     Abnormal Labs:   Updated: 08/05/17 1038       WBC 14 55 (H) 4 31 - 10 16 Thousand/uL     Diagnostic Test Results: IR Abscess/ Seroma drainage - Successful image-guided percutaneous drainage of left perinephric fluid collection     Past Medical/Surgical History:    Active Ambulatory Problems     Diagnosis Date Noted    Asthma 07/25/2017    Morbid obesity 07/25/2017    Perinephric abscess 08/05/2017     Resolved Ambulatory Problems     Diagnosis Date Noted    Ureteric stone 07/25/2017    Hydronephrosis, left 07/25/2017    Nephrolithiasis 07/25/2017    ELINA (acute kidney injury) 07/25/2017    Accelerated hypertension 07/25/2017     Past Medical History:   Diagnosis Date    Asthma     Hypertension     Nephrolithiasis      Admitting Diagnosis: Abscess [L02 91]    Age/Sex: 29 y o  male    Assessment/Plan:   Impression:      Patient Active Problem List   Diagnosis    Asthma    Morbid obesity    Perinephric abscess    Nephrolithiasis    Sepsis secondary to UTI-perinephric abscess      A/P: 29 Y/OM with history of asthma and nephrolithiasis presents with sepsis 2/2 left perinephric abscess      Sepsis  -Secondary to left perinephric abscess   -Blood cultures drawn 8/1/17 reviewed, no growth at 72 hours   -Urine cultures from 7/26/17 reviewed no growth   -Patient previously on Levaquin from 7/27/17 2 8/3/17   -febrile on presentation to [place] to T101F and leukocytosis to 14 with known urinary source  -As patient is allergic to penicillins and has tolerated Levaquin in the past and suspect abscess is provoked, will restart Levaquin  mg daily to be started after the blood and urine cultures obtained as well as wound culture during procedure  -will check lactic acid  -IR consultation placed for perinephric abscess drainage   -Dr Adriana Hollins of urology aware patient and appreciate his input consultation placed   -Keep n p o  for now until IR procedure, will start regular diet following procedure, continue NSS 125ml/hr while NPO  -start Dilaudid 2mg IV Q4H PRN, Zofran 4mg IV Q6H PRN     Perinephric abscess  -see #1 for treatment     Asthma  -uses only herbal supplements at home, will d/c them while admitted, will start albuterol PRN, recommend o/p spirometry and establish care with PCP     Morbid Obesity  -BMI 36, will provide dietary and lifestyle modifications      Constipation  -start Colace and senna      Code Status  -full code as discussed with patient at bedside     VTE Pharmacologic Prophylaxis: Reason for no pharmacologic prophylaxis IR procedure later today  VTE Mechanical Prophylaxis: sequential compression device       Admission Orders:  Bld culture x2  Body fluid and Gram stain  Sequential compressiond device    Scheduled Meds:   docusate sodium 100 mg Oral BID   levofloxacin 750 mg Intravenous Q24H   senna 1 tablet Oral Daily     Continuous Infusions:   sodium chloride 125 mL/hr Last Rate: Stopped (08/07/17 0005)     PRN Meds:     acetaminophen    albuterol    aluminum-magnesium hydroxide-simethicone    HYDROmorphone Iv x5    Ketorolac Iv x1    ondansetron    oxyCODONE po x1

## 2017-08-07 NOTE — PROGRESS NOTES
IM Residency Progress Note   Unit/Bed#: [facility] 911-01 Encounter: [id number]  SOD Team B       Artemio Treviño 29 y o  male [id number]    Hospital Stay Days: 2      Assessment/Plan:    Principal Problem:    Sepsis secondary to UTI-perinephric abscess  Active Problems:    Asthma    Morbid obesity    Perinephric abscess    Nephrolithiasis       1  Sepsis secondary to perinephric abscess  -S/P aspiration and drainage of abscess on 8/5/2017  -GREGORIO drain in place, with minimal bloody output overnight    -Body fluid culture and Gram stain from perinephric abscess from 8/6/2017 reveal rare polyps and and no bacteria in preliminary report  Will continue to follow     -Blood cultures drawn 8/1/2017 show no growth after 5 days    -Blood cultures drawn 8/5/2017 show no growth at 24 hours  -Urine cultures from 7/26/17 reviewed no growth  - Afebrile since admission, leukocytosis improving to 11 on 8/7/2017   -Continue with Levaquin  mg daily, will transition to PO when planning discharge  -Continue to follow with Urology recommendations, greatly appreciated  -Urology reports to continue drain and flush per IR and also stated patient is stable to discharge from urology standpoint  -Pain regimen: acetaminophen for mild pain, toradol for moderate pain, oxycodone for severe pain and dilaudid for breakthrough pain  -Zofran PRN for nausea     2  Asthma  -Uses only herbal supplements ("Clear Lungs") at home, were not continued in hospital  -Started with albuterol PRN,   -Recommended to establish care with PCP and outpatient spirometry      3  Morbid Obesity  -BMI 36  -Will provide dietary and lifestyle modifications      4  Constipation  - Had a bowel movement yesterday and patient stated he feels like he has to go but could no this morning   -Had LLQ that is now generalized to the lower abdomen    -Normal active bowel sounds in all four quadrants on exam  -Continue Colace and Senna     Disposition:  Patient is improving clinically at this time  Urology has cleared patient for discharge  Will plan for discharge today with antibiotics, pain control  Will set up follow up as outpatient with Dr Duc Sung and Primary Care Physician  Subjective:   Mr Ludy Ortiz is sitting comfortably up in bed in, he is in mild distress right now due to pain  Pain is rated 6 out of 10 currently, he states the pain is worse with movement and better with rest  He still has pain at the GREGORIO drain site which is making minimal bloody output  He complains that he is feeling warm, he complains of mild lower abdominal pain, but reports he has not had a bowel movement since yesterday although he feels like he has to go  He denies any dysuria, headache, nausea, lack of appetite  He denies chest pain, shortness of breath, and chills  Discussed with patient the plan for today  And he is agreeable for waiting for urology's input  Vitals: Temp (24hrs), Av 3 °F (37 4 °C), Min:99 °F (37 2 °C), Max:99 6 °F (37 6 °C)  Current: Temperature: 99 5 °F (37 5 °C)  Vitals:    17 0858 17 1500 17 2338 17 0705   BP:  125/72 126/59 124/59   Pulse:  82 76 67   Resp:  17 18 18   Temp:  99 °F (37 2 °C) 99 6 °F (37 6 °C) 99 5 °F (37 5 °C)   TempSrc:  Oral Oral Oral   SpO2: 98% 98% 98% 98%   Weight:       Height:        Body mass index is 36 25 kg/m²  I/O last 24 hours: In: 1800 [P O :800;  I V :1000]  Out: 320 [Urine:300; Drains:20]      Physical Exam: /59   Pulse 67   Temp 99 5 °F (37 5 °C) (Oral)   Resp 18   Ht 6' 3" (1 905 m)   Wt 132 kg (290 lb)   SpO2 98%   BMI 36 25 kg/m²   General appearance: alert, appears stated age, cooperative, mild distress and moderately obese  Head: Normocephalic, without obvious abnormality, atraumatic  Neck: no JVD  Lungs: clear to auscultation bilaterally  Chest wall: no tenderness  Abdomen: normal findings: bowel sounds normal and abnormal findings:  mild tenderness in the LLQ and in the lower abdomen   Has a left sided flank GREGORIO drain placed that is mildly tender to palpation but patient endorses that it is not new  Extremities: extremities normal, atraumatic, no cyanosis or edema  Pulses: 2+ and symmetric  Skin: Skin color, texture, turgor normal  No rashes or lesions  Neurologic: Grossly normal     Invasive Devices     Peripheral Intravenous Line            Peripheral IV 08/04/17 3 days          Drain            Closed/Suction Drain Left Perineal Bulb 8 5 Fr  1 day                    Labs:   Recent Results (from the past 24 hour(s))   Basic metabolic panel    Collection Time: 08/07/17  4:53 AM   Result Value Ref Range    Sodium 138 136 - 145 mmol/L    Potassium 4 2 3 5 - 5 3 mmol/L    Chloride 105 100 - 108 mmol/L    CO2 25 21 - 32 mmol/L    Anion Gap 8 4 - 13 mmol/L    BUN 7 5 - 25 mg/dL    Creatinine 0 80 0 60 - 1 30 mg/dL    Glucose 102 65 - 140 mg/dL    Calcium 8 9 8 3 - 10 1 mg/dL    eGFR 120 ml/min/1 73sq m   CBC    Collection Time: 08/07/17  4:53 AM   Result Value Ref Range    WBC 11 46 (H) 4 31 - 10 16 Thousand/uL    RBC 4 19 3 88 - 5 62 Million/uL    Hemoglobin 11 7 (L) 12 0 - 17 0 g/dL    Hematocrit 35 5 (L) 36 5 - 49 3 %    MCV 85 82 - 98 fL    MCH 27 9 26 8 - 34 3 pg    MCHC 33 0 31 4 - 37 4 g/dL    RDW 12 5 11 6 - 15 1 %    Platelets 448 (H) 221 - 390 Thousands/uL    MPV 9 7 8 9 - 12 7 fL       Radiology Results: I have personally reviewed pertinent reports  Other Diagnostic Testing:   I have personally reviewed pertinent reports        Active Meds:   Current Facility-Administered Medications   Medication Dose Route Frequency    acetaminophen (TYLENOL) tablet 650 mg  650 mg Oral Q6H PRN    albuterol (PROVENTIL HFA,VENTOLIN HFA) inhaler 2 puff  2 puff Inhalation Q6H PRN    aluminum-magnesium hydroxide-simethicone (MYLANTA) 200-200-20 mg/5 mL oral suspension 30 mL  30 mL Oral Q6H PRN    docusate sodium (COLACE) capsule 100 mg  100 mg Oral BID    HYDROmorphone (DILAUDID) 2 mg/mL injection 2 mg  2 mg Intravenous Q4H PRN    ketorolac (TORADOL) tablet 10 mg  10 mg Oral Q6H PRN    levofloxacin (LEVAQUIN) IVPB (premix) 750 mg  750 mg Intravenous Q24H    ondansetron (ZOFRAN) injection 4 mg  4 mg Intravenous Q6H PRN    oxyCODONE (ROXICODONE) IR tablet 5 mg  5 mg Oral Q6H PRN    senna (SENOKOT) tablet 8 6 mg  1 tablet Oral Daily    sodium chloride 0 9 % infusion  125 mL/hr Intravenous Continuous     Prescriptions Prior to Admission   Medication    HAWTHORN BERRIES PO    morphine (MSIR) 15 mg tablet    multivitamin (THERAGRAN) TABS    ondansetron (ZOFRAN) 4 mg tablet    [] oxyCODONE-acetaminophen (PERCOCET) 5-325 mg per tablet         VTE Pharmacologic Prophylaxis: Reason for no pharmacologic prophylaxis hematuria   VTE Mechanical Prophylaxis: sequential compression device    Anastasiia Armstrong MD  PGY-1

## 2017-08-07 NOTE — DISCHARGE INSTRUCTIONS
Please follow up with Dr Asael Anderson (urology) as outpatient for GREGORIO drain and further care  Please monitor and record drainage from GREGORIO drain every 6-8 hours or as needed depending on amount  Please complete antibiotics course  Please follow up with Primary Care Physician in the outpatient setting to establish care  TUBE CARE INSTRUCTIONS    Care after your procedure:    1  The properly functioning catheter should be forward flushed once (1x) daily with 10ml of normal saline using clean technique  You will be given a prescription for flushes  To flush the tube, clean both connections with alcohol swab  Twist off the drainage bag/ bulb  tubing and twist the saline syringe into the drainage tube and flush  Remove the syringe and twist the drainage bag / bulb tubing tubing back on     2  The drainage bag/bulb may be emptied as necessary  Keep a record of the amount of fluid you drain from your tube  This should be done with clean technique as well  3  A fresh dressing should be applied daily over the tube insertion site  4  As the tube is secured to the skin with only a suture,try not to pull on your tube  Tub baths are not permitted  Showers are permitted if the patient's skin entry site is prevented from getting wet  Similarly, washcloth "baths" are acceptable  Contact Interventional Radiology at [phone] [place] PATIENTS: Contact Interventional Radiology at [phone]) Earnest Marino PATIENTS: Contact Interventional Radiology at [phone]) if:    1  Leakage or large amounts of liquid around the catheter  2  Fever of 101 degrees lasting several hours without other obvious cause (such as sore throat, flu, etc)  3  Diminished drainage, which may be associated with pressure or pain  4  Catheter pulled back or falls out  The following pharmacies carry the flush syringes         [address]  [address] [place] [address]                         [id number] [place] [address]  Phone [phone]            Phone [phone] [facility]                                [phone]  [facility] Arvind WETZEL                      [facility]  Phone [phone]            Phone [phone]                      Sharon Salmeron                                                                                                          [phone]  [facility]  [facility] [address]    [address]  Phone [phone]

## 2017-08-07 NOTE — PROGRESS NOTES
UROLOGY PROGRESS NOTE   Patient Identifiers: Luis Shrestha (MRN [id number])  Date of Service: 8/7/2017    Subjective:     24 HR EVENTS:   no significant events  Patient has  no complaints  Objective:     VITALS:    Vitals:    08/07/17 0705   BP: 124/59   Pulse: 67   Resp: 18   Temp: 99 5 °F (37 5 °C)   SpO2: 98%       INS & OUTS:  I/O last 24 hours: In: 2160 [P O :1160;  I V :1000]  Out: 320 [Urine:300; Drains:20]    LABS:  Lab Results   Component Value Date    HGB 11 7 (L) 08/07/2017    HCT 35 5 (L) 08/07/2017    WBC 11 46 (H) 08/07/2017     (H) 08/07/2017   ]    Lab Results   Component Value Date     08/07/2017    K 4 2 08/07/2017     08/07/2017    CO2 25 08/07/2017    BUN 7 08/07/2017    CREATININE 0 80 08/07/2017    CALCIUM 8 9 08/07/2017    GLUCOSE 102 08/07/2017   ]    INPATIENT MEDS:    Current Facility-Administered Medications:     acetaminophen (TYLENOL) tablet 650 mg, 650 mg, Oral, Q6H PRN, Danial Zuniga MD    albuterol (PROVENTIL HFA,VENTOLIN HFA) inhaler 2 puff, 2 puff, Inhalation, Q6H PRN, Coy Ivan MD, 2 puff at 08/06/17 1728    aluminum-magnesium hydroxide-simethicone (MYLANTA) 200-200-20 mg/5 mL oral suspension 30 mL, 30 mL, Oral, Q6H PRN, Coy Ivan MD    docusate sodium (COLACE) capsule 100 mg, 100 mg, Oral, BID, Coy Ivan MD, 100 mg at 08/06/17 1727    HYDROmorphone (DILAUDID) 2 mg/mL injection 2 mg, 2 mg, Intravenous, Q4H PRN, Danial Zuniga MD, 2 mg at 08/06/17 0814    ketorolac (TORADOL) tablet 10 mg, 10 mg, Oral, Q6H PRN, Danial Zuniga MD    levofloxacin (LEVAQUIN) IVPB (premix) 750 mg, 750 mg, Intravenous, Q24H, Coy Ivan MD, Last Rate: 100 mL/hr at 08/06/17 1542, 750 mg at 08/06/17 1542    ondansetron (ZOFRAN) injection 4 mg, 4 mg, Intravenous, Q6H PRN, Coy Ivan MD    oxyCODONE (ROXICODONE) IR tablet 5 mg, 5 mg, Oral, Q6H PRN, Danial Zuniga MD, 5 mg at 08/06/17 2319    senna (SENOKOT) tablet 8 6 mg, 1 tablet, Oral, Daily, Coy Ivan, MD, 8 6 mg at 08/06/17 4513    sodium chloride 0 9 % infusion, 125 mL/hr, Intravenous, Continuous, Arlene Fletcher MD, Stopped at 08/07/17 0005      Physical Exam:   /59   Pulse 67   Temp 99 5 °F (37 5 °C) (Oral)   Resp 18   Ht 6' 3" (1 905 m)   Wt 132 kg (290 lb)   SpO2 98%   BMI 36 25 kg/m²   GEN: alert and oriented x 3    RESP: breathing comfortably with no accessory muscle use    ABD: soft, non-tender, non-distended   INCISION: clean, dry, intact   EXT: no significant peripheral edema   DRAINS: serosanguineous  CROWE: none        RADIOLOGY:      I have personally reviewed pertinent films in PACS    Assessment:   Principal Problem:    Sepsis secondary to UTI-perinephric abscess  Active Problems:    Asthma    Morbid obesity    Perinephric abscess    Nephrolithiasis   IR left drain placement    Plan:   -FU on culture results  -Continue drain, flush per IR  -Pain control  Symptoms will likely require several weeks to resolve  Discussed with patient and family    OK for discharge from urology standpoint  -  -  Plan was discussed with RN

## 2017-08-08 LAB
BACTERIA SPEC BFLD CULT: NO GROWTH
GRAM STN SPEC: NORMAL
GRAM STN SPEC: NORMAL

## 2017-08-10 LAB
BACTERIA BLD CULT: NORMAL
BACTERIA BLD CULT: NORMAL

## 2017-08-17 ENCOUNTER — HOSPITAL ENCOUNTER (INPATIENT)
Facility: HOSPITAL | Age: 32
LOS: 5 days | Discharge: HOME | DRG: 710 | End: 2017-08-22
Attending: EMERGENCY MEDICINE | Admitting: INTERNAL MEDICINE
Payer: COMMERCIAL

## 2017-08-17 ENCOUNTER — APPOINTMENT (INPATIENT)
Dept: RADIOLOGY | Facility: HOSPITAL | Age: 32
DRG: 710 | End: 2017-08-17
Payer: COMMERCIAL

## 2017-08-17 ENCOUNTER — GENERIC CONVERSION - ENCOUNTER (OUTPATIENT)
Dept: OTHER | Facility: OTHER | Age: 32
End: 2017-08-17

## 2017-08-17 ENCOUNTER — APPOINTMENT (EMERGENCY)
Dept: RADIOLOGY | Facility: HOSPITAL | Age: 32
DRG: 710 | End: 2017-08-17
Payer: COMMERCIAL

## 2017-08-17 DIAGNOSIS — IMO0001: ICD-10-CM

## 2017-08-17 DIAGNOSIS — N39.0 SEPSIS SECONDARY TO UTI (HCC): ICD-10-CM

## 2017-08-17 DIAGNOSIS — A41.9 SEVERE SEPSIS (HCC): ICD-10-CM

## 2017-08-17 DIAGNOSIS — N20.0 NEPHROLITHIASIS: Chronic | ICD-10-CM

## 2017-08-17 DIAGNOSIS — D72.829 LEUKOCYTOSIS: ICD-10-CM

## 2017-08-17 DIAGNOSIS — N15.1 PERINEPHRIC ABSCESS: Primary | ICD-10-CM

## 2017-08-17 DIAGNOSIS — R65.20 SEVERE SEPSIS (HCC): ICD-10-CM

## 2017-08-17 DIAGNOSIS — A41.9 SEPSIS SECONDARY TO UTI (HCC): ICD-10-CM

## 2017-08-17 LAB
ALBUMIN SERPL BCP-MCNC: 3.5 G/DL (ref 3.5–5)
ALP SERPL-CCNC: 65 U/L (ref 46–116)
ALT SERPL W P-5'-P-CCNC: 51 U/L (ref 12–78)
ANION GAP SERPL CALCULATED.3IONS-SCNC: 9 MMOL/L (ref 4–13)
APTT PPP: 29 SECONDS (ref 23–35)
AST SERPL W P-5'-P-CCNC: 31 U/L (ref 5–45)
ATRIAL RATE: 93 BPM
BACTERIA UR QL AUTO: NORMAL /HPF
BASOPHILS # BLD AUTO: 0.05 THOUSANDS/ΜL (ref 0–0.1)
BASOPHILS NFR BLD AUTO: 0 % (ref 0–1)
BILIRUB SERPL-MCNC: 0.65 MG/DL (ref 0.2–1)
BILIRUB UR QL STRIP: NEGATIVE
BUN SERPL-MCNC: 13 MG/DL (ref 5–25)
CALCIUM SERPL-MCNC: 9.4 MG/DL (ref 8.3–10.1)
CHLORIDE SERPL-SCNC: 101 MMOL/L (ref 100–108)
CLARITY UR: CLEAR
CO2 SERPL-SCNC: 25 MMOL/L (ref 21–32)
COLOR UR: YELLOW
COLOR, POC: NORMAL
CREAT SERPL-MCNC: 0.95 MG/DL (ref 0.6–1.3)
EOSINOPHIL # BLD AUTO: 0.13 THOUSAND/ΜL (ref 0–0.61)
EOSINOPHIL NFR BLD AUTO: 1 % (ref 0–6)
ERYTHROCYTE [DISTWIDTH] IN BLOOD BY AUTOMATED COUNT: 12.5 % (ref 11.6–15.1)
GFR SERPL CREATININE-BSD FRML MDRD: 107 ML/MIN/1.73SQ M
GLUCOSE SERPL-MCNC: 114 MG/DL (ref 65–140)
GLUCOSE UR STRIP-MCNC: NEGATIVE MG/DL
HCT VFR BLD AUTO: 39.6 % (ref 36.5–49.3)
HGB BLD-MCNC: 13.6 G/DL (ref 12–17)
HGB UR QL STRIP.AUTO: ABNORMAL
HYALINE CASTS #/AREA URNS LPF: NORMAL /LPF
INR PPP: 1.03 (ref 0.86–1.16)
KETONES UR STRIP-MCNC: NEGATIVE MG/DL
LACTATE SERPL-SCNC: 1.2 MMOL/L (ref 0.5–2)
LACTATE SERPL-SCNC: 1.8 MMOL/L (ref 0.5–2)
LACTATE SERPL-SCNC: 2.3 MMOL/L (ref 0.5–2)
LEUKOCYTE ESTERASE UR QL STRIP: NEGATIVE
LYMPHOCYTES # BLD AUTO: 1.61 THOUSANDS/ΜL (ref 0.6–4.47)
LYMPHOCYTES NFR BLD AUTO: 7 % (ref 14–44)
MCH RBC QN AUTO: 28.3 PG (ref 26.8–34.3)
MCHC RBC AUTO-ENTMCNC: 34.3 G/DL (ref 31.4–37.4)
MCV RBC AUTO: 82 FL (ref 82–98)
MONOCYTES # BLD AUTO: 1.69 THOUSAND/ΜL (ref 0.17–1.22)
MONOCYTES NFR BLD AUTO: 7 % (ref 4–12)
NEUTROPHILS # BLD AUTO: 19.52 THOUSANDS/ΜL (ref 1.85–7.62)
NEUTS SEG NFR BLD AUTO: 85 % (ref 43–75)
NITRITE UR QL STRIP: NEGATIVE
NON-SQ EPI CELLS URNS QL MICRO: NORMAL /HPF
NRBC BLD AUTO-RTO: 0 /100 WBCS
P AXIS: 62 DEGREES
PH UR STRIP.AUTO: 6.5 [PH] (ref 4.5–8)
PLATELET # BLD AUTO: 475 THOUSANDS/UL (ref 149–390)
PMV BLD AUTO: 9.4 FL (ref 8.9–12.7)
POTASSIUM SERPL-SCNC: 4.4 MMOL/L (ref 3.5–5.3)
PR INTERVAL: 166 MS
PROT SERPL-MCNC: 8.5 G/DL (ref 6.4–8.2)
PROT UR STRIP-MCNC: NEGATIVE MG/DL
PROTHROMBIN TIME: 13.5 SECONDS (ref 12.1–14.4)
QRS AXIS: 46 DEGREES
QRSD INTERVAL: 70 MS
QT INTERVAL: 320 MS
QTC INTERVAL: 397 MS
RBC # BLD AUTO: 4.81 MILLION/UL (ref 3.88–5.62)
RBC #/AREA URNS AUTO: NORMAL /HPF
SODIUM SERPL-SCNC: 135 MMOL/L (ref 136–145)
SP GR UR STRIP.AUTO: <=1.005 (ref 1–1.03)
SPECIMEN SOURCE: NORMAL
T WAVE AXIS: 58 DEGREES
TROPONIN I BLD-MCNC: 0 NG/ML (ref 0–0.08)
UROBILINOGEN UR QL STRIP.AUTO: 0.2 E.U./DL
VENTRICULAR RATE: 93 BPM
WBC # BLD AUTO: 23.08 THOUSAND/UL (ref 4.31–10.16)
WBC #/AREA URNS AUTO: NORMAL /HPF

## 2017-08-17 PROCEDURE — 83605 ASSAY OF LACTIC ACID: CPT | Performed by: INTERNAL MEDICINE

## 2017-08-17 PROCEDURE — 0T9130Z DRAINAGE OF LEFT KIDNEY WITH DRAINAGE DEVICE, PERCUTANEOUS APPROACH: ICD-10-PCS | Performed by: RADIOLOGY

## 2017-08-17 PROCEDURE — BT121ZZ FLUOROSCOPY OF LEFT KIDNEY USING LOW OSMOLAR CONTRAST: ICD-10-PCS | Performed by: RADIOLOGY

## 2017-08-17 PROCEDURE — 93005 ELECTROCARDIOGRAM TRACING: CPT | Performed by: EMERGENCY MEDICINE

## 2017-08-17 PROCEDURE — 99285 EMERGENCY DEPT VISIT HI MDM: CPT

## 2017-08-17 PROCEDURE — 85610 PROTHROMBIN TIME: CPT | Performed by: EMERGENCY MEDICINE

## 2017-08-17 PROCEDURE — 81001 URINALYSIS AUTO W/SCOPE: CPT

## 2017-08-17 PROCEDURE — 49423 EXCHANGE DRAINAGE CATHETER: CPT

## 2017-08-17 PROCEDURE — 96361 HYDRATE IV INFUSION ADD-ON: CPT

## 2017-08-17 PROCEDURE — 81002 URINALYSIS NONAUTO W/O SCOPE: CPT | Performed by: EMERGENCY MEDICINE

## 2017-08-17 PROCEDURE — 36415 COLL VENOUS BLD VENIPUNCTURE: CPT | Performed by: EMERGENCY MEDICINE

## 2017-08-17 PROCEDURE — 96375 TX/PRO/DX INJ NEW DRUG ADDON: CPT

## 2017-08-17 PROCEDURE — 80053 COMPREHEN METABOLIC PANEL: CPT | Performed by: EMERGENCY MEDICINE

## 2017-08-17 PROCEDURE — 96374 THER/PROPH/DIAG INJ IV PUSH: CPT

## 2017-08-17 PROCEDURE — C1769 GUIDE WIRE: HCPCS

## 2017-08-17 PROCEDURE — C1729 CATH, DRAINAGE: HCPCS

## 2017-08-17 PROCEDURE — 87040 BLOOD CULTURE FOR BACTERIA: CPT | Performed by: EMERGENCY MEDICINE

## 2017-08-17 PROCEDURE — 0TP530Z REMOVAL OF DRAINAGE DEVICE FROM KIDNEY, PERCUTANEOUS APPROACH: ICD-10-PCS | Performed by: RADIOLOGY

## 2017-08-17 PROCEDURE — 84484 ASSAY OF TROPONIN QUANT: CPT

## 2017-08-17 PROCEDURE — 74177 CT ABD & PELVIS W/CONTRAST: CPT

## 2017-08-17 PROCEDURE — 85025 COMPLETE CBC W/AUTO DIFF WBC: CPT | Performed by: EMERGENCY MEDICINE

## 2017-08-17 PROCEDURE — 71275 CT ANGIOGRAPHY CHEST: CPT

## 2017-08-17 PROCEDURE — 85730 THROMBOPLASTIN TIME PARTIAL: CPT | Performed by: EMERGENCY MEDICINE

## 2017-08-17 PROCEDURE — 75984 XRAY CONTROL CATHETER CHANGE: CPT

## 2017-08-17 PROCEDURE — 83605 ASSAY OF LACTIC ACID: CPT | Performed by: EMERGENCY MEDICINE

## 2017-08-17 RX ORDER — ONDANSETRON 2 MG/ML
4 INJECTION INTRAMUSCULAR; INTRAVENOUS ONCE
Status: COMPLETED | OUTPATIENT
Start: 2017-08-17 | End: 2017-08-17

## 2017-08-17 RX ORDER — VANCOMYCIN HYDROCHLORIDE 1 G/200ML
1000 INJECTION, SOLUTION INTRAVENOUS ONCE
Status: COMPLETED | OUTPATIENT
Start: 2017-08-17 | End: 2017-08-17

## 2017-08-17 RX ORDER — SODIUM CHLORIDE 9 MG/ML
125 INJECTION, SOLUTION INTRAVENOUS CONTINUOUS
Status: DISCONTINUED | OUTPATIENT
Start: 2017-08-17 | End: 2017-08-19

## 2017-08-17 RX ORDER — HYDROMORPHONE HCL 110MG/55ML
2 PATIENT CONTROLLED ANALGESIA SYRINGE INTRAVENOUS EVERY 4 HOURS PRN
Status: DISCONTINUED | OUTPATIENT
Start: 2017-08-17 | End: 2017-08-18

## 2017-08-17 RX ORDER — ACETAMINOPHEN 325 MG/1
650 TABLET ORAL EVERY 6 HOURS PRN
Status: DISCONTINUED | OUTPATIENT
Start: 2017-08-17 | End: 2017-08-18

## 2017-08-17 RX ORDER — ALBUTEROL SULFATE 90 UG/1
2 AEROSOL, METERED RESPIRATORY (INHALATION) EVERY 4 HOURS PRN
Status: DISCONTINUED | OUTPATIENT
Start: 2017-08-17 | End: 2017-08-22 | Stop reason: HOSPADM

## 2017-08-17 RX ORDER — ONDANSETRON 2 MG/ML
4 INJECTION INTRAMUSCULAR; INTRAVENOUS EVERY 6 HOURS PRN
Status: DISCONTINUED | OUTPATIENT
Start: 2017-08-17 | End: 2017-08-22 | Stop reason: HOSPADM

## 2017-08-17 RX ADMIN — HYDROMORPHONE HYDROCHLORIDE 2 MG: 2 INJECTION, SOLUTION INTRAMUSCULAR; INTRAVENOUS; SUBCUTANEOUS at 19:44

## 2017-08-17 RX ADMIN — HYDROMORPHONE HYDROCHLORIDE 0.5 MG: 1 INJECTION, SOLUTION INTRAMUSCULAR; INTRAVENOUS; SUBCUTANEOUS at 12:01

## 2017-08-17 RX ADMIN — ONDANSETRON 4 MG: 2 INJECTION INTRAMUSCULAR; INTRAVENOUS at 12:01

## 2017-08-17 RX ADMIN — SODIUM CHLORIDE 1000 ML: 0.9 INJECTION, SOLUTION INTRAVENOUS at 11:59

## 2017-08-17 RX ADMIN — VANCOMYCIN HYDROCHLORIDE 1000 MG: 1 INJECTION, SOLUTION INTRAVENOUS at 14:59

## 2017-08-17 RX ADMIN — IOHEXOL 100 ML: 350 INJECTION, SOLUTION INTRAVENOUS at 13:04

## 2017-08-17 RX ADMIN — SODIUM CHLORIDE 125 ML/HR: 0.9 INJECTION, SOLUTION INTRAVENOUS at 18:47

## 2017-08-17 RX ADMIN — SODIUM CHLORIDE 500 ML: 0.9 INJECTION, SOLUTION INTRAVENOUS at 16:12

## 2017-08-17 RX ADMIN — ACETAMINOPHEN 650 MG: 325 TABLET, FILM COATED ORAL at 23:06

## 2017-08-17 RX ADMIN — IOHEXOL 4 ML: 300 INJECTION, SOLUTION INTRAVENOUS at 17:52

## 2017-08-17 RX ADMIN — SODIUM CHLORIDE 125 ML/HR: 0.9 INJECTION, SOLUTION INTRAVENOUS at 18:05

## 2017-08-17 RX ADMIN — CEFEPIME 2000 MG: 2 INJECTION, POWDER, FOR SOLUTION INTRAMUSCULAR; INTRAVENOUS at 16:13

## 2017-08-17 NOTE — PROCEDURES
Procedures  Interventional Radiology Procedure Note    PATIENT NAME: Artemio Treviño  : 1985  MRN: [id number]    Procedure: Tube check and repositioned    Estimated Blood Loss: Minimal     Findings: Very small residual left perinephric hematoma  The catheter was repositioned into a small residual pocket that was identified with contrast injection  Output is serosanguineous with no purulence  Unlikely source of fever  Very little to no output should be expected from this drain  Outpatient appointment will be scheduled in one week for tube check and possible removal  He was instructed to stop catheter flushing      Specimens: None    Complications:  None    Anesthesia: Local    Aundria Osgood, MD     Date: 2017  Time: 5:22 PM

## 2017-08-17 NOTE — H&P
INTERNAL MEDICINE HISTORY AND PHYSICAL  ED 15 SOD Team B     NAME: Vivian Driscoll  AGE: 29 y o  SEX: male  : 1985   MRN: [id number]  ENCOUNTER: [id number]    DATE: 2017  TIME: 4:01 PM    Primary Care Physician: No primary care provider on file  Admitting Provider: Good Morales MD    Chief complaint: Fever/L  flank pain    History of Present Illness     Vivian Driscoll is a 29 y o  obese male with history of asthma, nephrolithiasis who presents to the ER with severe right flank pain, fevers and found to have severe sepsis  He was recently admitted to the hospital from  to 17 with similar symptoms, found to have a L-sided perinephric abscess and kidney stone, which was drained by IR and GREGORIO drain was placed  Urology was following as well, he was transitioned from IV levaquin to oral levaquin due to his penicillin allergy, which he has 2 days left to complete  His mother states since he got home, the drain was draining dark red blood and eventually stopped draining at all  He started to get severe flank pain and fevers 2 days ago  He denies urinary symptoms at this time  Previous to his last admission, he was treated with cipro for UTI and hydronephrosis after he had a lithotripsy with stent placement on 17, soon after developing Sepsis when the stent was pulled on   In ER, Lactic is 2 3, WBC 23, blood cx pending, he has a low grade fever and tachycardia  He is hemodynamically stable  CT chest negative for PE or active pulmonary infection  CT abd/pelvis shows persistent bilateral non-obstructing nephrolithiasis with left abscess, the same size as it was last admission (no resolution)  UA is negative      Review of Systems   Review of Systems  +Severe right flank pain, fevers, chills, +nausea  Denies SOB, HA, dizziness, vomiting, diarrhea/constipation, leg swelling    Past Medical History     Past Medical History:   Diagnosis Date    Asthma     Hypertension     Nephrolithiasis        Past Surgical History     Past Surgical History:   Procedure Laterality Date    DENTAL SURGERY      HI CYSTO/URETERO W/LITHOTRIPSY &INDWELL STENT INSRT Left 7/26/2017    Procedure: CYSTOSCOPY URETEROSCOPY WITH LITHOTRIPSY HOLMIUM LASER, RETROGRADE PYELOGRAM AND INSERTION STENT URETERAL;  Surgeon: Chepe Rolle MD;  Location: [facility];  Service: Urology       Social History     History   Alcohol Use    Yes     Comment: OCCASIONALLY     History   Drug Use No     History   Smoking Status    Never Smoker   Smokeless Tobacco    Never Used       Family History     Family History   Problem Relation Age of Onset    Diabetes Father     Hypertension Father     Diabetes Maternal Grandfather        Medications Prior to Admission     Prior to Admission medications    Medication Sig Start Date End Date Taking? Authorizing Provider   RONALD RAE PO Take 60 mL by mouth daily   Yes Historical Provider, MD   levofloxacin (LEVAQUIN) 750 mg tablet Take 1 tablet by mouth every 24 hours for 11 days 8/7/17 8/18/17 Yes Adam Nina MD   ketorolac (TORADOL) 10 mg tablet Take 1 tablet by mouth every 6 (six) hours as needed for moderate pain for up to 5 days 8/7/17 8/17/17  Adam Nina MD   multivitamin [facility]) TABS Take 1 tablet by mouth daily  8/17/17  Historical Provider, MD       Allergies     Allergies   Allergen Reactions    Amoxicillin Hives    Penicillins Hives       Objective     Vitals:    08/17/17 1206 08/17/17 1328 08/17/17 1500 08/17/17 1600   BP: 127/55 128/59 107/56 125/56   Pulse: 98 100 92 93   Resp: 18 18 18 18   Temp:       TempSrc:       SpO2: 98% 97% 98% 98%     There is no height or weight on file to calculate BMI  Intake/Output Summary (Last 24 hours) at 08/17/17 1601  Last data filed at 08/17/17 1300   Gross per 24 hour   Intake             1000 ml   Output               50 ml   Net              950 ml     Physical Exam  GENERAL: AAOx3, in severe pain   HEENT: Normocephalic and atraumatic  No scleral icterus  PERRLA  EOMI B/L  No oropharyngeal edema  MM moist    NECK: Neck supple with no lymphadenopathy  Trachea midline  No JVD  CARDIOVASCULAR: S1 and S2 are present  Regular rate and rhythm  No murmurs appreciated   RESPIRATORY: CTA B/L, no rales, rhonci or wheezes  ABDOMINAL: +left CVA tenderness, pus draining out of GREGORIO drain insertion site on left   EXTREMITIES: 2+ DP and PT pulses bilaterally; no cyanosis, clubbing, edema  NEUROLOGIC: Patient is alert and oriented to person, place, and time  No sensory or motor deficits  CN 2-12 intact  Plantars downgoing bilaterally  Speech fluent  SKIN: Skin is warm and dry  No skin lesions are present  No rashes  PSYCHIATRIC: Normal mood and affect     Lab Results: I have personally reviewed pertinent reports      CBC:   Results from last 7 days  Lab Units 08/17/17  1208   WBC Thousand/uL 23 08*   RBC Million/uL 4 81   HEMOGLOBIN g/dL 13 6   HEMATOCRIT % 39 6   MCV fL 82   MCH pg 28 3   MCHC g/dL 34 3   RDW % 12 5   MPV fL 9 4   PLATELETS Thousands/uL 475*   NRBC AUTO /100 WBCs 0   NEUTROS PCT % 85*   LYMPHS PCT % 7*   MONOS PCT % 7   EOS PCT % 1   BASOS PCT % 0   NEUTROS ABS Thousands/µL 19 52*   LYMPHS ABS Thousands/µL 1 61   MONOS ABS Thousand/µL 1 69*   EOS ABS Thousand/µL 0 13      Results from last 7 days  Lab Units 08/17/17  1208   SODIUM mmol/L 135*   POTASSIUM mmol/L 4 4   CHLORIDE mmol/L 101   CO2 mmol/L 25   ANION GAP mmol/L 9   BUN mg/dL 13   CREATININE mg/dL 0 95   GLUCOSE RANDOM mg/dL 114   CALCIUM mg/dL 9 4   AST U/L 31   ALT U/L 51   ALK PHOS U/L 65   TOTAL PROTEIN g/dL 8 5*   ALBUMIN g/dL 3 5   BILIRUBIN TOTAL mg/dL 0 65   EGFR ml/min/1 73sq m 107   , Coagulation Studies:   Results from last 7 days  Lab Units 08/17/17  1208   PROTIME seconds 13 5   INR  1 03   PTT seconds 29   , Cardiac Studies:   Results from last 7 days  Lab Units 08/17/17  1215   POC TROPONIN I  ng/ml 0 00   , Additional Labs:   Results from last 7 days  Lab Units 08/17/17  1208   LACTIC ACID mmol/L 2 3*   , iSTAT CHEM 8:   Results from last 7 days  Lab Units 08/17/17  1208   EGFR ml/min/1 73sq m 107   GLUCOSE RANDOM mg/dL 114   HEMOGLOBIN g/dL 13 6       Imaging: I have personally reviewed pertinent films in PACS    Cta Chest Ct Abdomen Pelvis W Contrast  8/17/2017  Impression: 1  Suboptimal evaluation of the pulmonary arteries for reasons discussed above  No evidence of central pulmonary embolus  2   Drainage catheter within left perinephric subcapsular collection  3   Small bilateral nonobstructing renal calculi  4   Hepatomegaly and hepatic steatosis  5   No evidence of acute abnormality in the chest, abdomen or pelvis             Microbiology: cultures obtained in emergency department include:    Urinalysis:   Results from last 7 days  Lab Units 08/17/17  1401 08/17/17  1331   COLOR UA  see chart results Yellow   CLARITY UA   --  Clear   SPEC GRAV UA   --  <=1 005   PH UA   --  6 5   LEUKOCYTES UA   --  Negative   NITRITE UA   --  Negative   PROTEIN UA mg/dl  --  Negative   GLUCOSE UA mg/dl  --  Negative   KETONES UA mg/dl  --  Negative   BILIRUBIN UA   --  Negative   BLOOD UA   --  Trace*        Urine Micro:   Results from last 7 days  Lab Units 08/17/17  1331   RBC UA /hpf None Seen   WBC UA /hpf None Seen   EPITHELIAL CELLS WET PREP /hpf None Seen   BACTERIA UA /hpf None Seen        EKG: NSR, no ischemia    Medications given in Emergency Department     Medication Administration - last 24 hours from 08/16/2017 1601 to 08/17/2017 1601       Date/Time Order Dose Route Action Action by     08/17/2017 1300 sodium chloride 0 9 % bolus 1,000 mL 0 mL Intravenous Stopped Magaly Cui RN     08/17/2017 1159 sodium chloride 0 9 % bolus 1,000 mL 1,000 mL Intravenous New Bag Levi Irizarry RN     08/17/2017 1201 ondansetron (ZOFRAN) injection 4 mg 4 mg Intravenous Given Levi Irizarry RN     08/17/2017 1201 HYDROmorphone (DILAUDID) 1 mg/mL injection 0 5 mg 0 5 mg Intravenous Given Terry Johns RN     08/17/2017 1304 iohexol (OMNIPAQUE) 350 MG/ML injection (MULTI-DOSE) 100 mL 100 mL Intravenous Given Gregory Johnson     08/17/2017 1559 vancomycin (VANCOCIN) IVPB (premix) 1,000 mg 0 mg Intravenous Stopped Terry Johns RN     08/17/2017 1459 vancomycin (VANCOCIN) IVPB (premix) 1,000 mg 1,000 mg Intravenous New Bag Terry Johns RN          Assessment and Plan     #Severe Sepsis  -Evidenced by leukocytosis, fever of 100 2, tachycardia, lactate of 2 3 with abdominal source  -2/2 left perinephric abscess/GREGORIO drain  -Hemodynamically stable  -Continue IV Cefepime - was on levaquin previously and allergic to penicillins   -UA negative  -Blood cx x2 pending   -Repeat lactic q2h until normalizes  -IR consultation as suspect GREGORIO drain is blocked/infected  -Urology called/made aware, consulted and are following  -Keep NPO for now until IR procedure, will start regular diet following procedure, continue NSS 125ml/hr   -Start Dilaudid 2mg IV Q4H PRN for pain, Zofran 4mg IV Q6H PRN for nausea  -Tylenol for fevers     #Left Perinephric abscess  -See above     #Asthma  -uses only herbal supplements at home, Albuterol PRN while in hospital  Needs outpatient follow-up     #Morbid Obesity  -BMI 36, will provide dietary and lifestyle modifications - hepatic steatosis likely 2/2 this       Code Status: Level 1 - Full Code  VTE Pharmacologic Prophylaxis: Reason for no pharmacologic prophylaxis possible IR procedure   VTE Mechanical Prophylaxis: sequential compression device  Admission Status: INPATIENT     Admission Time  I spent 45 minutes admitting the patient  This involved direct patient contact where I performed a full history and physical, reviewing previous records, and reviewing laboratory and other diagnostic studies      Boris Cortes,   Internal Medicine  PGY-2

## 2017-08-17 NOTE — ED PROVIDER NOTES
History  Chief Complaint   Patient presents with    Shortness of Breath     Patient released from hospital on the 7th with GREGORIO drain, today patient woke up with fever of 104, feeling heaviness in chest and feeling SOB  Emergency Department Note- Zaira Domingo 29 y o  male MRN: [id number]    Unit/Bed#: ED 15 Encounter: [id number]        History of Present Illness  HPI:  Zaira Domingo is a 29 y o  male who presents with fever, cp and shortness of breath since last night  Pt was recently admitted to the hospital for macario-nephric abscess and sepsis and dc'd with GREGORIO drain and PO abx  Last night pt developed L flank pain with associated CP and SOB similar to previous episode when he was admitted  CP described as pressure like, non-raditing  SOB with inspiration  + nonproductive, occasional cough  + nausea, no vomiting  + fever to 100 4  No urinary sxms  Pt also notes decreased drainage from GREGORIO drain over the past day       REVIEW OF SYSTEMS  Constitutional:  Positive fever and chills   Eyes:  Denies change in visual acuity   HENT:  Denies nasal congestion or sore throat   Respiratory:  Positive nonproductive cough or positive shortness of breath   Cardiovascular:  Positive chest pain, no edema   GI:  Positive abdominal pain, nausea, no vomiting, no change in stools  :  Denies dysuria   Musculoskeletal:  Positive back pain or joint pain   Integument:  Denies rash   Neurologic:  Denies headache, focal weakness or sensory changes   Endocrine:  Denies polyuria or polydipsia   Lymphatic:  Denies swollen glands   Psychiatric:  Denies depression or anxiety     Historical Information  Past Medical History:  No date: Asthma  No date: Hypertension  No date: Nephrolithiasis  Past Surgical History:  No date: DENTAL SURGERY  7/26/2017: [place] CYSTO/URETERO W/LITHOTRIPSY &INDWELL STENT * Left      Comment: Procedure: CYSTOSCOPY URETEROSCOPY WITH                LITHOTRIPSY HOLMIUM LASER, RETROGRADE PYELOGRAM               AND INSERTION STENT URETERAL;  Surgeon: Michell Cervantes MD;  Location: [facility];  Service:                Urology  Social History  Alcohol use: Yes              Comment: OCCASIONALLY    Drug use: No              Smoking status: Never Smoker                                                              Smokeless tobacco: Never Used                        Family History: Review of patient's family history indicates:    Diabetes                       Father                    Hypertension                   Father                    Diabetes                       Maternal Grandfather        Meds/Allergies  (Not in a hospital admission)     -- Amoxicillin -- Hives   -- Penicillins -- Hives    Objective  Vitals: Pulse (!) 120, temperature 99 7 °F (37 6 °C), temperature source Oral, resp  rate 20, SpO2 96 %  PHYSICAL EXAM  Constitutional:  Well developed, well nourished, no acute distress, non-toxic appearance but uncomfortable appearing  Eyes:  PERRL, conjunctiva normal   HENT:  Atraumatic, external ears normal, nose normal, oropharynx moist, no pharyngeal exudates  Neck- normal range of motion, no tenderness, supple   Respiratory:  No respiratory distress, normal breath sounds, no rales, no wheezing   Cardiovascular:  Normal rate, normal rhythm, no murmurs, no gallops, no rubs   GI:  Soft, nondistended, normal bowel sounds, positive tender to palpation over L mid abdomen, +CVA ttp and lower abdominal ttp, no organomegaly, no mass, no rebound, no guarding, GREGORIO site C/D/I  :  Positive costovertebral angle tenderness   Musculoskeletal:  No edema, no tenderness, no deformities   Back- no tenderness  Integument:  Well hydrated, no rash   Lymphatic:  No lymphadenopathy noted   Neurologic:  Alert & oriented x 3, CN 2-12 normal, normal motor function, normal sensory function, no focal deficits noted   Psychiatric:  Speech and behavior appropriate     Lab Results: Lab Results: I have personally reviewed pertinent lab results  Imaging: I have personally reviewed pertinent reports  EKG, Pathology, and Other Studies: I have personally reviewed pertinent films in PACS      Assessment/Plan    ED Medical Decision Making: This is a 53-year-old male with history of nephrolithiasis and recent admission for perinephric abscess and sepsis who was discharged with a GREGORIO drain and oral antibiotics he returns the emergency department with fevers abdominal chest pain and shortness of breath  Will eval for reexploration of abscess, will also  evalve for possible infectious etiology of complaints including pneumonia  Cannot rule out PE at this time given recent hospitalization with chest pain and shortness of breath  Will obtain CTA chest as well as abdomen and pelvis  Will obtain blood work  We'll give IV fluid hydration  Will give antibiotics and pain control  Prior to Admission Medications   Prescriptions Last Dose Informant Patient Reported? Taking? HAWTHORN BERRIES PO 8/16/2017  Yes Yes   Sig: Take 60 mL by mouth daily   levofloxacin (LEVAQUIN) 750 mg tablet 8/16/2017  No Yes   Sig: Take 1 tablet by mouth every 24 hours for 11 days      Facility-Administered Medications: None       Past Medical History:   Diagnosis Date    Asthma     Hypertension     Nephrolithiasis        Past Surgical History:   Procedure Laterality Date    DENTAL SURGERY      [place] CYSTO/URETERO W/LITHOTRIPSY &INDWELL STENT INSRT Left 7/26/2017    Procedure: CYSTOSCOPY URETEROSCOPY WITH LITHOTRIPSY HOLMIUM LASER, RETROGRADE PYELOGRAM AND INSERTION STENT URETERAL;  Surgeon: Feliciano Jacob MD;  [facility];  Service: Urology       Family History   Problem Relation Age of Onset    Diabetes Father     Hypertension Father     Diabetes Maternal Grandfather      I have reviewed and agree with the history as documented      Social History   Substance Use Topics    Smoking status: Never Smoker    Smokeless tobacco: Never Used    Alcohol use Yes      Comment: OCCASIONALLY        Review of Systems    Physical Exam  ED Triage Vitals   Temperature Pulse Respirations Blood Pressure SpO2   08/17/17 1027 08/17/17 1027 08/17/17 1027 08/17/17 1206 08/17/17 1027   99 7 °F (37 6 °C) (!) 120 20 127/55 96 %      Temp Source Heart Rate Source Patient Position BP Location FiO2 (%)   08/17/17 1027 08/17/17 1027 08/17/17 1027 08/17/17 1027 --   Oral Monitor Sitting Right arm       Pain Score       08/17/17 1027       8           Physical Exam    ED Medications  Medications   albuterol (PROVENTIL HFA,VENTOLIN HFA) inhaler 2 puff (not administered)   sodium chloride 0 9 % infusion (125 mL/hr Intravenous New Bag 8/18/17 1241)   cefepime (MAXIPIME) 2 g/50 mL dextrose IVPB (2,000 mg Intravenous New Bag 8/18/17 0348)   ondansetron (ZOFRAN) injection 4 mg (not administered)   potassium chloride (K-DUR,KLOR-CON) CR tablet 20 mEq (not administered)   aluminum-magnesium hydroxide-simethicone (MYLANTA) 200-200-20 mg/5 mL oral suspension 30 mL (not administered)   ibuprofen (MOTRIN) tablet 400 mg (not administered)   acetaminophen (TYLENOL) tablet 650 mg (not administered)   sodium chloride 0 9 % bolus 1,000 mL (0 mL Intravenous Stopped 8/17/17 1300)   ondansetron (ZOFRAN) injection 4 mg (4 mg Intravenous Given 8/17/17 1201)   HYDROmorphone (DILAUDID) 1 mg/mL injection 0 5 mg (0 5 mg Intravenous Given 8/17/17 1201)   iohexol (OMNIPAQUE) 350 MG/ML injection (MULTI-DOSE) 100 mL (100 mL Intravenous Given 8/17/17 1304)   vancomycin (VANCOCIN) IVPB (premix) 1,000 mg (0 mg Intravenous Stopped 8/17/17 1559)   sodium chloride 0 9 % bolus 500 mL (0 mL Intravenous Stopped 8/17/17 1636)   iohexol (OMNIPAQUE) 300 mg/mL injection 50 mL (4 mL Intravenous Given 8/17/17 1752)       Diagnostic Studies  Labs Reviewed   CBC AND DIFFERENTIAL - Abnormal        Result Value Ref Range Status    WBC 23 08 (*) 4 31 - 10 16 Thousand/uL Final    Platelets 670 (*) 078 - 390 Thousands/uL Final    Neutrophils Relative 85 (*) 43 - 75 % Final    Lymphocytes Relative 7 (*) 14 - 44 % Final    Neutrophils Absolute 19 52 (*) 1 85 - 7 62 Thousands/µL Final    Monocytes Absolute 1 69 (*) 0 17 - 1 22 Thousand/µL Final    RBC 4 81  3 88 - 5 62 Million/uL Final    Hemoglobin 13 6  12 0 - 17 0 g/dL Final    Hematocrit 39 6  36 5 - 49 3 % Final    MCV 82  82 - 98 fL Final    MCH 28 3  26 8 - 34 3 pg Final    MCHC 34 3  31 4 - 37 4 g/dL Final    RDW 12 5  11 6 - 15 1 % Final    MPV 9 4  8 9 - 12 7 fL Final    nRBC 0  /100 WBCs Final    Monocytes Relative 7  4 - 12 % Final    Eosinophils Relative 1  0 - 6 % Final    Basophils Relative 0  0 - 1 % Final    Lymphocytes Absolute 1 61  0 60 - 4 47 Thousands/µL Final    Eosinophils Absolute 0 13  0 00 - 0 61 Thousand/µL Final    Basophils Absolute 0 05  0 00 - 0 10 Thousands/µL Final   COMPREHENSIVE METABOLIC PANEL - Abnormal     Sodium 135 (*) 136 - 145 mmol/L Final    Total Protein 8 5 (*) 6 4 - 8 2 g/dL Final    Potassium 4 4  3 5 - 5 3 mmol/L Final    Chloride 101  100 - 108 mmol/L Final    CO2 25  21 - 32 mmol/L Final    Anion Gap 9  4 - 13 mmol/L Final    BUN 13  5 - 25 mg/dL Final    Creatinine 0 95  0 60 - 1 30 mg/dL Final    Comment: Standardized to IDMS reference method    Glucose 114  65 - 140 mg/dL Final    Comment:   If the patient is fasting, the ADA then defines impaired fasting glucose as > 100 mg/dL and diabetes as > or equal to 123 mg/dL  Specimen collection should occur prior to Sulfasalazine administration due to the potential for falsely depressed results  Specimen collection should occur prior to Sulfapyridine administration due to the potential for falsely elevated results  Calcium 9 4  8 3 - 10 1 mg/dL Final    AST 31  5 - 45 U/L Final    Comment:   Specimen collection should occur prior to Sulfasalazine administration due to the potential for falsely depressed results       ALT 51  12 - 78 U/L Final    Comment:   Specimen collection should occur prior to Sulfasalazine and/or Sulfapyridine administration due to the potential for falsely depressed results  Alkaline Phosphatase 65  46 - 116 U/L Final    Albumin 3 5  3 5 - 5 0 g/dL Final    Total Bilirubin 0 65  0 20 - 1 00 mg/dL Final    eGFR 107  ml/min/1 73sq m Final    Narrative:     National Kidney Disease Education Program recommendations are as follows:  GFR calculation is accurate only with a steady state creatinine  Chronic Kidney disease less than 60 ml/min/1 73 sq  meters  Kidney failure less than 15 ml/min/1 73 sq  meters  LACTIC ACID, PLASMA - Abnormal     LACTIC ACID 2 3 (*) 0 5 - 2 0 mmol/L Final    Narrative:     Result may be elevated if tourniquet was used during collection  ED URINE MACROSCOPIC - Abnormal     Blood, UA Trace (*) Negative Final    Color, UA Yellow   Final    Clarity, UA Clear   Final    pH, UA 6 5  4 5 - 8 0 Final    Leukocytes, UA Negative  Negative Final    Nitrite, UA Negative  Negative Final    Protein, UA Negative  Negative mg/dl Final    Glucose, UA Negative  Negative mg/dl Final    Ketones, UA Negative  Negative mg/dl Final    Urobilinogen, UA 0 2  0 2, 1 0 E U /dl E U /dl Final    Bilirubin, UA Negative  Negative Final    Specific Gravity, UA <=1 005  1 003 - 1 030 Final    Narrative:     CLINITEK RESULT   PROTIME-INR - Normal    Protime 13 5  12 1 - 14 4 seconds Final    INR 1 03  0 86 - 1 16 Final   APTT - Normal    PTT 29  23 - 35 seconds Final    Narrative: Therapeutic Heparin Range = 60-90 seconds   URINE MICROSCOPIC - Normal    RBC, UA None Seen  None Seen /hpf Final    WBC, UA None Seen  None Seen /hpf Final    Epithelial Cells None Seen  None Seen, Occasional /hpf Final    Bacteria, UA None Seen  None Seen, Occasional /hpf Final    Hyaline Casts, UA None Seen  None Seen /lpf Final   LACTIC ACID, PLASMA - Normal    LACTIC ACID 1 8  0 5 - 2 0 mmol/L Final    Narrative:     Result may be elevated if tourniquet was used during collection     POCT URINALYSIS DIPSTICK - Normal    Color, UA see chart results   Final   POCT TROPONIN - Normal    POC Troponin I 0 00  0 00 - 0 08 ng/ml Final    Specimen Type VENOUS   Final    Narrative:     [employer] i-Stat handheld analyzer 99% cutoff is > 0 08ng/mL in network Emergency Departments    o cTnI 99% cutoff is useful only when applied to patients in the clinical setting of myocardial ischemia  o cTnI 99% cutoff should be interpreted in the context of clinical history, ECG findings and possibly cardiac imaging to establish correct diagnosis  o cTnI 99% cutoff may be suggestive but clearly not indicative of a coronary event without the clinical setting of myocardial ischemia  BLOOD CULTURE   BLOOD CULTURE   WOUND CULTURE   LACTIC ACID, PLASMA       IR tube check   Final Result   Impression: Existing catheter is positioned eccentric to very small residual medial collection and was successfully exchanged and repositioned  There was no fluid return from this residual collection and output with irrigation was serosanguineous  Although this collection has been labeled "abscess", original cultures were negative  All signs indicate that this is likely an evolving hematoma and therefore unlikely source of fever and leukocytosis  Lack of significant decrease on serial CT is also    consistent with evolving hematoma  Plan: He was instructed to stop catheter flushing  Follow-up tube check has been arranged next week with hopeful tube removal  The procedure, findings, and follow-up were discussed with the patient's mother in detail          Workstation performed: [id number]         CTA chest ct abdomen pelvis w contrast   Final Result      1  Suboptimal evaluation of the pulmonary arteries for reasons discussed above  No evidence of central pulmonary embolus  2   Drainage catheter within left perinephric subcapsular collection  3   Small bilateral nonobstructing renal calculi  4   Hepatomegaly and hepatic steatosis     5   No evidence of acute abnormality in the chest, abdomen or pelvis  Workstation performed: [id number]             Procedures  Procedures      Phone Contacts  ED Phone Contact    ED Course  ED Course   [facility] Documentation   Comment Time   Pt with elevated WBC and lactate acid of 2 3  Additional IVF hydration given  Now meets SIRs criteria  Will start broad spectrum abx 08/17 1343                         Initial Sepsis Screening       08/17/17 1341                Is the patient's history suggestive of a new or worsening infection? (!)  Yes (Proceed)  -RP        Suspected source of infection acute abdominal infection;urinary tract infection  -RP        Are two or more of the following signs & symptoms of infection both present and new to the patient? Indicate SIRS criteria Leukocytosis (WBC > 71898 IJL)  -RP        If the answer is yes to both questions, suspicion of sepsis is present         If severe sepsis is present AND tissue hypoperfusion perists in the hour after fluid resuscitation or lactate > 4, the patient meets criteria for SEPTIC SHOCK         Are any of the following organ dysfunction criteria present within 6 hours of suspected infection and SIRS criteria that are NOT considered to be chronic conditions? Organ dysfunction Lactate > 2 0 mmol/L  -RP        Date of presentation of severe sepsis         Time of presentation of severe sepsis         Tissue hypoperfusion persists in the hour after crystalloid fluid administration, evidenced, by either:         Was hypotension present within one hour of the conclusion of crystalloid fluid administration?  No  -RP        Date of presentation of septic shock         Time of presentation of septic shock           User Key  (r) = Recorded By, (t) = Taken By, (c) = Cosigned By    [address] Name Provider Type    RP Rita Cook MD Physician           Default Flowsheet Data (last 720 hours)      Sepsis Reassessment       08/17/17 2050                Volume Status and Tissue Perfusion Post Fluid Resuscitation- Must Document ALL of the Following:    Vital Signs Reviewed Yes  -HK        Cardio Normal S1/S2  -HK        Pulmonary Normal effort  -HK        Capillary Refill Brisk  -HK        Peripheral Pulses Radial  -HK        Peripheral Pulse +2  -HK        Skin Warm;Dry  -HK        *OR*   Intensive Monitoring- Must Document Two * of the Following Four *:    Vital Signs Reviewed Yes  -HK        * Central Venous Pressure (CVP or RAP)         * Central Venous Oxygen (SVO2, ScvO2 or Oxygen saturation via central catheter)         * Bedside Cardiovascular US in IVC diameter and % collapse         * Passive Leg Raise OR Crystalloid Challenge           User Key  (r) = Recorded By, (t) = Taken By, (c) = Cosigned By    Initials Name Provider Type    VONDA Jain DO Resident                [facility]  [facility]    Disposition  Final diagnoses:   Perinephric abscess   Systemic inflammatory response syndrome (SIRS) due to infection   Leukocytosis     ED Disposition     ED Disposition Condition Comment    Admit  Case was discussed with SOD and the patient's admission status was agreed to be Admission Status: inpatient status to the service of Dr Jamie Fisher   Follow-up Information     Follow up With Specialties Details Why Contact Info    Blake Mackey MD Urology Call in 1 week(s) Drain Removal [address] [address]  [phone]          Current Discharge Medication List      CONTINUE these medications which have NOT CHANGED    Details   HAWTHORN BERRIES PO Take 60 mL by mouth daily      levofloxacin (LEVAQUIN) 750 mg tablet Take 1 tablet by mouth every 24 hours for 11 days  Qty: 11 tablet, Refills: 0           No discharge procedures on file      ED Provider  Electronically Signed by       Yonny Mcmillan MD  08/18/17 0157

## 2017-08-17 NOTE — ED NOTES
Patient's mother very upset that a chest xray was not done  RN explained to mother that chest CT was done which is a better picture, but mother still insisting on a chest xray  Mother also inquiring that she would like a second opinion  SOD resident paged at this time to make mother aware        Robert Craft RN  08/17/17 4609

## 2017-08-17 NOTE — SEDATION DOCUMENTATION
GREGORIO changed, patient instructed no loner needs tube to be flushed, patient to return in 1 week for possible tube removal

## 2017-08-17 NOTE — SEPSIS NOTE
Sepsis Note   Amy Frausto 29 y o  male MRN: [id number]  Unit/Bed#: ED 15 Encounter: [id number]            Initial Sepsis Screening       08/17/17 1341                Is the patient's history suggestive of a new or worsening infection? (!)  Yes (Proceed)  -RP        Suspected source of infection acute abdominal infection;urinary tract infection  -RP        Are two or more of the following signs & symptoms of infection both present and new to the patient? Indicate SIRS criteria Leukocytosis (WBC > 33504 IJL)  -RP        If the answer is yes to both questions, suspicion of sepsis is present         If severe sepsis is present AND tissue hypoperfusion perists in the hour after fluid resuscitation or lactate > 4, the patient meets criteria for SEPTIC SHOCK         Are any of the following organ dysfunction criteria present within 6 hours of suspected infection and SIRS criteria that are NOT considered to be chronic conditions? Organ dysfunction Lactate > 2 0 mmol/L  -RP        Date of presentation of severe sepsis         Time of presentation of severe sepsis         Tissue hypoperfusion persists in the hour after crystalloid fluid administration, evidenced, by either:         Was hypotension present within one hour of the conclusion of crystalloid fluid administration?  No  -RP        Date of presentation of septic shock         Time of presentation of septic shock           User Key  (r) = Recorded By, (t) = Taken By, (c) = Cosigned By    [address] Name Provider Yohana Elaine MD Physician

## 2017-08-18 LAB
ALBUMIN SERPL BCP-MCNC: 2.7 G/DL (ref 3.5–5)
ALP SERPL-CCNC: 56 U/L (ref 46–116)
ALT SERPL W P-5'-P-CCNC: 35 U/L (ref 12–78)
ANION GAP SERPL CALCULATED.3IONS-SCNC: 8 MMOL/L (ref 4–13)
APTT PPP: 34 SECONDS (ref 23–35)
AST SERPL W P-5'-P-CCNC: 17 U/L (ref 5–45)
BASOPHILS # BLD AUTO: 0.05 THOUSANDS/ΜL (ref 0–0.1)
BASOPHILS NFR BLD AUTO: 0 % (ref 0–1)
BILIRUB SERPL-MCNC: 0.58 MG/DL (ref 0.2–1)
BUN SERPL-MCNC: 10 MG/DL (ref 5–25)
CALCIUM SERPL-MCNC: 8.5 MG/DL (ref 8.3–10.1)
CHLORIDE SERPL-SCNC: 103 MMOL/L (ref 100–108)
CO2 SERPL-SCNC: 24 MMOL/L (ref 21–32)
CREAT SERPL-MCNC: 0.84 MG/DL (ref 0.6–1.3)
EOSINOPHIL # BLD AUTO: 0.27 THOUSAND/ΜL (ref 0–0.61)
EOSINOPHIL NFR BLD AUTO: 2 % (ref 0–6)
ERYTHROCYTE [DISTWIDTH] IN BLOOD BY AUTOMATED COUNT: 12.8 % (ref 11.6–15.1)
GFR SERPL CREATININE-BSD FRML MDRD: 117 ML/MIN/1.73SQ M
GLUCOSE SERPL-MCNC: 133 MG/DL (ref 65–140)
HCT VFR BLD AUTO: 37 % (ref 36.5–49.3)
HGB BLD-MCNC: 12.1 G/DL (ref 12–17)
INR PPP: 1.13 (ref 0.86–1.16)
LYMPHOCYTES # BLD AUTO: 2.3 THOUSANDS/ΜL (ref 0.6–4.47)
LYMPHOCYTES NFR BLD AUTO: 13 % (ref 14–44)
MCH RBC QN AUTO: 27.8 PG (ref 26.8–34.3)
MCHC RBC AUTO-ENTMCNC: 32.7 G/DL (ref 31.4–37.4)
MCV RBC AUTO: 85 FL (ref 82–98)
MONOCYTES # BLD AUTO: 2.3 THOUSAND/ΜL (ref 0.17–1.22)
MONOCYTES NFR BLD AUTO: 13 % (ref 4–12)
NEUTROPHILS # BLD AUTO: 13.12 THOUSANDS/ΜL (ref 1.85–7.62)
NEUTS SEG NFR BLD AUTO: 72 % (ref 43–75)
NRBC BLD AUTO-RTO: 0 /100 WBCS
PLATELET # BLD AUTO: 402 THOUSANDS/UL (ref 149–390)
PMV BLD AUTO: 9.7 FL (ref 8.9–12.7)
POTASSIUM SERPL-SCNC: 3.4 MMOL/L (ref 3.5–5.3)
PROT SERPL-MCNC: 7.2 G/DL (ref 6.4–8.2)
PROTHROMBIN TIME: 14.5 SECONDS (ref 12.1–14.4)
RBC # BLD AUTO: 4.35 MILLION/UL (ref 3.88–5.62)
SODIUM SERPL-SCNC: 135 MMOL/L (ref 136–145)
WBC # BLD AUTO: 18.11 THOUSAND/UL (ref 4.31–10.16)

## 2017-08-18 PROCEDURE — 85730 THROMBOPLASTIN TIME PARTIAL: CPT | Performed by: INTERNAL MEDICINE

## 2017-08-18 PROCEDURE — 80053 COMPREHEN METABOLIC PANEL: CPT | Performed by: INTERNAL MEDICINE

## 2017-08-18 PROCEDURE — 85025 COMPLETE CBC W/AUTO DIFF WBC: CPT | Performed by: INTERNAL MEDICINE

## 2017-08-18 PROCEDURE — 85610 PROTHROMBIN TIME: CPT | Performed by: INTERNAL MEDICINE

## 2017-08-18 RX ORDER — IBUPROFEN 400 MG/1
400 TABLET ORAL EVERY 6 HOURS PRN
Status: DISCONTINUED | OUTPATIENT
Start: 2017-08-18 | End: 2017-08-18

## 2017-08-18 RX ORDER — OXYCODONE HYDROCHLORIDE AND ACETAMINOPHEN 5; 325 MG/1; MG/1
1 TABLET ORAL EVERY 4 HOURS PRN
Status: DISCONTINUED | OUTPATIENT
Start: 2017-08-18 | End: 2017-08-20

## 2017-08-18 RX ORDER — ACETAMINOPHEN 325 MG/1
650 TABLET ORAL EVERY 6 HOURS PRN
Status: DISCONTINUED | OUTPATIENT
Start: 2017-08-18 | End: 2017-08-22 | Stop reason: HOSPADM

## 2017-08-18 RX ORDER — IBUPROFEN 400 MG/1
400 TABLET ORAL EVERY 6 HOURS PRN
Status: DISCONTINUED | OUTPATIENT
Start: 2017-08-18 | End: 2017-08-22 | Stop reason: HOSPADM

## 2017-08-18 RX ORDER — MAGNESIUM HYDROXIDE/ALUMINUM HYDROXICE/SIMETHICONE 120; 1200; 1200 MG/30ML; MG/30ML; MG/30ML
30 SUSPENSION ORAL EVERY 6 HOURS PRN
Status: DISCONTINUED | OUTPATIENT
Start: 2017-08-18 | End: 2017-08-22 | Stop reason: HOSPADM

## 2017-08-18 RX ORDER — POTASSIUM CHLORIDE 20 MEQ/1
20 TABLET, EXTENDED RELEASE ORAL ONCE
Status: COMPLETED | OUTPATIENT
Start: 2017-08-18 | End: 2017-08-18

## 2017-08-18 RX ORDER — ACETAMINOPHEN 325 MG/1
650 TABLET ORAL EVERY 6 HOURS PRN
Status: DISCONTINUED | OUTPATIENT
Start: 2017-08-18 | End: 2017-08-18

## 2017-08-18 RX ADMIN — IBUPROFEN 400 MG: 400 TABLET, FILM COATED ORAL at 13:21

## 2017-08-18 RX ADMIN — SODIUM CHLORIDE 125 ML/HR: 0.9 INJECTION, SOLUTION INTRAVENOUS at 12:41

## 2017-08-18 RX ADMIN — OXYCODONE HYDROCHLORIDE AND ACETAMINOPHEN 1 TABLET: 5; 325 TABLET ORAL at 19:40

## 2017-08-18 RX ADMIN — IBUPROFEN 400 MG: 400 TABLET, FILM COATED ORAL at 01:21

## 2017-08-18 RX ADMIN — SODIUM CHLORIDE 125 ML/HR: 0.9 INJECTION, SOLUTION INTRAVENOUS at 02:44

## 2017-08-18 RX ADMIN — HYDROMORPHONE HYDROCHLORIDE 2 MG: 2 INJECTION, SOLUTION INTRAMUSCULAR; INTRAVENOUS; SUBCUTANEOUS at 00:13

## 2017-08-18 RX ADMIN — POTASSIUM CHLORIDE 20 MEQ: 1500 TABLET, EXTENDED RELEASE ORAL at 18:07

## 2017-08-18 RX ADMIN — CEFEPIME 2000 MG: 2 INJECTION, POWDER, FOR SOLUTION INTRAMUSCULAR; INTRAVENOUS at 03:48

## 2017-08-18 RX ADMIN — ACETAMINOPHEN 650 MG: 325 TABLET, FILM COATED ORAL at 16:00

## 2017-08-18 RX ADMIN — CEFEPIME 2000 MG: 2 INJECTION, POWDER, FOR SOLUTION INTRAMUSCULAR; INTRAVENOUS at 15:45

## 2017-08-18 RX ADMIN — ALBUTEROL SULFATE 2 PUFF: 90 AEROSOL, METERED RESPIRATORY (INHALATION) at 21:07

## 2017-08-18 NOTE — SEPSIS NOTE
Sepsis Note   Smiley Johnston 29 y o  male MRN: [id number]  Unit/Bed#: [facility] 430-46 Encounter: [id number]            Initial Sepsis Screening       08/17/17 1341                Is the patient's history suggestive of a new or worsening infection? (!)  Yes (Proceed)  -RP        Suspected source of infection acute abdominal infection;urinary tract infection  -RP        Are two or more of the following signs & symptoms of infection both present and new to the patient? Indicate SIRS criteria Leukocytosis (WBC > 03593 IJL)  -RP        If the answer is yes to both questions, suspicion of sepsis is present         If severe sepsis is present AND tissue hypoperfusion perists in the hour after fluid resuscitation or lactate > 4, the patient meets criteria for SEPTIC SHOCK         Are any of the following organ dysfunction criteria present within 6 hours of suspected infection and SIRS criteria that are NOT considered to be chronic conditions? Organ dysfunction Lactate > 2 0 mmol/L  -RP        Date of presentation of severe sepsis         Time of presentation of severe sepsis         Tissue hypoperfusion persists in the hour after crystalloid fluid administration, evidenced, by either:         Was hypotension present within one hour of the conclusion of crystalloid fluid administration?  No  -RP        Date of presentation of septic shock         Time of presentation of septic shock           User Key  (r) = Recorded By, (t) = Taken By, (c) = Cosigned By    [address] Name Provider Type    RP Micheline Marshall MD Physician               Default Flowsheet Data (last 720 hours)      Sepsis Reassessment       08/17/17 2050                Volume Status and Tissue Perfusion Post Fluid Resuscitation- Must Document ALL of the Following:    Vital Signs Reviewed Yes  -HK        Cardio Normal S1/S2  -HK        Pulmonary Normal effort  -HK        Capillary Refill Brisk  -HK        Peripheral Pulses Radial  -HK Peripheral Pulse +2  -HK        Skin Warm;Dry  -HK        *OR*   Intensive Monitoring- Must Document Two * of the Following Four *:    Vital Signs Reviewed Yes  -HK        * Central Venous Pressure (CVP or RAP)         * Central Venous Oxygen (SVO2, ScvO2 or Oxygen saturation via central catheter)         * Bedside Cardiovascular US in IVC diameter and % collapse         * Passive Leg Raise OR Crystalloid Challenge           User Key  (r) = Recorded By, (t) = Taken By, (c) = Cosigned By    Initials Name Provider Type    [facility] DO Ignacia Resident        Exam completed at [address] [place] [address]  Scott MCKEON    Internal Medicine PGY-3  8/17/2017 8:52 PM

## 2017-08-18 NOTE — CASE MANAGEMENT
[address] [facility] in the [facility] by [address] for 2017  Network Utilization Review Department  Phone: [phone]; Fax [phone]  ATTENTION: The Network Utilization Review Department is now centralized for our 7 Facilities  Make a note that we have a new phone and fax numbers for our Department  Please call with any questions or concerns to [phone] and carefully follow the prompts so that you are directed to the right person  All voicemails are confidential  Fax any determinations, approvals, denials, and requests for initial or continue stay review clinical to [phone]  Due to HIGH CALL volume, it would be easier if you could please send faxed requests to expedite your requests and in part, help us provide discharge notifications faster  Initial Clinical Review    Admission: Date/Time/Statement: 8/17/17 @ 1408     Orders Placed This Encounter   Procedures    Inpatient Admission (expected length of stay for this patient is greater than two midnights)     Standing Status:   Standing     Number of Occurrences:   1     Order Specific Question:   Admitting Physician     Answer:   Mauricio Dickson     Order Specific Question:   Level of Care     Answer:   Med Surg [16]     Order Specific Question:   Estimated length of stay     Answer:   More than 2 Midnights     Order Specific Question:   Certification     Answer:   I certify that inpatient services are medically necessary for this patient for a duration of greater than two midnights  See H&P and MD Progress Notes for additional information about the patient's course of treatment           ED: Date/Time/Mode of Arrival:   ED Arrival Information     Expected Arrival Acuity Means of Arrival Escorted By Service Admission Type    - 8/17/2017 10:13 Urgent Walk-In Self General Medicine Urgent    Arrival Complaint    Fever          Chief Complaint:   Chief Complaint   Patient presents with    Shortness of Breath     Patient released from hospital on the 7th with GREGORIO drain, today patient woke up with fever of 104, feeling heaviness in chest and feeling SOB  History of Illness:    Alejandra Neville is a 29 y o  obese male with history of asthma, nephrolithiasis who presents to the ER with severe right flank pain, fevers and found to have severe sepsis  He was recently admitted to the hospital from 8-6 to 8-8-17 with similar symptoms, found to have a L-sided perinephric abscess and kidney stone, which was drained by IR and GREGORIO drain was placed  Urology was following as well, he was transitioned from IV levaquin to oral levaquin due to his penicillin allergy, which he has 2 days left to complete  His mother states since he got home, the drain was draining dark red blood and eventually stopped draining at all  He started to get severe flank pain and fevers 2 days ago  He denies urinary symptoms at this time  Previous to his last admission, he was treated with cipro for UTI and hydronephrosis after he had a lithotripsy with stent placement on 7/26/17, soon after developing Sepsis when the stent was pulled on 7/28  In ER, Lactic is 2 3, WBC 23, blood cx pending, he has a low grade fever and tachycardia  He is hemodynamically stable  CT chest negative for PE or active pulmonary infection  CT abd/pelvis shows persistent bilateral non-obstructing nephrolithiasis with left abscess, the same size as it was last admission (no resolution)    UA is negative    ED Vital Signs:   ED Triage Vitals   Temperature Pulse Respirations Blood Pressure SpO2   08/17/17 1027 08/17/17 1027 08/17/17 1027 08/17/17 1206 08/17/17 1027   99 7 °F (37 6 °C) (!) 120 20 127/55 96 %      Temp Source Heart Rate Source Patient Position BP Location FiO2 (%)   08/17/17 1027 08/17/17 1027 08/17/17 1027 08/17/17 1027 --   Oral Monitor Sitting Right arm       Pain Score       08/17/17 1027       8        Wt Readings from Last 1 Encounters:   08/17/17 130 kg (286 lb 6 oz)       Vital Signs (abnormal): wnl     Abnormal Labs/Diagnostic Test Results: lactic acid  2 3, Na  135  Total prot  8 5, wbc  23 08, plt ct  475  CTChest -1   Suboptimal evaluation of the pulmonary arteries for reasons discussed above   No evidence of central pulmonary embolus  2   Drainage catheter within left perinephric subcapsular collection  3   Small bilateral nonobstructing renal calculi  4   Hepatomegaly and hepatic steatosis  5   No evidence of acute abnormality in the chest, abdomen or pelvis    EKG- NSR     ED Treatment:   Medication Administration from 08/17/2017 1013 to 08/17/2017 1839       Date/Time Order Dose Route Action Action by Comments     08/17/2017 1300 sodium chloride 0 9 % bolus 1,000 mL 0 mL Intravenous Stopped [facility],       08/17/2017 1159 sodium chloride 0 9 % bolus 1,000 mL 1,000 mL Intravenous New Bag [facility], RN      08/17/2017 1201 ondansetron (ZOFRAN) injection 4 mg 4 mg Intravenous Given [facility], RN      08/17/2017 1201 HYDROmorphone (DILAUDID) 1 mg/mL injection 0 5 mg 0 5 mg Intravenous Given [facility],       08/17/2017 1304 iohexol (OMNIPAQUE) 350 MG/ML injection (MULTI-DOSE) 100 mL 100 mL Intravenous Given Hedda Hoof      08/17/2017 1559 vancomycin (VANCOCIN) IVPB (premix) 1,000 mg 0 mg Intravenous Stopped [facility], RN      08/17/2017 1459 vancomycin (VANCOCIN) IVPB (premix) 1,000 mg 1,000 mg Intravenous [address] [facility], RN      08/17/2017 1805 sodium chloride 0 9 % infusion 125 mL/hr Intravenous New Bag [place] Ye Cui, RN      08/17/2017 1640 cefepime (MAXIPIME) 2 g/50 mL dextrose IVPB 0 mg Intravenous Stopped [facility], RN      08/17/2017 1613 cefepime (MAXIPIME) 2 g/50 mL dextrose IVPB 2,000 mg Intravenous [address] [facility], RN      08/17/2017 1636 sodium chloride 0 9 % bolus 500 mL 0 mL Intravenous Stopped [facility], RN      08/17/2017 1844 sodium chloride 0 9 % bolus 500 mL 500 mL Intravenous [address] Susan Peters RN      08/17/2017 1752 iohexol (OMNIPAQUE) 300 mg/mL injection 50 mL 4 mL Intravenous Given Odilia Main           Past Medical/Surgical History:    Active Ambulatory Problems     Diagnosis Date Noted    Asthma 07/25/2017    Morbid obesity 07/25/2017    Perinephric abscess 08/05/2017    Nephrolithiasis     Sepsis secondary to UTI-perinephric abscess 08/05/2017     Resolved Ambulatory Problems     Diagnosis Date Noted    Ureteric stone 07/25/2017    Hydronephrosis, left 07/25/2017    Nephrolithiasis 07/25/2017    ELINA (acute kidney injury) 07/25/2017    Accelerated hypertension 07/25/2017     Past Medical History:   Diagnosis Date    Asthma     Hypertension     Nephrolithiasis        Admitting Diagnosis: Leukocytosis [D72 829]  Perinephric abscess [N15 1]  Fever [R50 9]  Systemic inflammatory response syndrome (SIRS) due to infection [A41 9]    Age/Sex: 29 y o  male    Assessment/Plan:    #Severe Sepsis  -Evidenced by leukocytosis, fever of 100 2, tachycardia, lactate of 2 3 with abdominal source  -2/2 left perinephric abscess/GREGORIO drain  -Hemodynamically stable  -Continue IV Cefepime - was on levaquin previously and allergic to penicillins   -UA negative  -Blood cx x2 pending   -Repeat lactic q2h until normalizes  -IR consultation as suspect GREGORIO drain is blocked/infected  -Urology called/made aware, consulted and are following  -Keep NPO for now until IR procedure, will start regular diet following procedure, continue NSS 125ml/hr   -Start Dilaudid 2mg IV Q4H PRN for pain, Zofran 4mg IV Q6H PRN for nausea  -Tylenol for fevers   #Left Perinephric abscess  -See above   #Asthma  -uses only herbal supplements at home, Albuterol PRN while in hospital  Needs outpatient follow-up   #Morbid Obesity  -BMI 36, will provide dietary and lifestyle modifications - hepatic steatosis likely 2/2 this   Code Status: Level 1 - Full Code  VTE Pharmacologic Prophylaxis: Reason for no pharmacologic prophylaxis possible IR procedure   VTE Mechanical Prophylaxis: sequential compression device  Admission Status: INPATIENT     Admission Orders:  Scheduled Meds:   cefepime 2,000 mg Intravenous Q12H   potassium chloride 20 mEq Oral Once     Continuous Infusions:   sodium chloride 125 mL/hr Last Rate: 125 mL/hr (08/18/17 1241)     PRN Meds:   acetaminophen    albuterol    aluminum-magnesium hydroxide-simethicone    ibuprofen    ondansetron    IR procedure   8/17  Procedure: Tube check and repositioned   Estimated Blood Loss: Minimal   Findings: Very small residual left perinephric hematoma  The catheter was repositioned into a small residual pocket that was identified with contrast injection  Output is serosanguineous with no purulence  Unlikely source of fever  Very little to no output should be expected from this drain  Outpatient appointment will be scheduled in one week for tube check and possible removal  He was instructed to stop catheter flushing  Internal med progress note  8/18  Assessment/Plan:     Principal Problem:    Severe sepsis  Active Problems:    Asthma    Morbid obesity    Perinephric abscess    Nephrolithiasis   1  Sepsis  -Presented with Leukocytosis at 23, temperature of 100 2, lactate of 2 3, with abdominal source  -Was considered likely secondary to left perinephric abscess and GREGORIO drain, blood cultures x 2 pending  -Was given one time dose of Vancomycin and started on IV Cefipime [Day 2]   -Urinalysis was negative, with just some trace RBC, urine culture was not ordered  -Repeat lactate was 1 8 and 1 2 Stopped trending  -Repeat CBC revealed leukocytosis down to 18  Will continue to trend     -IR performed a check of his tube and repositioned the GREGORIO drain and instructed patient to no longer flush the tube and to return in one week for possible tube removal    -Urology [Dr Emelia Rocha saw the patient and recommended the continuation of his IV antibiotics and to follow cultures  Patient will be stable to discharge once afebrile for 24 hours  Plan for outpatient drain removal  Recommendations greatly appreciated  -Patient on Normal Saline at a rate of 125 ml/hour  Patient is eating well  Will decrease IV fluid rate tomorrow 8/19/2017    -Continue Zofran 4mg IV Q6H PRN for nausea and Mylanta for indigestion  -Acetaminophen and Ibuprofen for fevers   2  Left Perinephric Abscess  -Patient still having pain at the GREGORIO drain site  Will continue to monitor   -Acetaminophen or Ibuprofen PRN for pain  -Stopped PRN Dilaudid as was making patient too somnolent   3  Asthma   -Patient complaining of some chest tightness with breathing, patient normally at home takes herbal supplements "Clear Lungs  "   -Will provide PRN Albuterol    4  Morbid Obesity   -BMI is 36    -Will provide dietary and lifestyle modifications    -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 obesity     5  Hypokalemia  -Was 3 4, repleted once  -Will continue to monitor    Disposition: Patient's family was wanting a second opinion in care for the patient and wants patient to be transferred to [facility]  Discussed with patient and mother the pending culture results and senior resident reviewed the CTA with patient and mother  [facility] was contacted and did not accept the transfer  The patient and his family decided to stay at [facility] for further management       Urology consult  8/18   29 y o  old male with  history of left ureteral calculus status post left ureteroscopy, left perinephric collection status post drainage, fever        PLAN:    I recommend continuing IV abx  Follow cultures  Maintain left perinephric drain at this time  [id number] [address] for DC from a  standpoint once afebrile for 24 hrs  DC home with PO Cipro for 5 additional days recommended    Plan for outpatient drain removal   The patients's mom requests outpt CBC prior to drain removal   I will coordinate in the outpt setting with my RN

## 2017-08-18 NOTE — CONSULTS
UROLOGY CONSULTATION NOTE     Patient Identifiers: Reg Driscoll (MRN [id number])  Service Requesting Consultation: Medicine  Service Providing Consultation:  Urology, Génesis Mullins MD    Date of Service: 8/18/2017    Reason for Consultation: Fever, left perinephric abscess, history of left ureteral calculus status post ureteroscopy    History of Present Illness:     Reg Driscoll is a 29 y o  with a history of Nephrolithiasis  In late July 2017 he was found to have a left proximal 5 mm ureteral calculus  At that time he was at the [address] [place] [address]  He was admitted and underwent left-sided ureteroscopy with holmium laser lithotripsy  The procedure was uneventful  A left ureteral stent was placed  After the stent was removed, the patient developed fever  A CT scan showed a small left perinephric abscess  The patient was taken to interventional radiology where a drain was placed  The patient is now readmitted complaining of left-sided flank pain along with persistent fever  On admission his temperature is noted to be 101 8  He is now afebrile  On admission a CT scan of the chest abdomen and pelvis was performed  There is no evidence of pulmonary embolus  A very small subcentimeter fluid collection is still identified adjacent to the left kidney with a percutaneous drainage catheter in place    He was recently taken to interventional     Past Medical, Past Surgical History:     Past Medical History:   Diagnosis Date    Asthma     Hypertension     Nephrolithiasis      Past Surgical History:   Procedure Laterality Date    DENTAL SURGERY      NM CYSTO/URETERO W/LITHOTRIPSY &INDWELL STENT INSRT Left 7/26/2017    Procedure: CYSTOSCOPY URETEROSCOPY WITH LITHOTRIPSY HOLMIUM LASER, RETROGRADE PYELOGRAM AND INSERTION STENT URETERAL;  Surgeon: Génesis Mullins MD;  Location: [facility];  Service: Urology      Medications, Allergies:   Scheduled Meds:  cefepime 2,000 mg Intravenous Q12H     Continuous Infusions:  sodium chloride 125 mL/hr Last Rate: 125 mL/hr (08/18/17 0244)     PRN Meds:   acetaminophen    albuterol    HYDROmorphone    ibuprofen    ondansetron    Allergies: Allergies   Allergen Reactions    Amoxicillin Hives    Penicillins Hives   :    Social and Family History:   Social History:   Social History   Substance Use Topics    Smoking status: Never Smoker    Smokeless tobacco: Never Used    Alcohol use Yes      Comment: OCCASIONALLY     History   Smoking Status    Never Smoker   Smokeless Tobacco    Never Used       Family History:  Family History   Problem Relation Age of Onset    Diabetes Father     Hypertension Father     Diabetes Maternal Grandfather    :     Review of Systems:   He reports left flank discomfort  He denies chest pain or shortness of  Breath  He denies abdominal pain  All other systems queried were negative  Physical Exam:     Vitals:    08/18/17 0708   BP: 114/63   Pulse: 97   Resp: 18   Temp: 98 2 °F (36 8 °C)   SpO2: 97%       PE:  Gen  : Awake alert and oriented, non-ill, nontoxic  HEENT: Normocephalic, atraumatic, sclerae nonicteric  Cardiac: Regular  Abdomen: Soft nontender nondistended  : Left perinephric drain in place with minimal output    Drainage serosanguineous appearing  Skin: Warm  Extremities: Without edema  Neurologic: Grossly intact and nonfocal  Affect: Flat    Labs:     Lab Results   Component Value Date    HGB 12 1 08/18/2017    HCT 37 0 08/18/2017    WBC 18 11 (H) 08/18/2017     (H) 08/18/2017   ]    Lab Results   Component Value Date     (L) 08/18/2017    K 3 4 (L) 08/18/2017     08/18/2017    CO2 24 08/18/2017    BUN 10 08/18/2017    CREATININE 0 84 08/18/2017    CALCIUM 8 5 08/18/2017    GLUCOSE 133 08/18/2017   ]    All cultures including blood, urine,  And perinephric drainage have been negative to date  Imaging:   CT scan as above    ASSESSMENT:     29 y o  old male with  history of left ureteral calculus status post left ureteroscopy, left perinephric collection status post drainage, fever  PLAN:     I recommend continuing IV abx  Follow cultures  Maintain left perinephric drain at this time  [id number] [address] for DC from a  standpoint once afebrile for 24 hrs  DC home with PO Cipro for 5 additional days recommended  Plan for outpatient drain removal   The patients's mom requests outpt CBC prior to drain removal   I will coordinate in the outpt setting with my RN  Thank you for allowing me to participate in this patients care  Please do not hesitate to call with any additional questions    Long Crow MD

## 2017-08-18 NOTE — PROGRESS NOTES
IM Residency Progress Note   Unit/Bed#: [facility] 602-01 Encounter: [id number]  SOD Team B       Nehemias Lord 29 y o  male [id number]    Hospital Stay Days: 1      Assessment/Plan:    Principal Problem:    Severe sepsis  Active Problems:    Asthma    Morbid obesity    Perinephric abscess    Nephrolithiasis    1  Sepsis  -Presented with Leukocytosis at 23, temperature of 100 2, lactate of 2 3, with abdominal source  -Was considered likely secondary to left perinephric abscess and GREGORIO drain, blood cultures x 2 pending  -Was given one time dose of Vancomycin and started on IV Cefipime [Day 2]   -Urinalysis was negative, with just some trace RBC, urine culture was not ordered  -Repeat lactate was 1 8 and 1 2 Stopped trending  -Repeat CBC revealed leukocytosis down to 18  Will continue to trend  -IR performed a check of his tube and repositioned the GREGORIO drain and instructed patient to no longer flush the tube and to return in one week for possible tube removal    -Urology [Dr Martha Warner saw the patient and recommended the continuation of his IV antibiotics and to follow cultures  Patient will be stable to discharge once afebrile for 24 hours  Plan for outpatient drain removal  Recommendations greatly appreciated  -Patient on Normal Saline at a rate of 125 ml/hour  Patient is eating well  Will decrease IV fluid rate tomorrow 8/19/2017    -Continue Zofran 4mg IV Q6H PRN for nausea and Mylanta for indigestion  -Acetaminophen and Ibuprofen for fevers    2  Left Perinephric Abscess  -Patient still having pain at the GREGORIO drain site  Will continue to monitor   -Acetaminophen or Ibuprofen PRN for pain  -Stopped PRN Dilaudid as was making patient too somnolent    3  Asthma   -Patient complaining of some chest tightness with breathing, patient normally at home takes herbal supplements "Clear Lungs  "   -Will provide PRN Albuterol     4   Morbid Obesity   -BMI is 36    -Will provide dietary and lifestyle modifications    -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 obesity  5  Hypokalemia  -Was 3 4, repleted once  -Will continue to monitor       Disposition: Patient's family was wanting a second opinion in care for the patient and wants patient to be transferred to [facility]  Discussed with patient and mother the pending culture results and senior resident reviewed the CTA with patient and mother  [facility] was contacted and did not accept the transfer  The patient and his family decided to stay at [facility] for further management  Subjective:   Patient laying in bed  He is somnolent, drifting in and out of sleep  He is in pain currently on his left flank that is radiating to the groin and rated a 7 on a scale of 1-10  He states this is similar to the pain he experienced before  Patient reports that he was feeling better up until the night before yesterday where he started feeling weak  He woke up with a fever and chills and sweats  He states that he had two days left on his antibiotics  Patient's mother is at bedside and she is asking for review of the CT of abdomen  She states that she wants a second opinion because maybe the Interventional Radiologist missed what might be going on  She says we "the hospital" are missing his infection because the white count is up and he has a fever  She says his urine has been smelling "weird "  The patient however denies any change in urinary frequency, color, or smell  He denies dysuria, diarrhea, vomiting, decrease in appetite, chest pain  Allan Glass He endorses some nausea, chills, and fever  He states deep inspiration causes some pain in his left upper abdomen and chest tightness  Mother thinks there is fluid there although CTA did not reveal so        Vitals: Temp (24hrs), Av 2 °F (37 9 °C), Min:98 7 °F (37 1 °C), Max:101 8 °F (38 8 °C)  Current: Temperature: 98 7 °F (37 1 °C)  Vitals:    17 0007 17 0100 17 0226 17 0353   BP:       Pulse: Resp:       Temp: (!) 101 °F (38 3 °C) (!) 101 2 °F (38 4 °C) 100 °F (37 8 °C) 98 7 °F (37 1 °C)   TempSrc: Oral Oral Oral Oral   SpO2:       Weight:       Height:        Body mass index is 35 79 kg/m²  I/O last 24 hours: In: 2937 1 [P O :360; I V :1077 1;  IV Piggyback:1500]  Out: 490 [Urine:440; Drains:50]      Physical Exam: /59   Pulse 100   Temp 98 7 °F (37 1 °C) (Oral)   Resp 18   Ht 6' 3" (1 905 m)   Wt 130 kg (286 lb 6 oz)   SpO2 97%   BMI 35 79 kg/m²   General appearance: alert, appears stated age, cooperative and fatigued  Head: Normocephalic, without obvious abnormality, atraumatic  Lungs: clear to auscultation bilaterally  Heart: regular rate and rhythm, S1, S2 normal, no murmur, click, rub or gallop  Abdomen: normal findings: bowel sounds normal and abnormal findings:  distended and LLQ tenderness, Left sided Flank pain, Left sided GREGORIO Drain in place  Pulses: 2+ and symmetric  Neurologic: Grossly normal     Invasive Devices     Peripheral Intravenous Line            Peripheral IV 08/17/17 Left Antecubital less than 1 day          Drain            Closed/Suction Drain Left Perineal Bulb 8 5 Fr  less than 1 day                    Labs:   Recent Results (from the past 24 hour(s))   CBC and differential    Collection Time: 08/17/17 12:08 PM   Result Value Ref Range    WBC 23 08 (H) 4 31 - 10 16 Thousand/uL    RBC 4 81 3 88 - 5 62 Million/uL    Hemoglobin 13 6 12 0 - 17 0 g/dL    Hematocrit 39 6 36 5 - 49 3 %    MCV 82 82 - 98 fL    MCH 28 3 26 8 - 34 3 pg    MCHC 34 3 31 4 - 37 4 g/dL    RDW 12 5 11 6 - 15 1 %    MPV 9 4 8 9 - 12 7 fL    Platelets 520 (H) 493 - 390 Thousands/uL    nRBC 0 /100 WBCs    Neutrophils Relative 85 (H) 43 - 75 %    Lymphocytes Relative 7 (L) 14 - 44 %    Monocytes Relative 7 4 - 12 %    Eosinophils Relative 1 0 - 6 %    Basophils Relative 0 0 - 1 %    Neutrophils Absolute 19 52 (H) 1 85 - 7 62 Thousands/µL    Lymphocytes Absolute 1 61 0 60 - 4 47 Thousands/µL Monocytes Absolute 1 69 (H) 0 17 - 1 22 Thousand/µL    Eosinophils Absolute 0 13 0 00 - 0 61 Thousand/µL    Basophils Absolute 0 05 0 00 - 0 10 Thousands/µL   Comprehensive metabolic panel    Collection Time: 08/17/17 12:08 PM   Result Value Ref Range    Sodium 135 (L) 136 - 145 mmol/L    Potassium 4 4 3 5 - 5 3 mmol/L    Chloride 101 100 - 108 mmol/L    CO2 25 21 - 32 mmol/L    Anion Gap 9 4 - 13 mmol/L    BUN 13 5 - 25 mg/dL    Creatinine 0 95 0 60 - 1 30 mg/dL    Glucose 114 65 - 140 mg/dL    Calcium 9 4 8 3 - 10 1 mg/dL    AST 31 5 - 45 U/L    ALT 51 12 - 78 U/L    Alkaline Phosphatase 65 46 - 116 U/L    Total Protein 8 5 (H) 6 4 - 8 2 g/dL    Albumin 3 5 3 5 - 5 0 g/dL    Total Bilirubin 0 65 0 20 - 1 00 mg/dL    eGFR 107 ml/min/1 73sq m   Protime-INR    Collection Time: 08/17/17 12:08 PM   Result Value Ref Range    Protime 13 5 12 1 - 14 4 seconds    INR 1 03 0 86 - 1 16   APTT    Collection Time: 08/17/17 12:08 PM   Result Value Ref Range    PTT 29 23 - 35 seconds   Lactic acid, plasma    Collection Time: 08/17/17 12:08 PM   Result Value Ref Range    LACTIC ACID 2 3 (HH) 0 5 - 2 0 mmol/L   POCT troponin    Collection Time: 08/17/17 12:15 PM   Result Value Ref Range    POC Troponin I 0 00 0 00 - 0 08 ng/ml    Specimen Type VENOUS    ECG 12 lead    Collection Time: 08/17/17 12:31 PM   Result Value Ref Range    Ventricular Rate 93 BPM    Atrial Rate 93 BPM    ME Interval 166 ms    QRSD Interval 70 ms    QT Interval 320 ms    QTC Interval 397 ms    P Axis 62 degrees    QRS Axis 46 degrees    T Wave Daz[place] 58 degrees   ED Urine Macroscopic    Collection Time: 08/17/17  1:31 PM   Result Value Ref Range    Color, UA Yellow     Clarity, UA Clear     pH, UA 6 5 4 5 - 8 0    Leukocytes, UA Negative Negative    Nitrite, UA Negative Negative    Protein, UA Negative Negative mg/dl    Glucose, UA Negative Negative mg/dl    Ketones, UA Negative Negative mg/dl    Urobilinogen, UA 0 2 0 2, 1 0 E U /dl E U /dl    Bilirubin, UA Negative Negative    Blood, UA Trace (A) Negative    Specific Gravity, UA <=1 005 1 003 - 1 030   Urine Microscopic    Collection Time: 08/17/17  1:31 PM   Result Value Ref Range    RBC, UA None Seen None Seen /hpf    WBC, UA None Seen None Seen /hpf    Epithelial Cells None Seen None Seen, Occasional /hpf    Bacteria, UA None Seen None Seen, Occasional /hpf    Hyaline Casts, UA None Seen None Seen /lpf   POCT urinalysis dipstick    Collection Time: 08/17/17  2:01 PM   Result Value Ref Range    Color, UA see chart results    Lactic Acid STAT and in 2 hours if first result greater than 2    Collection Time: 08/17/17  4:39 PM   Result Value Ref Range    LACTIC ACID 1 8 0 5 - 2 0 mmol/L   Lactic acid, plasma    Collection Time: 08/17/17  8:14 PM   Result Value Ref Range    LACTIC ACID 1 2 0 5 - 2 0 mmol/L   CBC with differential    Collection Time: 08/18/17  4:46 AM   Result Value Ref Range    WBC 18 11 (H) 4 31 - 10 16 Thousand/uL    RBC 4 35 3 88 - 5 62 Million/uL    Hemoglobin 12 1 12 0 - 17 0 g/dL    Hematocrit 37 0 36 5 - 49 3 %    MCV 85 82 - 98 fL    MCH 27 8 26 8 - 34 3 pg    MCHC 32 7 31 4 - 37 4 g/dL    RDW 12 8 11 6 - 15 1 %    MPV 9 7 8 9 - 12 7 fL    Platelets 509 (H) 669 - 390 Thousands/uL    nRBC 0 /100 WBCs    Neutrophils Relative 72 43 - 75 %    Lymphocytes Relative 13 (L) 14 - 44 %    Monocytes Relative 13 (H) 4 - 12 %    Eosinophils Relative 2 0 - 6 %    Basophils Relative 0 0 - 1 %    Neutrophils Absolute 13 12 (H) 1 85 - 7 62 Thousands/µL    Lymphocytes Absolute 2 30 0 60 - 4 47 Thousands/µL    Monocytes Absolute 2 30 (H) 0 17 - 1 22 Thousand/µL    Eosinophils Absolute 0 27 0 00 - 0 61 Thousand/µL    Basophils Absolute 0 05 0 00 - 0 10 Thousands/µL   Comprehensive metabolic panel    Collection Time: 08/18/17  4:46 AM   Result Value Ref Range    Sodium 135 (L) 136 - 145 mmol/L    Potassium 3 4 (L) 3 5 - 5 3 mmol/L    Chloride 103 100 - 108 mmol/L    CO2 24 21 - 32 mmol/L    Anion Gap 8 4 - 13 mmol/L BUN 10 5 - 25 mg/dL    Creatinine 0 84 0 60 - 1 30 mg/dL    Glucose 133 65 - 140 mg/dL    Calcium 8 5 8 3 - 10 1 mg/dL    AST 17 5 - 45 U/L    ALT 35 12 - 78 U/L    Alkaline Phosphatase 56 46 - 116 U/L    Total Protein 7 2 6 4 - 8 2 g/dL    Albumin 2 7 (L) 3 5 - 5 0 g/dL    Total Bilirubin 0 58 0 20 - 1 00 mg/dL    eGFR 117 ml/min/1 73sq m   Protime-INR    Collection Time: 08/18/17  4:46 AM   Result Value Ref Range    Protime 14 5 (H) 12 1 - 14 4 seconds    INR 1 13 0 86 - 1 16   APTT    Collection Time: 08/18/17  4:46 AM   Result Value Ref Range    PTT 34 23 - 35 seconds       Radiology Results: I have personally reviewed pertinent reports  Cta Chest Ct Abdomen Pelvis W Contrast    Result Date: 8/17/2017  Impression: 1  Suboptimal evaluation of the pulmonary arteries for reasons discussed above  No evidence of central pulmonary embolus  2   Drainage catheter within left perinephric subcapsular collection  3   Small bilateral nonobstructing renal calculi  4   Hepatomegaly and hepatic steatosis  5   No evidence of acute abnormality in the chest, abdomen or pelvis  Workstation performed: [id number]       Other Diagnostic Testing:   I have personally reviewed pertinent reports        Results from last 7 days  Lab Units 08/18/17  0446 08/17/17  1208   SODIUM mmol/L 135* 135*   POTASSIUM mmol/L 3 4* 4 4   CHLORIDE mmol/L 103 101   CO2 mmol/L 24 25   BUN mg/dL 10 13   CREATININE mg/dL 0 84 0 95   CALCIUM mg/dL 8 5 9 4   TOTAL PROTEIN g/dL 7 2 8 5*   BILIRUBIN TOTAL mg/dL 0 58 0 65   ALK PHOS U/L 56 65   ALT U/L 35 51   AST U/L 17 31   GLUCOSE RANDOM mg/dL 133 114         Active Meds:   Current Facility-Administered Medications   Medication Dose Route Frequency    acetaminophen (TYLENOL) tablet 650 mg  650 mg Oral Q6H PRN    albuterol (PROVENTIL HFA,VENTOLIN HFA) inhaler 2 puff  2 puff Inhalation Q4H PRN    cefepime (MAXIPIME) 2 g/50 mL dextrose IVPB  2,000 mg Intravenous Q12H    HYDROmorphone (DILAUDID) 2 mg/mL injection 2 mg  2 mg Intravenous Q4H PRN    ibuprofen (MOTRIN) tablet 400 mg  400 mg Oral Q6H PRN    ondansetron (ZOFRAN) injection 4 mg  4 mg Intravenous Q6H PRN    sodium chloride 0 9 % infusion  125 mL/hr Intravenous Continuous         VTE Pharmacologic Prophylaxis: Reason for no pharmacologic prophylaxis patient had IR procedure performed and has history of hematuria    VTE Mechanical Prophylaxis: sequential compression device    Adam Nina MD   PGY-1

## 2017-08-19 ENCOUNTER — APPOINTMENT (INPATIENT)
Dept: RADIOLOGY | Facility: HOSPITAL | Age: 32
DRG: 710 | End: 2017-08-19
Payer: COMMERCIAL

## 2017-08-19 LAB
ALBUMIN SERPL BCP-MCNC: 2.6 G/DL (ref 3.5–5)
ALP SERPL-CCNC: 54 U/L (ref 46–116)
ALT SERPL W P-5'-P-CCNC: 28 U/L (ref 12–78)
ANION GAP SERPL CALCULATED.3IONS-SCNC: 5 MMOL/L (ref 4–13)
AST SERPL W P-5'-P-CCNC: 15 U/L (ref 5–45)
BASOPHILS # BLD AUTO: 0.04 THOUSANDS/ΜL (ref 0–0.1)
BASOPHILS NFR BLD AUTO: 0 % (ref 0–1)
BILIRUB SERPL-MCNC: 0.54 MG/DL (ref 0.2–1)
BILIRUB UR QL STRIP: NEGATIVE
BUN SERPL-MCNC: 7 MG/DL (ref 5–25)
CALCIUM SERPL-MCNC: 8.3 MG/DL (ref 8.3–10.1)
CHLORIDE SERPL-SCNC: 107 MMOL/L (ref 100–108)
CLARITY UR: CLEAR
CO2 SERPL-SCNC: 26 MMOL/L (ref 21–32)
COLOR UR: YELLOW
CREAT SERPL-MCNC: 0.66 MG/DL (ref 0.6–1.3)
EOSINOPHIL # BLD AUTO: 0.36 THOUSAND/ΜL (ref 0–0.61)
EOSINOPHIL NFR BLD AUTO: 2 % (ref 0–6)
ERYTHROCYTE [DISTWIDTH] IN BLOOD BY AUTOMATED COUNT: 12.7 % (ref 11.6–15.1)
GFR SERPL CREATININE-BSD FRML MDRD: 130 ML/MIN/1.73SQ M
GLUCOSE SERPL-MCNC: 113 MG/DL (ref 65–140)
GLUCOSE UR STRIP-MCNC: NEGATIVE MG/DL
HCT VFR BLD AUTO: 32.9 % (ref 36.5–49.3)
HGB BLD-MCNC: 11 G/DL (ref 12–17)
HGB UR QL STRIP.AUTO: NEGATIVE
KETONES UR STRIP-MCNC: NEGATIVE MG/DL
LEUKOCYTE ESTERASE UR QL STRIP: NEGATIVE
LYMPHOCYTES # BLD AUTO: 2.56 THOUSANDS/ΜL (ref 0.6–4.47)
LYMPHOCYTES NFR BLD AUTO: 15 % (ref 14–44)
MCH RBC QN AUTO: 27.9 PG (ref 26.8–34.3)
MCHC RBC AUTO-ENTMCNC: 33.4 G/DL (ref 31.4–37.4)
MCV RBC AUTO: 84 FL (ref 82–98)
MONOCYTES # BLD AUTO: 2.04 THOUSAND/ΜL (ref 0.17–1.22)
MONOCYTES NFR BLD AUTO: 12 % (ref 4–12)
NEUTROPHILS # BLD AUTO: 11.61 THOUSANDS/ΜL (ref 1.85–7.62)
NEUTS SEG NFR BLD AUTO: 71 % (ref 43–75)
NITRITE UR QL STRIP: NEGATIVE
NRBC BLD AUTO-RTO: 0 /100 WBCS
PH UR STRIP.AUTO: 5.5 [PH] (ref 4.5–8)
PLATELET # BLD AUTO: 367 THOUSANDS/UL (ref 149–390)
PMV BLD AUTO: 9.2 FL (ref 8.9–12.7)
POTASSIUM SERPL-SCNC: 4.3 MMOL/L (ref 3.5–5.3)
PROT SERPL-MCNC: 7.2 G/DL (ref 6.4–8.2)
PROT UR STRIP-MCNC: NEGATIVE MG/DL
RBC # BLD AUTO: 3.94 MILLION/UL (ref 3.88–5.62)
SODIUM SERPL-SCNC: 138 MMOL/L (ref 136–145)
SP GR UR STRIP.AUTO: >1.045 (ref 1–1.03)
UROBILINOGEN UR QL STRIP.AUTO: 0.2 E.U./DL
WBC # BLD AUTO: 16.66 THOUSAND/UL (ref 4.31–10.16)

## 2017-08-19 PROCEDURE — 71260 CT THORAX DX C+: CPT

## 2017-08-19 PROCEDURE — 80053 COMPREHEN METABOLIC PANEL: CPT | Performed by: INTERNAL MEDICINE

## 2017-08-19 PROCEDURE — 74160 CT ABDOMEN W/CONTRAST: CPT

## 2017-08-19 PROCEDURE — 81003 URINALYSIS AUTO W/O SCOPE: CPT | Performed by: INTERNAL MEDICINE

## 2017-08-19 PROCEDURE — 85025 COMPLETE CBC W/AUTO DIFF WBC: CPT | Performed by: INTERNAL MEDICINE

## 2017-08-19 RX ORDER — AMOXICILLIN 250 MG
1 CAPSULE ORAL 2 TIMES DAILY
Status: DISCONTINUED | OUTPATIENT
Start: 2017-08-19 | End: 2017-08-22 | Stop reason: HOSPADM

## 2017-08-19 RX ORDER — SODIUM CHLORIDE 9 MG/ML
90 INJECTION, SOLUTION INTRAVENOUS CONTINUOUS
Status: DISCONTINUED | OUTPATIENT
Start: 2017-08-19 | End: 2017-08-20

## 2017-08-19 RX ADMIN — OXYCODONE HYDROCHLORIDE AND ACETAMINOPHEN 1 TABLET: 5; 325 TABLET ORAL at 01:45

## 2017-08-19 RX ADMIN — Medication 1 TABLET: at 19:57

## 2017-08-19 RX ADMIN — ONDANSETRON 4 MG: 2 INJECTION INTRAMUSCULAR; INTRAVENOUS at 09:34

## 2017-08-19 RX ADMIN — CEFEPIME 2000 MG: 2 INJECTION, POWDER, FOR SOLUTION INTRAMUSCULAR; INTRAVENOUS at 03:14

## 2017-08-19 RX ADMIN — OXYCODONE HYDROCHLORIDE AND ACETAMINOPHEN 1 TABLET: 5; 325 TABLET ORAL at 16:36

## 2017-08-19 RX ADMIN — OXYCODONE HYDROCHLORIDE AND ACETAMINOPHEN 1 TABLET: 5; 325 TABLET ORAL at 21:24

## 2017-08-19 RX ADMIN — Medication 1 TABLET: at 11:16

## 2017-08-19 RX ADMIN — SODIUM CHLORIDE 125 ML/HR: 0.9 INJECTION, SOLUTION INTRAVENOUS at 06:32

## 2017-08-19 RX ADMIN — SODIUM CHLORIDE 90 ML/HR: 0.9 INJECTION, SOLUTION INTRAVENOUS at 16:41

## 2017-08-19 RX ADMIN — OXYCODONE HYDROCHLORIDE AND ACETAMINOPHEN 1 TABLET: 5; 325 TABLET ORAL at 09:34

## 2017-08-19 RX ADMIN — ACETAMINOPHEN 650 MG: 325 TABLET, FILM COATED ORAL at 00:53

## 2017-08-19 RX ADMIN — CEFEPIME 2000 MG: 2 INJECTION, POWDER, FOR SOLUTION INTRAMUSCULAR; INTRAVENOUS at 16:36

## 2017-08-19 RX ADMIN — IOHEXOL 100 ML: 350 INJECTION, SOLUTION INTRAVENOUS at 13:03

## 2017-08-19 RX ADMIN — IOHEXOL 50 ML: 240 INJECTION, SOLUTION INTRATHECAL; INTRAVASCULAR; INTRAVENOUS; ORAL at 11:14

## 2017-08-19 NOTE — PLAN OF CARE
INFECTION - ADULT     Absence or prevention of progression during hospitalization Progressing     Absence of fever/infection during neutropenic period Progressing        PAIN - ADULT     Verbalizes/displays adequate comfort level or baseline comfort level Progressing        SAFETY ADULT     Patient will remain free of falls Progressing     Maintain or return to baseline ADL function Progressing     Maintain or return mobility status to optimal level Progressing

## 2017-08-19 NOTE — PROGRESS NOTES
IM Residency Progress Note   Unit/Bed#: [facility] 602-01 Encounter: [id number]  SOD Team B       Tico Carr 29 y o  male [id number]    Hospital Stay Days: 2      Assessment/Plan:    Principal Problem:    Severe sepsis  Active Problems:    Asthma    Morbid obesity    Perinephric abscess    Nephrolithiasis    1  Severe sepsis  · POA with Leukocytosis, febrile, lactic acidosis 2 3  · Still with low-grade fevers overnight  Leukocytosis of 16,000  · Unclear source, thought to be 2/2 #2 though underwent IR tube exchange and no purulence noted  UA negative, no Cx obtained  · Repeat Blood Cx from 8/17 NG at 24 hours  · IV Cefepime day 3  Transition to PO Cipro for 5 additional days when ready for d/c (afebrile for 24 hours) per Urology recommendations  Patient will follow-up as outpatient in 1 week for tube check and possible removal  · Continue IV fluids at 90cc/hr  · Acetaminophen and Ibuprofen prn when febrile  Percocet q4 prn for moderate pain  · Consider ID consult if temperatures do not improve to look for another source  · Obtain urine cx to better tailor abx    2  Left perinephric abscess with h/o nephrolithiasis  · S/p IR-guided tube change on 8/17  No purulence noted  S/p stent removal and ureteroscopy with holmium laser lithotripsy in late July  Admitted 8/6 - 8/8 after development of perinephric abscess  Treated with Levaquin  · Continue management as outlined in #1  · Increasing L-sided pain, with pleuritic component  Patient very nauseous today due to pain  Will obtain repeat CT chest/abd to further evaluate  3  Asthma   · Continue PRN Albuterol     4  Morbid Obesity   · BMI is 36  · Will provide dietary and lifestyle modifications  · 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 obesity  5  Hypokalemia  · Repleted  4 3 today  · Continue to monitor       Disposition: Management as outlined above  Continue abx day #3    Consider ID consult if febrile episodes do not improve       Subjective: Patient seen and examined this morning  Increasing L-sided pain with pleuritic component  Continues to have low-grade temps overnight  Today is day #3 cefepime  Patient feels clinically worse today  Minimal output from j-tube     Vitals: Temp (24hrs), Av 1 °F (37 3 °C), Min:98 3 °F (36 8 °C), Max:100 1 °F (37 8 °C)  Current: Temperature: 99 2 °F (37 3 °C)  Vitals:    17 1900 17 2300 17 0034 17 0300   BP: 118/67 136/64  120/53   Pulse: 88 105  92   Resp:    Temp: 98 3 °F (36 8 °C) 99 4 °F (37 4 °C) 100 1 °F (37 8 °C) 99 2 °F (37 3 °C)   TempSrc: Oral Oral Oral Oral   SpO2: 97% 97%  96%   Weight:       Height:        Body mass index is 35 79 kg/m²  I/O last 24 hours: In: 4144 2 [P O :1100; I V :995 8; IV Piggyback: 3]  Out: 1150 [Urine:1150]    Physical Exam  General appearance: alert, appears stated age, Patient appears uncomfortable this morning  Head: Normocephalic, without obvious abnormality, atraumatic  Lungs: clear to auscultation bilaterally   L-sided pleuritic chest pain with inspiration  Heart: regular rate and rhythm, S1, S2 normal, no murmur, click, rub or gallop  Abdomen: normal findings: bowel sounds normal and abnormal findings:  distended and LLQ tenderness, Left sided Flank pain, Left sided GREGORIO Drain in place with minimal bloddy fluid  Pulses: 2+ and symmetric  Neurologic: Grossly normal       Invasive Devices     Peripheral Intravenous Line            Peripheral IV 17 Left Antecubital 1 day          Drain            Closed/Suction Drain Left Perineal Bulb 8 5 Fr  1 day                    Labs:   Recent Results (from the past 24 hour(s))   CBC and differential    Collection Time: 17  4:58 AM   Result Value Ref Range    WBC 16 66 (H) 4 31 - 10 16 Thousand/uL    RBC 3 94 3 88 - 5 62 Million/uL    Hemoglobin 11 0 (L) 12 0 - 17 0 g/dL    Hematocrit 32 9 (L) 36 5 - 49 3 %    MCV 84 82 - 98 fL    MCH 27 9 26 8 - 34 3 pg    MCHC 33 4 31 4 - 37 4 g/dL    RDW 12 7 11 6 - 15 1 %    MPV 9 2 8 9 - 12 7 fL    Platelets 142 540 - 409 Thousands/uL    nRBC 0 /100 WBCs    Neutrophils Relative 71 43 - 75 %    Lymphocytes Relative 15 14 - 44 %    Monocytes Relative 12 4 - 12 %    Eosinophils Relative 2 0 - 6 %    Basophils Relative 0 0 - 1 %    Neutrophils Absolute 11 61 (H) 1 85 - 7 62 Thousands/µL    Lymphocytes Absolute 2 56 0 60 - 4 47 Thousands/µL    Monocytes Absolute 2 04 (H) 0 17 - 1 22 Thousand/µL    Eosinophils Absolute 0 36 0 00 - 0 61 Thousand/µL    Basophils Absolute 0 04 0 00 - 0 10 Thousands/µL   Comprehensive metabolic panel    Collection Time: 08/19/17  4:58 AM   Result Value Ref Range    Sodium 138 136 - 145 mmol/L    Potassium 4 3 3 5 - 5 3 mmol/L    Chloride 107 100 - 108 mmol/L    CO2 26 21 - 32 mmol/L    Anion Gap 5 4 - 13 mmol/L    BUN 7 5 - 25 mg/dL    Creatinine 0 66 0 60 - 1 30 mg/dL    Glucose 113 65 - 140 mg/dL    Calcium 8 3 8 3 - 10 1 mg/dL    AST 15 5 - 45 U/L    ALT 28 12 - 78 U/L    Alkaline Phosphatase 54 46 - 116 U/L    Total Protein 7 2 6 4 - 8 2 g/dL    Albumin 2 6 (L) 3 5 - 5 0 g/dL    Total Bilirubin 0 54 0 20 - 1 00 mg/dL    eGFR 130 ml/min/1 73sq m       Radiology Results: I have personally reviewed pertinent reports  Cta Chest Ct Abdomen Pelvis W Contrast    Result Date: 8/17/2017  Impression: 1  Suboptimal evaluation of the pulmonary arteries for reasons discussed above  No evidence of central pulmonary embolus  2   Drainage catheter within left perinephric subcapsular collection  3   Small bilateral nonobstructing renal calculi  4   Hepatomegaly and hepatic steatosis  5   No evidence of acute abnormality in the chest, abdomen or pelvis  Workstation performed: [id number]       Other Diagnostic Testing:   I have personally reviewed pertinent reports        Results from last 7 days  Lab Units 08/19/17  0458 08/18/17  0446 08/17/17  1208   SODIUM mmol/L 138 135* 135*   POTASSIUM mmol/L 4 3 3 4* 4 4   CHLORIDE mmol/L 107 103 101   CO2 mmol/L 26 24 25   BUN mg/dL 7 10 13   CREATININE mg/dL 0 66 0 84 0 95   CALCIUM mg/dL 8 3 8 5 9 4   TOTAL PROTEIN g/dL 7 2 7 2 8 5*   BILIRUBIN TOTAL mg/dL 0 54 0 58 0 65   ALK PHOS U/L 54 56 65   ALT U/L 28 35 51   AST U/L 15 17 31   GLUCOSE RANDOM mg/dL 113 133 114         Active Meds:   Current Facility-Administered Medications   Medication Dose Route Frequency    acetaminophen (TYLENOL) tablet 650 mg  650 mg Oral Q6H PRN    albuterol (PROVENTIL HFA,VENTOLIN HFA) inhaler 2 puff  2 puff Inhalation Q4H PRN    aluminum-magnesium hydroxide-simethicone (MYLANTA) 200-200-20 mg/5 mL oral suspension 30 mL  30 mL Oral Q6H PRN    cefepime (MAXIPIME) 2 g/50 mL dextrose IVPB  2,000 mg Intravenous Q12H    ibuprofen (MOTRIN) tablet 400 mg  400 mg Oral Q6H PRN    ondansetron (ZOFRAN) injection 4 mg  4 mg Intravenous Q6H PRN    oxyCODONE-acetaminophen (PERCOCET) 5-325 mg per tablet 1 tablet  1 tablet Oral Q4H PRN    sodium chloride 0 9 % infusion  125 mL/hr Intravenous Continuous         VTE Pharmacologic Prophylaxis: Reason for no pharmacologic prophylaxis patient had IR procedure performed and has history of hematuria    VTE Mechanical Prophylaxis: sequential compression device    [place] MD

## 2017-08-19 NOTE — CONSULTS
Consultation - Infectious Disease   Thania Glover 29 y o  male MRN: [id number]  Unit/Bed#: [facility] 602-01 Encounter: [id number]      IMPRESSION & RECOMMENDATIONS:   #1  SIRSconsideration for the possibility of sepsis  Possibly secondary to an occult bacterial process that is yet to be identified  Possibly secondary to hematoma  Possibly secondary to a viral infection  Possibly secondary to a drug reaction but this is less likely without a rash or other evidence  Fortunately, the patient remains hemodynamically stable and nontoxic despite his systemic illness   -Continue cefepime for now with the current dose  -Follow-up repeat CT chest abdomen and pelvis  -Follow-up blood cultures  -Monitor temperature and CBC with differential  -Monitor renal function  -No additional ID workup for now    #2  Chest and abdominal painsuspect somehow related to #1  No other clear source appreciated  Initial CT scan did not reveal anything as a causative etiology  -Monitor and treat symptoms as needed  -Await repeat CT of the chest and abdomen and pelvis  -No additional ID workup for now    #3  Perinephric hematomathe collection that has been drained has been consistent with blood, and the cultures are negative  No microbiologic evidence that this is an abscess  Certainly hematoma can become secondarily infected but to this point there is been no evidence of that happening   -Follow-up repeat CT  -Drain management  -Interventional radiology and urology follow-up    #4  Morbid obesity    Discussed in significant detail with the patient and his mother    HISTORY OF PRESENT ILLNESS:  Reason for Consult: SIRS  HPI: Thania Glover is a 29y o  year old male with a history of nephrolithiasis with a recent perinephric hematoma who I'm asked to evaluate for fever and leukocytosis  The patient has a complex recent medical history  In late July he was found to have a ureteral calculus and was managed at the [address] [place] [address]   He underwent left-sided ureteroscopy and laser lithotripsy  The postoperative course was uncomplicated  He had a left ureteral stent placed  He subsequently had the stent removed and developed a fever  He presented to [facility] initially was transferred to [address] in early August for further management  He was thought maybe to have a perinephric abscess and underwent percutaneous drainage of a bloody collection  The cultures from the collection were negative  He had been on levofloxacin and he was eventually discharged home on oral ciprofloxacin with a plan to complete 2 weeks of antibiotics  He had some low-grade fever that seemed to resolve but then on the day prior to this admission he begin having low-grade fever once again  He was sent to the ER for further evaluation  In the emergency department he was found to have fever and leukocytosis  He underwent repeat CT of the chest abdomen and pelvis that revealed the ongoing perinephric collection with a small remnant of the collection not being drained by the catheter  He had blood cultures obtained and urine cultures obtained and was started on cefepime and admitted further management  He underwent revision of his percutaneous drain with the drain being placed successfully in the area of the additional collection  In addition to the patient's fever he has been suffering from pain involving the chest that's worse with deep inspiration  He states that this pain can also involve his upper abdomen and left flank  He has had a dry cough but only with deep inspiration  He has a headache but only when he has a fever  He denies any stiff neck, denies any sore throat rhinorrhea or nasal congestion, denies any sputum production, denies any nausea vomiting or diarrhea, denies any dysuria or hematuria, denies any new rash or skin lesions, denies any new joint or muscle pains   Since yesterday his fever seems to be resolving, and his leukocytosis is improving  The patient has not traveled outside of the area, has had exposure to dogs but no other animal contacts, denies any ill contacts, 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 but no other unique exposures or hobbies  REVIEW OF SYSTEMS:  A complete 12 point system-based review of systems is otherwise negative  PAST MEDICAL HISTORY:  Past Medical History:   Diagnosis Date    Asthma     Hypertension     Nephrolithiasis      Past Surgical History:   Procedure Laterality Date    DENTAL SURGERY      TN CYSTO/URETERO W/LITHOTRIPSY &INDWELL STENT INSRT Left 2017    Procedure: CYSTOSCOPY URETEROSCOPY WITH LITHOTRIPSY HOLMIUM LASER, RETROGRADE PYELOGRAM AND INSERTION STENT URETERAL;  Surgeon: Mario Kan MD;  Location: [facility];  Service: Urology       FAMILY HISTORY:  Non-contributory    SOCIAL HISTORY:  Social History   History   Alcohol Use    Yes     Comment: OCCASIONALLY     History   Drug Use No     History   Smoking Status    Never Smoker   Smokeless Tobacco    Never Used       ALLERGIES:  Allergies   Allergen Reactions    Amoxicillin Hives    Penicillins Hives       MEDICATIONS:  All current active medications have been reviewed  Antibiotics: Cefepime No  3    PHYSICAL EXAM:  HR:  [] 90  Resp:  [18-20] 20  BP: (118-137)/(53-74) 137/74  SpO2:  [96 %-98 %] 98 %  Temp (24hrs), Av 2 °F (37 3 °C), Min:98 3 °F (36 8 °C), Max:100 1 °F (37 8 °C)  Current: Temperature: 99 3 °F (37 4 °C)    Intake/Output Summary (Last 24 hours) at 17 1349  Last data filed at 17 1100   Gross per 24 hour   Intake          5871 24 ml   Output             1245 ml   Net          4626 24 ml       General Appearance:  Appearing well, nontoxic, and in no distress  Sitting in a chair and eating lunch      Head:  Normocephalic, without obvious abnormality, atraumatic   Eyes:  Conjunctiva pink and sclera anicteric, both eyes   Nose: Nares normal, mucosa normal, no drainage Throat: Oropharynx dry without lesions   Neck: Supple, symmetrical, no adenopathy, no tenderness/mass/nodules   Back:   Symmetric, no curvature, ROM normal, no CVA tenderness   Lungs:   Clear to auscultation bilaterally, no audible wheezes, rhonchi and rales, respirations unlabored   Chest Wall:  No tenderness or deformity   Heart:  RRR; no murmur, rub or gallop   Abdomen:   Soft, non-tender, non-distended, positive bowel sounds,    Extremities: No cyanosis, clubbing or edema   Skin: No rashes or lesions  No draining wounds noted  Lymph nodes: Cervical, supraclavicular nodes normal   Neurologic: Alert and oriented times 3, extremity strength 5/5 and symmetric       LABS, IMAGING, & OTHER STUDIES:  Lab Results:  I have personally reviewed pertinent labs  Results from last 7 days  Lab Units 08/19/17  0458 08/18/17  0446 08/17/17  1208   WBC Thousand/uL 16 66* 18 11* 23 08*   HEMOGLOBIN g/dL 11 0* 12 1 13 6   PLATELETS Thousands/uL 367 402* 475*       Results from last 7 days  Lab Units 08/19/17  0458 08/18/17  0446 08/17/17  1208   SODIUM mmol/L 138 135* 135*   POTASSIUM mmol/L 4 3 3 4* 4 4   CHLORIDE mmol/L 107 103 101   CO2 mmol/L 26 24 25   ANION GAP mmol/L 5 8 9   BUN mg/dL 7 10 13   CREATININE mg/dL 0 66 0 84 0 95   EGFR ml/min/1 73sq m 130 117 107   GLUCOSE RANDOM mg/dL 113 133 114   CALCIUM mg/dL 8 3 8 5 9 4   AST U/L 15 17 31   ALT U/L 28 35 51   ALK PHOS U/L 54 56 65   TOTAL PROTEIN g/dL 7 2 7 2 8 5*   ALBUMIN g/dL 2 6* 2 7* 3 5   BILIRUBIN TOTAL mg/dL 0 54 0 58 0 65       Results from last 7 days  Lab Units 08/17/17  1208   BLOOD CULTURE  No Growth at 24 hrs  No Growth at 24 hrs  Urinalysis normal    Imaging Studies:   I have personally reviewed pertinent imaging study reports and images in PACS  CT chest abdomen and pelvis1   Suboptimal evaluation of the pulmonary arteries for reasons discussed above   No evidence of central pulmonary embolus    2   Drainage catheter within left perinephric subcapsular collection  3   Small bilateral nonobstructing renal calculi  4   Hepatomegaly and hepatic steatosis  5   No evidence of acute abnormality in the chest, abdomen or pelvis

## 2017-08-19 NOTE — PROGRESS NOTES
Spoke with Lili Mirza MD from SOD team   Per Doctor Cardio Pt NOT to drink oral dye and only to receive IV dye

## 2017-08-20 LAB
ANION GAP SERPL CALCULATED.3IONS-SCNC: 6 MMOL/L (ref 4–13)
BASOPHILS # BLD AUTO: 0.05 THOUSANDS/ΜL (ref 0–0.1)
BASOPHILS NFR BLD AUTO: 1 % (ref 0–1)
BUN SERPL-MCNC: 8 MG/DL (ref 5–25)
CALCIUM SERPL-MCNC: 8.9 MG/DL (ref 8.3–10.1)
CHLORIDE SERPL-SCNC: 103 MMOL/L (ref 100–108)
CO2 SERPL-SCNC: 26 MMOL/L (ref 21–32)
CREAT SERPL-MCNC: 0.66 MG/DL (ref 0.6–1.3)
EOSINOPHIL # BLD AUTO: 0.46 THOUSAND/ΜL (ref 0–0.61)
EOSINOPHIL NFR BLD AUTO: 5 % (ref 0–6)
ERYTHROCYTE [DISTWIDTH] IN BLOOD BY AUTOMATED COUNT: 12.7 % (ref 11.6–15.1)
GFR SERPL CREATININE-BSD FRML MDRD: 130 ML/MIN/1.73SQ M
GLUCOSE SERPL-MCNC: 98 MG/DL (ref 65–140)
HCT VFR BLD AUTO: 34.6 % (ref 36.5–49.3)
HGB BLD-MCNC: 11.4 G/DL (ref 12–17)
LYMPHOCYTES # BLD AUTO: 2.83 THOUSANDS/ΜL (ref 0.6–4.47)
LYMPHOCYTES NFR BLD AUTO: 28 % (ref 14–44)
MCH RBC QN AUTO: 27.9 PG (ref 26.8–34.3)
MCHC RBC AUTO-ENTMCNC: 32.9 G/DL (ref 31.4–37.4)
MCV RBC AUTO: 85 FL (ref 82–98)
MONOCYTES # BLD AUTO: 1.28 THOUSAND/ΜL (ref 0.17–1.22)
MONOCYTES NFR BLD AUTO: 13 % (ref 4–12)
NEUTROPHILS # BLD AUTO: 5.47 THOUSANDS/ΜL (ref 1.85–7.62)
NEUTS SEG NFR BLD AUTO: 53 % (ref 43–75)
NRBC BLD AUTO-RTO: 0 /100 WBCS
PLATELET # BLD AUTO: 470 THOUSANDS/UL (ref 149–390)
PMV BLD AUTO: 9.8 FL (ref 8.9–12.7)
POTASSIUM SERPL-SCNC: 4.3 MMOL/L (ref 3.5–5.3)
RBC # BLD AUTO: 4.09 MILLION/UL (ref 3.88–5.62)
SODIUM SERPL-SCNC: 135 MMOL/L (ref 136–145)
WBC # BLD AUTO: 10.14 THOUSAND/UL (ref 4.31–10.16)

## 2017-08-20 PROCEDURE — 85025 COMPLETE CBC W/AUTO DIFF WBC: CPT | Performed by: INTERNAL MEDICINE

## 2017-08-20 PROCEDURE — 80048 BASIC METABOLIC PNL TOTAL CA: CPT | Performed by: INTERNAL MEDICINE

## 2017-08-20 RX ORDER — SIMETHICONE 80 MG
80 TABLET,CHEWABLE ORAL ONCE
Status: COMPLETED | OUTPATIENT
Start: 2017-08-20 | End: 2017-08-20

## 2017-08-20 RX ORDER — OXYCODONE HYDROCHLORIDE 5 MG/1
2.5 TABLET ORAL EVERY 6 HOURS PRN
Status: DISCONTINUED | OUTPATIENT
Start: 2017-08-20 | End: 2017-08-22 | Stop reason: HOSPADM

## 2017-08-20 RX ADMIN — ENOXAPARIN SODIUM 40 MG: 40 INJECTION SUBCUTANEOUS at 10:30

## 2017-08-20 RX ADMIN — Medication 1 TABLET: at 17:40

## 2017-08-20 RX ADMIN — SIMETHICONE CHEW TAB 80 MG 80 MG: 80 TABLET ORAL at 10:37

## 2017-08-20 RX ADMIN — CEFEPIME 2000 MG: 2 INJECTION, POWDER, FOR SOLUTION INTRAMUSCULAR; INTRAVENOUS at 03:50

## 2017-08-20 RX ADMIN — OXYCODONE HYDROCHLORIDE AND ACETAMINOPHEN 1 TABLET: 5; 325 TABLET ORAL at 03:56

## 2017-08-20 RX ADMIN — Medication 1 TABLET: at 08:06

## 2017-08-20 RX ADMIN — IBUPROFEN 400 MG: 400 TABLET, FILM COATED ORAL at 17:40

## 2017-08-20 NOTE — PROGRESS NOTES
Progress Note - Infectious Disease   Ethel Andrews 29 y o  male MRN: [id number]  Unit/Bed#: [facility] 602-01 Encounter: [id number]      Impression/Plan:  #1  SIRSNo infectious etiology has been found  Possibly secondary to hematoma  Possibly secondary to a viral infection  Possibly secondary to a drug reaction but this is less likely without a rash or other evidence  Fortunately, the patient remains hemodynamically stable and nontoxic despite his systemic illness  Fever and leukocytosis have resolved  -Discontinue cefepime  -Monitor off all antibiotics and allow any occult infection to declare  -Follow-up blood cultures  -Monitor temperature and CBC with differential  -Monitor renal function  -No additional ID workup for now     #2  Chest and abdominal painsuspect somehow related to #1  No other clear source appreciated  Initial CT scan did not reveal anything as a causative etiology  Follow-up CT scan remained stable  -Monitor symptoms  -Pain management  -No additional ID workup for now  -Monitor off antibiotics as above     #3  Perinephric hematomathe collection that has been drained has been consistent with blood, and the cultures are negative  No microbiologic evidence that this is an abscess  Certainly hematoma can become secondarily infected but to this point there is been no evidence of that happening   -Drain management  -Interventional radiology and urology follow-up  -Discontinue antibiotics as above     #4  Morbid obesity     Discussed in significant detail with the patient and his mother    Antibiotics:  Cefepime No  4    Subjective:  Patient has no fever, chills, sweats; no nausea, vomiting, diarrhea; no cough, shortness of breath; still having some chest abdomen back pain  No new symptoms  He reports no drainage in the GREGORIO drain      Objective:  Vitals:  HR:  [69-90] 69  Resp:  [18-20] 18  BP: (110-130)/(66-67) 110/67  SpO2:  [96 %-98 %] 98 %  Temp (24hrs), Av 7 °F (37 1 °C), Min:98 2 °F (36 8 °C), Max:99 1 °F (37 3 °C)  Current: Temperature: 98 2 °F (36 8 °C)    Physical Exam:   General Appearance:  Alert, nontoxic, no acute distress  Throat: Oropharynx moist without lesions  Lungs:   Clear to auscultation bilaterally; respirations unlabored   Heart:  RRR; no murmur, rub or gallop   Abdomen:   Soft, non-tender, non-distended, positive bowel sounds  Left percutaneous nephrostomy drain in place  Extremities: No clubbing, cyanosis or edema   Skin: No new rashes or lesions  No draining wounds noted  Labs, Imaging, & Other studies:   All pertinent labs and imaging studies were personally reviewed    Results from last 7 days  Lab Units 08/20/17  0543 08/19/17  0458 08/18/17  0446   WBC Thousand/uL 10 14 16 66* 18 11*   HEMOGLOBIN g/dL 11 4* 11 0* 12 1   PLATELETS Thousands/uL 470* 367 402*       Results from last 7 days  Lab Units 08/20/17  0543 08/19/17  0458 08/18/17  0446 08/17/17  1208   SODIUM mmol/L 135* 138 135* 135*   POTASSIUM mmol/L 4 3 4 3 3 4* 4 4   CHLORIDE mmol/L 103 107 103 101   CO2 mmol/L 26 26 24 25   ANION GAP mmol/L 6 5 8 9   BUN mg/dL 8 7 10 13   CREATININE mg/dL 0 66 0 66 0 84 0 95   EGFR ml/min/1 73sq m 130 130 117 107   GLUCOSE RANDOM mg/dL 98 113 133 114   CALCIUM mg/dL 8 9 8 3 8 5 9 4   AST U/L  --  15 17 31   ALT U/L  --  28 35 51   ALK PHOS U/L  --  54 56 65   TOTAL PROTEIN g/dL  --  7 2 7 2 8 5*   ALBUMIN g/dL  --  2 6* 2 7* 3 5   BILIRUBIN TOTAL mg/dL  --  0 54 0 58 0 65       Results from last 7 days  Lab Units 08/17/17  1208   BLOOD CULTURE  No Growth at 48 hrs  No Growth at 48 hrs

## 2017-08-20 NOTE — PROGRESS NOTES
IM Residency Progress Note   Unit/Bed#: [facility] 602-01 Encounter: [id number]  SOD Team B       Pineda Morales 29 y o  male [id number]    Hospital Stay Days: 3      Assessment/Plan:    Principal Problem:    Severe sepsis  Active Problems:    Asthma    Morbid obesity    Perinephric abscess    Nephrolithiasis    1  Sepsis  -Presented with Leukocytosis at 23, temperature of 100 2, lactate of 2 3, with abdominal source  -Was considered likely secondary to left perinephric abscess and GREGORIO drain  -Was given one time dose of Vancomycin and started on IV Cefipime [Day 2]   -Urinalysis was negative, with just some trace RBC, urine culture was not ordered  -Repeat lactate was 1 8 and 1 2 Stopped trending  -Repeat CBC revealed leukocytosis down to 10    -IR performed a check of his tube and repositioned the GREGORIO drain and instructed patient to no longer flush the tube and to return in one week for possible tube removal    -Urology [Dr Shaan Madsen saw the patient and recommended the continuation of his IV antibiotics and to follow cultures  Patient will be stable to discharge once afebrile for 24 hours  Plan for outpatient drain removal  Recommendations greatly appreciated  -Patient on Normal Saline at a rate of 90 ml/hour will discontinue as patient has good PO fluid intake   -Continue Zofran 4mg IV Q6H PRN for nausea and Mylanta for indigestion  Will give Gas-X  -Acetaminophen and Ibuprofen for fevers as needed  -Blood cultures x 2 negative at 48 hours, Repeat Urinalysis was also negative  -Repeat CT Scan revealed mild improvement in inflammatory changes in left kidney    -Infectious Disease saw patient, recommendations greatly appreciated  Have discontinued antibiotics for now, will monitor clinically  -Monitor temperature and CBC with differential       2  Left Perinephric Abscess  -Patient still having pain at the GREGORIO drain site, very little serosanguinous fluid   Will discuss with IR tomorrow about recommendations for removal   -Acetaminophen or Ibuprofen PRN for pain  -Stopped PRN Dilaudid as was making patient too somnolent     3  Asthma   -Patient complaining of some chest tightness with breathing, patient normally at home takes herbal supplements "Clear Lungs " Discussed with patient following up with PCP or Pulmonologist as outpatient to manage asthma long term  -Will provide PRN Albuterol and patient has peak flow which he is encouraged to use  Encourage to ambulate     4  Morbid Obesity   -BMI is 36    -Will provide dietary and lifestyle modifications    -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 obesity       5  Hypokalemia   -Stable at 4 3   -Will continue to monitor     6  Hyponatremia  -Stable at 135  -Encourage PO intake    Disposition: Continuing monitoring clinically  Will plan for discharge when medically stable  Subjective:   Mr Bobo Solomon is laying in bed  He is complaining of left sided flank pain at the site of the GREGORIO drain which is improving  He denies chills, headaches  He complains of chest pain with breathing which is not new and not worsening  Patient says he is using the peak flow which is helping and that he will walk around to improve breathing  Mother is room as per usual, and she wants to know if the GREGORIO drain can be removed if it is not draining  Vitals: Temp (24hrs), Av 7 °F (37 1 °C), Min:98 2 °F (36 8 °C), Max:99 1 °F (37 3 °C)  Current: Temperature: 98 2 °F (36 8 °C)  Vitals:    17 0805 17 1541 17 2329 17 0800   BP: 137/74 123/67 130/66 110/67   Pulse: 90 90 88 69   Resp: 20 20 20 18   Temp: 99 3 °F (37 4 °C) 98 7 °F (37 1 °C) 99 1 °F (37 3 °C) 98 2 °F (36 8 °C)   TempSrc:  Oral Oral    SpO2: 98% 96% 98% 98%   Weight:       Height:        Body mass index is 35 79 kg/m²  I/O last 24 hours: In: 2435 [P  O :600;  I V :1555; IV Piggyback:50]  Out:  [Urine:; Drains:22]      Physical Exam: /67   Pulse 69   Temp 98 2 °F (36 8 °C)   Resp 18   Ht 6' 3" (1 905 m)   Wt 130 kg (286 lb 6 oz)   SpO2 98%   BMI 35 79 kg/m²   General appearance: alert, appears stated age, cooperative and morbidly obese  Head: Normocephalic, without obvious abnormality, atraumatic  Neck: no JVD  Lungs: clear to auscultation bilaterally  Heart: regular rate and rhythm, S1, S2 normal, no murmur, click, rub or gallop  Abdomen: normal findings: bowel sounds normal and abnormal findings:  obese and mild tenderness in the upper abdomen and in the left flank  Extremities: extremities normal, atraumatic, no cyanosis or edema  Pulses: 2+ and symmetric  Neurologic: Grossly normal     Invasive Devices     Peripheral Intravenous Line            Peripheral IV 08/17/17 Left Antecubital 2 days          Drain            Closed/Suction Drain Left Perineal Bulb 8 5 Fr  2 days                    Labs:   Recent Results (from the past 24 hour(s))   UA w Reflex to Microscopic w Reflex to Culture    Collection Time: 08/19/17  1:59 PM   Result Value Ref Range    Color, UA Yellow     Clarity, UA Clear     Specific Gravity, UA >1 045 (H) 1 003 - 1 030    pH, UA 5 5 4 5 - 8 0    Leukocytes, UA Negative Negative    Nitrite, UA Negative Negative    Protein, UA Negative Negative mg/dl    Glucose, UA Negative Negative mg/dl    Ketones, UA Negative Negative mg/dl    Urobilinogen, UA 0 2 0 2, 1 0 E U /dl E U /dl    Bilirubin, UA Negative Negative    Blood, UA Negative Negative   CBC and differential    Collection Time: 08/20/17  5:43 AM   Result Value Ref Range    WBC 10 14 4 31 - 10 16 Thousand/uL    RBC 4 09 3 88 - 5 62 Million/uL    Hemoglobin 11 4 (L) 12 0 - 17 0 g/dL    Hematocrit 34 6 (L) 36 5 - 49 3 %    MCV 85 82 - 98 fL    MCH 27 9 26 8 - 34 3 pg    MCHC 32 9 31 4 - 37 4 g/dL    RDW 12 7 11 6 - 15 1 %    MPV 9 8 8 9 - 12 7 fL    Platelets 313 (H) 378 - 390 Thousands/uL    nRBC 0 /100 WBCs    Neutrophils Relative 53 43 - 75 %    Lymphocytes Relative 28 14 - 44 %    Monocytes Relative 13 (H) 4 - 12 % Eosinophils Relative 5 0 - 6 %    Basophils Relative 1 0 - 1 %    Neutrophils Absolute 5 47 1 85 - 7 62 Thousands/µL    Lymphocytes Absolute 2 83 0 60 - 4 47 Thousands/µL    Monocytes Absolute 1 28 (H) 0 17 - 1 22 Thousand/µL    Eosinophils Absolute 0 46 0 00 - 0 61 Thousand/µL    Basophils Absolute 0 05 0 00 - 0 10 Thousands/µL   Basic metabolic panel    Collection Time: 08/20/17  5:43 AM   Result Value Ref Range    Sodium 135 (L) 136 - 145 mmol/L    Potassium 4 3 3 5 - 5 3 mmol/L    Chloride 103 100 - 108 mmol/L    CO2 26 21 - 32 mmol/L    Anion Gap 6 4 - 13 mmol/L    BUN 8 5 - 25 mg/dL    Creatinine 0 66 0 60 - 1 30 mg/dL    Glucose 98 65 - 140 mg/dL    Calcium 8 9 8 3 - 10 1 mg/dL    eGFR 130 ml/min/1 73sq m       Radiology Results: I have personally reviewed pertinent reports  Ir Tube Check    Result Date: 8/18/2017  Impression: Impression: Existing catheter is positioned eccentric to very small residual medial collection and was successfully exchanged and repositioned  There was no fluid return from this residual collection and output with irrigation was serosanguineous  Although this collection has been labeled "abscess", original cultures were negative  All signs indicate that this is likely an evolving hematoma and therefore unlikely source of fever and leukocytosis  Lack of significant decrease on serial CT is also consistent with evolving hematoma  Plan: He was instructed to stop catheter flushing  Follow-up tube check has been arranged next week with hopeful tube removal  The procedure, findings, and follow-up were discussed with the patient's mother in detail Workstation performed: [id number]     Ct Chest And Abdomen W Contrast    Result Date: 8/19/2017  Impression: 1  Minimal left basilar subsegmental atelectasis  Lungs otherwise clear  2   Stable position of left perinephric percutaneous drainage catheter with stable enhancing subcapsular collection    Inflammatory changes in the adjacent lateral conal and Gerota's fascia have mildly improved  3   Enlarged, fatty liver  4   Stable left para-aortic lymph nodes, likely reactive  Workstation performed: [id number]     Cta Chest Ct Abdomen Pelvis W Contrast    Result Date: 8/17/2017  Impression: 1  Suboptimal evaluation of the pulmonary arteries for reasons discussed above  No evidence of central pulmonary embolus  2   Drainage catheter within left perinephric subcapsular collection  3   Small bilateral nonobstructing renal calculi  4   Hepatomegaly and hepatic steatosis  5   No evidence of acute abnormality in the chest, abdomen or pelvis  Workstation performed: [id number]       Other Diagnostic Testing:   I have personally reviewed pertinent reports          Active Meds:   Current Facility-Administered Medications   Medication Dose Route Frequency    acetaminophen (TYLENOL) tablet 650 mg  650 mg Oral Q6H PRN    albuterol (PROVENTIL HFA,VENTOLIN HFA) inhaler 2 puff  2 puff Inhalation Q4H PRN    aluminum-magnesium hydroxide-simethicone (MYLANTA) 200-200-20 mg/5 mL oral suspension 30 mL  30 mL Oral Q6H PRN    cefepime (MAXIPIME) 2 g/50 mL dextrose IVPB  2,000 mg Intravenous Q12H    ibuprofen (MOTRIN) tablet 400 mg  400 mg Oral Q6H PRN    ondansetron (ZOFRAN) injection 4 mg  4 mg Intravenous Q6H PRN    oxyCODONE-acetaminophen (PERCOCET) 5-325 mg per tablet 1 tablet  1 tablet Oral Q4H PRN    senna-docusate sodium (SENOKOT S) 8 6-50 mg per tablet 1 tablet  1 tablet Oral BID    sodium chloride 0 9 % infusion  90 mL/hr Intravenous Continuous     VTE Pharmacologic Prophylaxis: Enoxaparin (Lovenox)  VTE Mechanical Prophylaxis: sequential compression device    Nhi Childs MD  PGY-1

## 2017-08-20 NOTE — PROGRESS NOTES
Patient vital signs are stable, lungs sounds are clear and decreased patient c/o pain 6 out of 10  Pain medication given  Will continue to monitor     La Adams RN

## 2017-08-21 LAB
ALBUMIN SERPL BCP-MCNC: 2.9 G/DL (ref 3.5–5)
ALP SERPL-CCNC: 65 U/L (ref 46–116)
ALT SERPL W P-5'-P-CCNC: 42 U/L (ref 12–78)
ANION GAP SERPL CALCULATED.3IONS-SCNC: 7 MMOL/L (ref 4–13)
AST SERPL W P-5'-P-CCNC: 32 U/L (ref 5–45)
BASOPHILS # BLD AUTO: 0.07 THOUSANDS/ΜL (ref 0–0.1)
BASOPHILS NFR BLD AUTO: 1 % (ref 0–1)
BILIRUB SERPL-MCNC: 0.39 MG/DL (ref 0.2–1)
BUN SERPL-MCNC: 8 MG/DL (ref 5–25)
CALCIUM SERPL-MCNC: 9.5 MG/DL (ref 8.3–10.1)
CHLORIDE SERPL-SCNC: 102 MMOL/L (ref 100–108)
CO2 SERPL-SCNC: 27 MMOL/L (ref 21–32)
CREAT SERPL-MCNC: 0.61 MG/DL (ref 0.6–1.3)
EOSINOPHIL # BLD AUTO: 0.45 THOUSAND/ΜL (ref 0–0.61)
EOSINOPHIL NFR BLD AUTO: 5 % (ref 0–6)
ERYTHROCYTE [DISTWIDTH] IN BLOOD BY AUTOMATED COUNT: 12.5 % (ref 11.6–15.1)
GFR SERPL CREATININE-BSD FRML MDRD: 134 ML/MIN/1.73SQ M
GLUCOSE SERPL-MCNC: 88 MG/DL (ref 65–140)
HCT VFR BLD AUTO: 34.8 % (ref 36.5–49.3)
HGB BLD-MCNC: 11.6 G/DL (ref 12–17)
LYMPHOCYTES # BLD AUTO: 2.97 THOUSANDS/ΜL (ref 0.6–4.47)
LYMPHOCYTES NFR BLD AUTO: 32 % (ref 14–44)
MCH RBC QN AUTO: 28 PG (ref 26.8–34.3)
MCHC RBC AUTO-ENTMCNC: 33.3 G/DL (ref 31.4–37.4)
MCV RBC AUTO: 84 FL (ref 82–98)
MONOCYTES # BLD AUTO: 0.87 THOUSAND/ΜL (ref 0.17–1.22)
MONOCYTES NFR BLD AUTO: 9 % (ref 4–12)
NEUTROPHILS # BLD AUTO: 4.93 THOUSANDS/ΜL (ref 1.85–7.62)
NEUTS SEG NFR BLD AUTO: 53 % (ref 43–75)
NRBC BLD AUTO-RTO: 0 /100 WBCS
PLATELET # BLD AUTO: 540 THOUSANDS/UL (ref 149–390)
PMV BLD AUTO: 9.4 FL (ref 8.9–12.7)
POTASSIUM SERPL-SCNC: 4.1 MMOL/L (ref 3.5–5.3)
PROT SERPL-MCNC: 8 G/DL (ref 6.4–8.2)
RBC # BLD AUTO: 4.14 MILLION/UL (ref 3.88–5.62)
SODIUM SERPL-SCNC: 136 MMOL/L (ref 136–145)
WBC # BLD AUTO: 9.32 THOUSAND/UL (ref 4.31–10.16)

## 2017-08-21 PROCEDURE — 80053 COMPREHEN METABOLIC PANEL: CPT | Performed by: INTERNAL MEDICINE

## 2017-08-21 PROCEDURE — 85025 COMPLETE CBC W/AUTO DIFF WBC: CPT | Performed by: INTERNAL MEDICINE

## 2017-08-21 RX ADMIN — ENOXAPARIN SODIUM 40 MG: 40 INJECTION SUBCUTANEOUS at 08:54

## 2017-08-21 RX ADMIN — Medication 1 TABLET: at 17:33

## 2017-08-21 RX ADMIN — IBUPROFEN 400 MG: 400 TABLET, FILM COATED ORAL at 00:56

## 2017-08-21 RX ADMIN — Medication 1 TABLET: at 08:54

## 2017-08-21 NOTE — PROGRESS NOTES
Progress Note - Infectious Disease   Kem Robledo 29 y o  male MRN: [id number]  Unit/Bed#: [facility] 602-01 Encounter: [id number]      Impression/Plan:  #1  SIRSNo infectious etiology has been found  Possibly secondary to hematoma  Possibly secondary to a viral infection  Possibly secondary to a drug reaction but this is less likely without a rash or other evidence  Fortunately, the patient remains hemodynamically stable and nontoxic despite his systemic illness  Fever and leukocytosis have resolved  He remained stable off all antibiotics  -Monitor off all antibiotics and allow any occult infection to declare  -Follow-up blood cultures  -Monitor temperature and CBC with differential  -Monitor renal function  -No additional ID workup for now     #2  Chest and abdominal painsuspect somehow related to #1  No other clear source appreciated  Initial CT scan did not reveal anything as a causative etiology  Follow-up CT scan remained stable  -Monitor symptoms  -Pain management  -No additional ID workup for now  -Monitor off antibiotics as above     #3  Perinephric hematomathe collection that has been drained has been consistent with blood, and the cultures are negative  No microbiologic evidence that this is an abscess  Certainly hematoma can become secondarily infected but to this point there is been no evidence of that happening   -Drain management  -Interventional radiology and urology follow-up  -No additional antibiotics  -Possibly remove GREGORIO drain     #4  Morbid obesity     Discussed in significant detail with the patient and his mother  Also discussed in detail with the primary service  Antibiotics:  None    Subjective:  Patient has no fever, chills, sweats; no nausea, vomiting, diarrhea; no cough, shortness of breath; some low back pain  No new symptoms  He is anxious to get his GREGORIO drain out      Objective:  Vitals:  HR:  [75-81] (P) 81  Resp:  [18-20] (P) 20  BP: (139-155)/(72-82) (P) 138/79  SpO2:  [96 %-99 %] (P) 99 %  Temp (24hrs), Av 1 °F (36 7 °C), Min:98 °F (36 7 °C), Max:98 3 °F (36 8 °C)  Current: Temperature: (P) 98 °F (36 7 °C)    Physical Exam:   General Appearance:  Alert, nontoxic, no acute distress  Throat: Oropharynx moist without lesions  Lungs:   Clear to auscultation bilaterally; respirations unlabored   Heart:  RRR; no murmur, rub or gallop   Abdomen:   Soft, non-tender, non-distended, positive bowel sounds  Back with GREGORIO drain in place with minimal drainage  Extremities: No clubbing, cyanosis or edema   Skin: No new rashes or lesions  No draining wounds noted  Labs, Imaging, & Other studies:   All pertinent labs and imaging studies were personally reviewed    Results from last 7 days  Lab Units 17  0506 17  0543 17  0458   WBC Thousand/uL 9 32 10 14 16 66*   HEMOGLOBIN g/dL 11 6* 11 4* 11 0*   PLATELETS Thousands/uL 540* 470* 367       Results from last 7 days  Lab Units 17  0506 17  0543 17  0458 17  0446   SODIUM mmol/L 136 135* 138 135*   POTASSIUM mmol/L 4 1 4 3 4 3 3 4*   CHLORIDE mmol/L 102 103 107 103   CO2 mmol/L 27 26 26 24   ANION GAP mmol/L 7 6 5 8   BUN mg/dL 8 8 7 10   CREATININE mg/dL 0 61 0 66 0 66 0 84   EGFR ml/min/1 73sq m 134 130 130 117   GLUCOSE RANDOM mg/dL 88 98 113 133   CALCIUM mg/dL 9 5 8 9 8 3 8 5   AST U/L 32  --  15 17   ALT U/L 42  --  28 35   ALK PHOS U/L 65  --  54 56   TOTAL PROTEIN g/dL 8 0  --  7 2 7 2   ALBUMIN g/dL 2 9*  --  2 6* 2 7*   BILIRUBIN TOTAL mg/dL 0 39  --  0 54 0 58       Results from last 7 days  Lab Units 17  1208   BLOOD CULTURE  No Growth at 72 hrs  No Growth at 72 hrs

## 2017-08-21 NOTE — CASE MANAGEMENT
Continued Stay Review    Date: 8/21/2017    Vital Signs: BP (P) 138/79   Pulse (P) 81   Temp (P) 98 °F (36 7 °C)   Resp (P) 20   Ht 6' 3" (1 905 m)   Wt 130 kg (286 lb 6 oz)   SpO2 (P) 99%   BMI 35 79 kg/m²   T max 100 1 on 8/19 @ 00:34 - 99 1 on 8/19 @ 23:29  Medications:   Scheduled Meds:   enoxaparin 40 mg Subcutaneous Q24H [address]   senna-docusate sodium 1 tablet Oral BID     Continuous Infusions:    PRN Meds:   acetaminophen    albuterol    aluminum-magnesium hydroxide-simethicone    Ibuprofen po 8/20 x 1 - 8/21 x 1     Ondansetron - iv 8/19 x1     oxyCODONE  Po 8/19 x 4 - 8/20 x 1     Nursing Orders - VS q 4-0 up with assistance - Venodynes to le's- Diet regular house -     Abnormal Labs/Diagnostic Results:  Lab Units 08/21/17  0506 08/20/17  0543 08/19/17  0458   WBC Thousand/uL 9 32 10 14 16 66*   HEMOGLOBIN g/dL 11 6* 11 4* 11 0*   PLATELETS Thousands/uL 540* 470* 367         Lab Units 08/21/17  0506 08/20/17  0543 08/19/17  0458 08/18/17  0446   SODIUM mmol/L 136 135* 138 135*   POTASSIUM mmol/L 4 1 4 3 4 3 3 4*   CHLORIDE mmol/L 102 103 107 103   CO2 mmol/L 27 26 26 24   ANION GAP mmol/L 7 6 5 8   BUN mg/dL 8 8 7 10   CREATININE mg/dL 0 61 0 66 0 66 0 84   EGFR ml/min/1 73sq m 134 130 130 117   GLUCOSE RANDOM mg/dL 88 98 113 133   CALCIUM mg/dL 9 5 8 9 8 3 8 5   AST U/L 32  --  15 17   ALT U/L 42  --  28 35   ALK PHOS U/L 65  --  54 56   TOTAL PROTEIN g/dL 8 0  --  7 2 7 2   ALBUMIN g/dL 2 9*  --  2 6* 2 7*   BILIRUBIN TOTAL mg/dL 0 39  --  0 54 0 58         Lab Units 08/17/17  1208   BLOOD CULTURE   No Growth at 72 hrs  No Growth at 72 hrs  Age/Sex: 29 y o  male     Assessment/Plan:  8/20 progress note   1   Sepsis  -Presented with Leukocytosis at 23, temperature of 100 2, lactate of 2 3, with abdominal source  -Was considered likely secondary to left perinephric abscess and GREGORIO drain  -Was given one time dose of Vancomycin and started on IV Cefipime [Day 2]   -Urinalysis was negative, with just some trace RBC, urine culture was not ordered  -Repeat lactate was 1 8 and 1 2 Stopped trending  -Repeat CBC revealed leukocytosis down to 10    -IR performed a check of his tube and repositioned the GREGORIO drain and instructed patient to no longer flush the tube and to return in one week for possible tube removal    -Urology [Dr Martha Warner saw the patient and recommended the continuation of his IV antibiotics and to follow cultures  Patient will be stable to discharge once afebrile for 24 hours  Plan for outpatient drain removal  Recommendations greatly appreciated  -Patient on Normal Saline at a rate of 90 ml/hour will discontinue as patient has good PO fluid intake   -Continue Zofran 4mg IV Q6H PRN for nausea and Mylanta for indigestion  Will give Gas-X  -Acetaminophen and Ibuprofen for fevers as needed  -Blood cultures x 2 negative at 48 hours, Repeat Urinalysis was also negative  -Repeat CT Scan revealed mild improvement in inflammatory changes in left kidney    -Infectious Disease saw patient, recommendations greatly appreciated  Have discontinued antibiotics for now, will monitor clinically  -Monitor temperature and CBC with differential       2  Left Perinephric Abscess  -Patient still having pain at the GREGORIO drain site, very little serosanguinous fluid  Will discuss with IR tomorrow about recommendations for removal   -Acetaminophen or Ibuprofen PRN for pain  -Stopped PRN Dilaudid as was making patient too somnolent     3  Asthma   -Patient complaining of some chest tightness with breathing, patient normally at home takes herbal supplements "Clear Lungs " Discussed with patient following up with PCP or Pulmonologist as outpatient to manage asthma long term  -Will provide PRN Albuterol and patient has peak flow which he is encouraged to use  Encourage to ambulate     4   Morbid Obesity   -BMI is 36    -Will provide dietary and lifestyle modifications    -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 obesity       5  Hypokalemia   -Stable at 4 3   -Will continue to monitor      6   Hyponatremia  -Stable at 135  -Encourage PO intake    Discharge Plan: CM following for needs

## 2017-08-21 NOTE — PROGRESS NOTES
IM Residency Progress Note   Unit/Bed#: [facility] 602-01 Encounter: [id number]  SOD Team B       Vasiliy Veloz 29 y o  male [id number]    Hospital Stay Days: 4      Assessment/Plan:    Principal Problem:    Severe sepsis  Active Problems:    Asthma    Morbid obesity    Perinephric abscess    Nephrolithiasis    1  Sepsis  -Presented with Leukocytosis at 23, temperature of 100 2, lactate of 2 3, with abdominal source  -Was considered likely secondary to left perinephric abscess and GREGORIO drain  -Was given one time dose of Vancomycin and started on IV Cefipime [Day 2]   -Urinalysis was negative, with just some trace RBC, urine culture was not ordered  -Repeat lactate was 1 8 and 1 2 Stopped trending  -Repeat CBC revealed leukocytosis down to 9  -IR performed a check of his tube and repositioned the GREGORIO drain and instructed patient to no longer flush the tube and to return in one week for possible tube removal    -Urology [Dr Iggy Buchanan saw the patient and recommended the continuation of his IV antibiotics and to follow cultures  Patient will be stable to discharge once afebrile for 24 hours  Plan for outpatient drain removal  Recommendations greatly appreciated  -IV fluids have been discontinued   -Continue Zofran 4mg IV Q6H PRN for nausea and Mylanta for indigestion  Will give Gas-X  -Acetaminophen and Ibuprofen for fevers as needed  -Blood cultures x 2 negative after 4 days, Repeat Urinalysis was also negative  -Repeat CT Scan revealed mild improvement in inflammatory changes in left kidney    -Infectious Disease saw patient, recommendations greatly appreciated  Have discontinued antibiotics for now, will monitor clinically  Dr Edwin Neff discussed with patient and mother at length     -Monitor temperature and CBC with differential    -If clinically stable and GREGORIO drain need reassessed by IR; will consider discharge tomorrow      2  Left Perinephric Abscess  -Patient still having pain at the GREGORIO drain site, very little serosanguinous fluid    -Consulted Interventional Radiology, recommendations greatly appreciated  Will reassess need for repositioning, fistula, or if can remove  -Acetaminophen and Ibuprofen PRN for pain    3  Asthma   -Patient complaining of some chest tightness with breathing, patient normally at home takes herbal supplements "Clear Lungs " Discussed with patient following up with PCP or Pulmonologist as outpatient to manage asthma long term  -Will provide PRN Albuterol and patient has peak flow which he is encouraged to use  Encourage to ambulate     4  Morbid Obesity   -BMI is 36    -Will provide dietary and lifestyle modifications    -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 obesity       5  Hypokalemia   -Stable at 4 1  -Will continue to monitor      6  Hyponatremia  -Stable at 136  -Encourage PO intake    Disposition: Patient has been medically stable, awaiting Interventional Radiology input on the GREGORIO drain, will plan for discharge tomorrow if everything clear  Subjective:   Mr Felice Vaz still complains of low back pain not just at the site of the GREGORIO drain  He denies any fevers, chills, lack of appetite, nausea, vomiting, diarrhea  He is sleeping well  He is concerned about the persistent back pain  He is encouraged to be more active  His mother wants the GREGORIO drain to be taken out due to decreased serosanguinous fluid output and fear of re-infection if left in place  Objective  Mr Felice Vaz  is sitting up comfortably in bed  He is in no apparent distress  He is not in respiratory distress, he is breathing comfortably       Vitals: Temp (24hrs), Av 1 °F (36 7 °C), Min:98 °F (36 7 °C), Max:98 3 °F (36 8 °C)  Current: Temperature: 98 °F (36 7 °C)  Vitals:    17 0800 17 1706 17 2336 17 0900   BP: 110/67 155/72 139/82 138/79   Pulse: 69 79 75 81   Resp: 18 18 18 20   Temp: 98 2 °F (36 8 °C) 98 °F (36 7 °C) 98 3 °F (36 8 °C) 98 °F (36 7 °C)   TempSrc:  Oral Oral    SpO2: 98% 96% 98% 99%   Weight:       Height:        Body mass index is 35 79 kg/m²  I/O last 24 hours: In: 3325 5 [P O :2880; I V :445 5]  Out: 1350 [Urine:1350]      Physical Exam: /79   Pulse 81   Temp 98 °F (36 7 °C)   Resp 20   Ht 6' 3" (1 905 m)   Wt 130 kg (286 lb 6 oz)   SpO2 99%   BMI 35 79 kg/m²   General appearance: alert, appears stated age, cooperative and morbidly obese  Head: Normocephalic, without obvious abnormality, atraumatic  Neck: no JVD  Back: left flank GREGORIO drain, nonertythematous, no drainage leaking from incision site, slighty tender to palpation     Lungs: clear to auscultation bilaterally  Heart: regular rate and rhythm, S1, S2 normal, no murmur, click, rub or gallop  Abdomen: soft, non-tender; bowel sounds normal; no masses,  no organomegaly  Extremities: extremities normal, atraumatic, no cyanosis or edema  Skin: Skin color, texture, turgor normal  No rashes or lesions  Neurologic: Grossly normal     Invasive Devices     Drain            Closed/Suction Drain Left Perineal Bulb 8 5 Fr  4 days                    Labs:   Recent Results (from the past 24 hour(s))   CBC and differential    Collection Time: 08/21/17  5:06 AM   Result Value Ref Range    WBC 9 32 4 31 - 10 16 Thousand/uL    RBC 4 14 3 88 - 5 62 Million/uL    Hemoglobin 11 6 (L) 12 0 - 17 0 g/dL    Hematocrit 34 8 (L) 36 5 - 49 3 %    MCV 84 82 - 98 fL    MCH 28 0 26 8 - 34 3 pg    MCHC 33 3 31 4 - 37 4 g/dL    RDW 12 5 11 6 - 15 1 %    MPV 9 4 8 9 - 12 7 fL    Platelets 465 (H) 881 - 390 Thousands/uL    nRBC 0 /100 WBCs    Neutrophils Relative 53 43 - 75 %    Lymphocytes Relative 32 14 - 44 %    Monocytes Relative 9 4 - 12 %    Eosinophils Relative 5 0 - 6 %    Basophils Relative 1 0 - 1 %    Neutrophils Absolute 4 93 1 85 - 7 62 Thousands/µL    Lymphocytes Absolute 2 97 0 60 - 4 47 Thousands/µL    Monocytes Absolute 0 87 0 17 - 1 22 Thousand/µL    Eosinophils Absolute 0 45 0 00 - 0 61 Thousand/µL    Basophils Absolute 0 07 0 00 - 0 10 Thousands/µL   Comprehensive metabolic panel    Collection Time: 08/21/17  5:06 AM   Result Value Ref Range    Sodium 136 136 - 145 mmol/L    Potassium 4 1 3 5 - 5 3 mmol/L    Chloride 102 100 - 108 mmol/L    CO2 27 21 - 32 mmol/L    Anion Gap 7 4 - 13 mmol/L    BUN 8 5 - 25 mg/dL    Creatinine 0 61 0 60 - 1 30 mg/dL    Glucose 88 65 - 140 mg/dL    Calcium 9 5 8 3 - 10 1 mg/dL    AST 32 5 - 45 U/L    ALT 42 12 - 78 U/L    Alkaline Phosphatase 65 46 - 116 U/L    Total Protein 8 0 6 4 - 8 2 g/dL    Albumin 2 9 (L) 3 5 - 5 0 g/dL    Total Bilirubin 0 39 0 20 - 1 00 mg/dL    eGFR 134 ml/min/1 73sq m       Radiology Results: I have personally reviewed pertinent reports  Ir Tube Check    Result Date: 8/18/2017  Impression: Impression: Existing catheter is positioned eccentric to very small residual medial collection and was successfully exchanged and repositioned  There was no fluid return from this residual collection and output with irrigation was serosanguineous  Although this collection has been labeled "abscess", original cultures were negative  All signs indicate that this is likely an evolving hematoma and therefore unlikely source of fever and leukocytosis  Lack of significant decrease on serial CT is also consistent with evolving hematoma  Plan: He was instructed to stop catheter flushing  Follow-up tube check has been arranged next week with hopeful tube removal  The procedure, findings, and follow-up were discussed with the patient's mother in detail Workstation performed: [id number]     Ct Chest And Abdomen W Contrast    Result Date: 8/19/2017  Impression: 1  Minimal left basilar subsegmental atelectasis  Lungs otherwise clear  2   Stable position of left perinephric percutaneous drainage catheter with stable enhancing subcapsular collection  Inflammatory changes in the adjacent lateral conal and Gerota's fascia have mildly improved  3   Enlarged, fatty liver   4   Stable left para-aortic lymph nodes, likely reactive  Workstation performed: [id number]     Cta Chest Ct Abdomen Pelvis W Contrast    Result Date: 8/17/2017  Impression: 1  Suboptimal evaluation of the pulmonary arteries for reasons discussed above  No evidence of central pulmonary embolus  2   Drainage catheter within left perinephric subcapsular collection  3   Small bilateral nonobstructing renal calculi  4   Hepatomegaly and hepatic steatosis  5   No evidence of acute abnormality in the chest, abdomen or pelvis  Workstation performed: [id number]       Other Diagnostic Testing:   I have personally reviewed pertinent reports        Results from last 7 days  Lab Units 08/21/17  0506 08/20/17  0543 08/19/17  0458 08/18/17  0446   SODIUM mmol/L 136 135* 138 135*   POTASSIUM mmol/L 4 1 4 3 4 3 3 4*   CHLORIDE mmol/L 102 103 107 103   CO2 mmol/L 27 26 26 24   BUN mg/dL 8 8 7 10   CREATININE mg/dL 0 61 0 66 0 66 0 84   CALCIUM mg/dL 9 5 8 9 8 3 8 5   TOTAL PROTEIN g/dL 8 0  --  7 2 7 2   BILIRUBIN TOTAL mg/dL 0 39  --  0 54 0 58   ALK PHOS U/L 65  --  54 56   ALT U/L 42  --  28 35   AST U/L 32  --  15 17   GLUCOSE RANDOM mg/dL 88 98 113 133         Active Meds:   Current Facility-Administered Medications   Medication Dose Route Frequency    acetaminophen (TYLENOL) tablet 650 mg  650 mg Oral Q6H PRN    albuterol (PROVENTIL HFA,VENTOLIN HFA) inhaler 2 puff  2 puff Inhalation Q4H PRN    aluminum-magnesium hydroxide-simethicone (MYLANTA) 200-200-20 mg/5 mL oral suspension 30 mL  30 mL Oral Q6H PRN    enoxaparin (LOVENOX) subcutaneous injection 40 mg  40 mg Subcutaneous Q24H CHANG    ibuprofen (MOTRIN) tablet 400 mg  400 mg Oral Q6H PRN    ondansetron (ZOFRAN) injection 4 mg  4 mg Intravenous Q6H PRN    oxyCODONE (ROXICODONE) IR tablet 2 5 mg  2 5 mg Oral Q6H PRN    senna-docusate sodium (SENOKOT S) 8 6-50 mg per tablet 1 tablet  1 tablet Oral BID     Prescriptions Prior to Admission   Medication    [employer] PO    [] levofloxacin (LEVAQUIN) 750 mg tablet         VTE Pharmacologic Prophylaxis: Enoxaparin (Lovenox)  VTE Mechanical Prophylaxis: sequential compression device    Zulema De Leon MD  PGY-1

## 2017-08-21 NOTE — PLAN OF CARE
INFECTION - ADULT     Absence or prevention of progression during hospitalization Adequate for Discharge     Absence of fever/infection during neutropenic period Adequate for Discharge        PAIN - ADULT     Verbalizes/displays adequate comfort level or baseline comfort level Adequate for Discharge        SAFETY ADULT     Patient will remain free of falls Adequate for Discharge     Maintain or return to baseline ADL function Adequate for Discharge     Maintain or return mobility status to optimal level Adequate for Discharge

## 2017-08-22 ENCOUNTER — APPOINTMENT (INPATIENT)
Dept: RADIOLOGY | Facility: HOSPITAL | Age: 32
DRG: 710 | End: 2017-08-22
Payer: COMMERCIAL

## 2017-08-22 VITALS
DIASTOLIC BLOOD PRESSURE: 80 MMHG | TEMPERATURE: 97.9 F | WEIGHT: 286.38 LBS | BODY MASS INDEX: 35.61 KG/M2 | OXYGEN SATURATION: 98 % | SYSTOLIC BLOOD PRESSURE: 136 MMHG | HEART RATE: 74 BPM | RESPIRATION RATE: 20 BRPM | HEIGHT: 75 IN

## 2017-08-22 LAB
ANION GAP SERPL CALCULATED.3IONS-SCNC: 7 MMOL/L (ref 4–13)
BACTERIA BLD CULT: NORMAL
BACTERIA BLD CULT: NORMAL
BASOPHILS # BLD AUTO: 0.08 THOUSANDS/ΜL (ref 0–0.1)
BASOPHILS NFR BLD AUTO: 1 % (ref 0–1)
BUN SERPL-MCNC: 9 MG/DL (ref 5–25)
CALCIUM SERPL-MCNC: 9.6 MG/DL (ref 8.3–10.1)
CHLORIDE SERPL-SCNC: 103 MMOL/L (ref 100–108)
CO2 SERPL-SCNC: 27 MMOL/L (ref 21–32)
CREAT SERPL-MCNC: 0.67 MG/DL (ref 0.6–1.3)
EOSINOPHIL # BLD AUTO: 0.58 THOUSAND/ΜL (ref 0–0.61)
EOSINOPHIL NFR BLD AUTO: 6 % (ref 0–6)
ERYTHROCYTE [DISTWIDTH] IN BLOOD BY AUTOMATED COUNT: 12.5 % (ref 11.6–15.1)
GFR SERPL CREATININE-BSD FRML MDRD: 129 ML/MIN/1.73SQ M
GLUCOSE SERPL-MCNC: 98 MG/DL (ref 65–140)
HCT VFR BLD AUTO: 39 % (ref 36.5–49.3)
HGB BLD-MCNC: 13.2 G/DL (ref 12–17)
LYMPHOCYTES # BLD AUTO: 2.91 THOUSANDS/ΜL (ref 0.6–4.47)
LYMPHOCYTES NFR BLD AUTO: 30 % (ref 14–44)
MCH RBC QN AUTO: 28.2 PG (ref 26.8–34.3)
MCHC RBC AUTO-ENTMCNC: 33.8 G/DL (ref 31.4–37.4)
MCV RBC AUTO: 83 FL (ref 82–98)
MONOCYTES # BLD AUTO: 0.93 THOUSAND/ΜL (ref 0.17–1.22)
MONOCYTES NFR BLD AUTO: 10 % (ref 4–12)
NEUTROPHILS # BLD AUTO: 5.12 THOUSANDS/ΜL (ref 1.85–7.62)
NEUTS SEG NFR BLD AUTO: 53 % (ref 43–75)
NRBC BLD AUTO-RTO: 0 /100 WBCS
PLATELET # BLD AUTO: 571 THOUSANDS/UL (ref 149–390)
PMV BLD AUTO: 8.9 FL (ref 8.9–12.7)
POTASSIUM SERPL-SCNC: 4.4 MMOL/L (ref 3.5–5.3)
RBC # BLD AUTO: 4.68 MILLION/UL (ref 3.88–5.62)
SODIUM SERPL-SCNC: 137 MMOL/L (ref 136–145)
WBC # BLD AUTO: 9.66 THOUSAND/UL (ref 4.31–10.16)

## 2017-08-22 PROCEDURE — 80048 BASIC METABOLIC PNL TOTAL CA: CPT | Performed by: INTERNAL MEDICINE

## 2017-08-22 PROCEDURE — 85025 COMPLETE CBC W/AUTO DIFF WBC: CPT | Performed by: INTERNAL MEDICINE

## 2017-08-22 RX ADMIN — Medication 1 TABLET: at 10:05

## 2017-08-22 NOTE — DISCHARGE SUMMARY
[facility] Discharge Summary - Medical Jamil Fey 29 y o  male MRN: [id number]    Chun [address]  Room / Bed: [facility] 602/[facility] 013-63 Encounter: [id number]    BRIEF OVERVIEW    Admitting Provider: Ronaldo Praada MD  Discharge Provider: Ronaldo Parada MD   Primary Care Physician at Discharge: Ghada Hahn as patient lives in [place] [address]     Discharge To: Home    Admission Date: 8/17/2017     Discharge Date: 8/22/2017  2:12 PM    Primary Discharge Diagnosis  Principal Problem:    Severe sepsis  Active Problems:    Asthma    Morbid obesity    Perinephric abscess    Nephrolithiasis  Resolved Problems:    * No resolved hospital problems   *      Other Problems Addressed: None    Consulting Providers   Infectious Disease: Dr José Miguel Sultana  Interventional Radiology: Dr Femi Guy   Urology: Dr Pratima Haq          Therapeutic Operative Procedures Performed  Shaune Quick check and repositon [8/17/2017]  GREGORIO Drain removal [8/22/2017]    Diagnostic Procedures Performed  CBC:   Results from last 7 days  Lab Units 08/22/17  0508   WBC Thousand/uL 9 66   RBC Million/uL 4 68   HEMOGLOBIN g/dL 13 2   HEMATOCRIT % 39 0   MCV fL 83   MCH pg 28 2   MCHC g/dL 33 8   RDW % 12 5   MPV fL 8 9   PLATELETS Thousands/uL 571*   NRBC AUTO /100 WBCs 0   NEUTROS PCT % 53   LYMPHS PCT % 30   MONOS PCT % 10   EOS PCT % 6   BASOS PCT % 1   NEUTROS ABS Thousands/µL 5 12   LYMPHS ABS Thousands/µL 2 91   MONOS ABS Thousand/µL 0 93   EOS ABS Thousand/µL 0 58   , Chemistry Profile:   Results from last 7 days  Lab Units 08/22/17  0508 08/21/17  0506   SODIUM mmol/L 137 136   POTASSIUM mmol/L 4 4 4 1   CHLORIDE mmol/L 103 102   CO2 mmol/L 27 27   ANION GAP mmol/L 7 7   BUN mg/dL 9 8   CREATININE mg/dL 0 67 0 61   GLUCOSE RANDOM mg/dL 98 88   CALCIUM mg/dL 9 6 9 5   AST U/L  --  32   ALT U/L  --  42   ALK PHOS U/L  --  65   TOTAL PROTEIN g/dL  --  8 0   ALBUMIN g/dL  --  2 9*   BILIRUBIN TOTAL mg/dL  --  0 39   EGFR ml/min/1 73sq m 129 134   , Coagulation Studies:   Results from last 7 days  Lab Units 08/18/17  0446   PROTIME seconds 14 5*   INR  1 13   PTT seconds 34   , Cardiac Studies:   Results from last 7 days  Lab Units 08/17/17  1215   POC TROPONIN I  ng/ml 0 00   , Additional Labs:   Results from last 7 days  Lab Units 08/17/17 2014 08/17/17  1639 08/17/17  1208   LACTIC ACID mmol/L 1 2 1 8 2 3*   , iSTAT CHEM 8:   Results from last 7 days  Lab Units 08/22/17  0508   EGFR ml/min/1 73sq m 129   GLUCOSE RANDOM mg/dL 98   HEMOGLOBIN g/dL 13 2   ,, Last A1C/Lipid Panel/Thyroid Panel:   Lab Results   Component Value Date    HGBA1C 6 0 07/26/2017    TIB5ULPSGFON 0 545 07/26/2017   Blood Culture: No growth at 5 days      Ir Tube Check    Result Date: 8/18/2017  Impression: Impression: Existing catheter is positioned eccentric to very small residual medial collection and was successfully exchanged and repositioned  There was no fluid return from this residual collection and output with irrigation was serosanguineous  Although this collection has been labeled "abscess", original cultures were negative  All signs indicate that this is likely an evolving hematoma and therefore unlikely source of fever and leukocytosis  Lack of significant decrease on serial CT is also consistent with evolving hematoma  Plan: He was instructed to stop catheter flushing  Follow-up tube check has been arranged next week with hopeful tube removal  The procedure, findings, and follow-up were discussed with the patient's mother in detail Workstation performed: [id number]     Ct Chest And Abdomen W Contrast    Result Date: 8/19/2017  Impression: 1  Minimal left basilar subsegmental atelectasis  Lungs otherwise clear  2   Stable position of left perinephric percutaneous drainage catheter with stable enhancing subcapsular collection  Inflammatory changes in the adjacent lateral conal and Gerota's fascia have mildly improved  3   Enlarged, fatty liver   4   Stable left para-aortic lymph nodes, likely reactive  Workstation performed: [id number]     Cta Chest Ct Abdomen Pelvis W Contrast    Result Date: 8/17/2017  Impression: 1  Suboptimal evaluation of the pulmonary arteries for reasons discussed above  No evidence of central pulmonary embolus  2   Drainage catheter within left perinephric subcapsular collection  3   Small bilateral nonobstructing renal calculi  4   Hepatomegaly and hepatic steatosis  5   No evidence of acute abnormality in the chest, abdomen or pelvis  Workstation performed: [id number]           Discharge Disposition: Home/Self Care  Discharged With Lines: no    Test Results Pending at Discharge: None    Outpatient Follow-Up  Establish PCP  Dr Will Loomis- Urology     Code Status: Level 1 - Full Code    Medications   Your Medications     STOP taking these medications     STOP taking these medications    levofloxacin 750 mg tablet   Commonly known as: LEVAQUIN    TAKE these medications     TAKE these medications    Morning Afternoon Evening Bedtime As Needed   HAWTHORN BERRIES PO   Take 60 mL by mouth daily   Refills: 0                       Allergies  Allergies   Allergen Reactions    Amoxicillin Hives    Penicillins Hives     Discharge Diet: regular diet  Activity restrictions: none    [address] [facility] Course  Mr Suresh Solo is a 29year old male with history of asthma, nephrolithiasis and recent left-sided perinephric abscess and kidney stone and s/p left sided GREGORIO drain placement discharged on PO levaquin recently presented with severe left flank pain, fevers, and was admitted for severe sepsis secondary to abdominal source  On admission his lactate was 2 3, white count of 23,000, and was tachycardic  Chest CT was negative for active pulmonary infection  CT abdomen/pelvis showed Small bilateral nonobstructing renal calculi  And an ongoing perinephric collection with small remnant of collection not being drained by catheter  Interventional Radiology rechecked and repositioned the drain and placed it in the area of the collection  Blood cultures x 2 were drawn and Urinalysis was within normal limits   Patient was started on IV Cefepime, IV fluids, Dilaudid 2 mg IV PRN for pain, and IV Zofran 4 mg PRN for nausea, and Acetaminophen for fevers  His lactate normalize and his [place] count decreased to [address]  There was concern for persistent leukocytosis and low grade fevers so Infectious Disease were consulted  When Infectious Disease initially saw the patient, IV Cefepime was continued, and a repeat CT chest abdomen and pelvis was obtained  Repeat  CT revealed improving inflammation of left kidney and was otherwise stable  At that point the Leukocytosis had resolved to 10,000 and patient had been afebrile  Infectious Disease discontinued antibiotics to allow occult infection to declare if there is one  Over the course of the next two days off the antibiotics,  Patient remained afebrile, no leukocytosis,  Remained hemodynamically stable  Interventional Radiology was consulted for GREGORIO drain removal as output had decreased  As patient was hemodynamically stable and afebrile off antibiotics for greater than 48 hours and blood cultures x 2 had no growth after 5 days the patient was declared stable for discharge to home  At discharge, patient was changed into normal clothes, smiling and laughing with is mother and did not seem to be in any distress  He reported feeling much better after the drain was removed and asked for a doctor's note for work  His asthma is controlled currently with herbal supplements but in the hospital needed albuterol prn for symptoms  Was advised to have outpatient follow up to manage asthma  Morbid Obesity: was counseled on lifestyle modifications  Patient was advised if spiking of fever or having worsening symptoms than he is to go to the closest emergency department   Patient also counseled and encouraged to establish primary care physician care  Patient is to follow up with Dr Adail Barrow as outpatient  Presenting Problem/History of Present Illness  Principal Problem:    Severe sepsis  Active Problems:    Asthma    Morbid obesity    Perinephric abscess    Nephrolithiasis  Resolved Problems:    * No resolved hospital problems  *        Other Pertinent Test Results  None    Discharge Condition: good      Discharge  Statement   I spent 1 hour minutes discharging the patient  This time was spent on the day of discharge  I had direct contact with the patient on the day of discharge  Coordination of care with Infectious Disease, Interventional Radiology, nursing, case management, and patient and patient's mother

## 2017-08-22 NOTE — DISCHARGE INSTRUCTIONS
Please establish care with a Primary Care Physician in your area by using [employer], information will be provided  Please follow up with Primary Care within 7-10 days of discharge  Please follow up with Urology Dr Adali Barrow within one week of discharge  If you develop fever greater than 101 degrees Fahrenheit please go to your nearest Emergency Department for evaluation

## 2017-08-22 NOTE — PLAN OF CARE
Problem: DISCHARGE PLANNING - CARE MANAGEMENT  Goal: Discharge to post-acute care or home with appropriate resources  INTERVENTIONS:  - Conduct assessment to determine patient/family and health care team treatment goals, and need for post-acute services based on payer coverage, community resources, and patient preferences, and barriers to discharge  - Address psychosocial, clinical, and financial barriers to discharge as identified in assessment in conjunction with the patient/family and health care team  - Arrange appropriate level of post-acute services according to patient's   needs and preference and payer coverage in collaboration with the physician and health care team  - Communicate with and update the patient/family, physician, and health care team regarding progress on the discharge plan  - Arrange appropriate transportation to post-acute venues  Pt to go home to family when medically clear for discharge  Outcome: Progressing

## 2017-08-22 NOTE — SOCIAL WORK
CM met pt at bedside and made aware of CM role at discharge  Pt reported stephanie he lives with his fiancee [phone] and their 9year old child in a 1 story house with 4 NEGRA  PT denies having POA or LW  Pt was IPTA with all ADL's and drive  Pt denies hx of HHC, STR and alc/drug and psych consult  Pt has no PCP, INFOlink card given  Pts gets medication from [facility] in [address] [place] [address]  Pts mother [phone] will transport pt home when medically clear for discharge  CM reviewed d/c planning process including the following: identifying help at home, patient preference for d/c planning needs, Discharge Lounge, [facility] Meds to Bed program, availability of treatment team to discuss questions or concerns patient and/or family may have regarding understanding medications and recognizing signs and symptoms once discharged  CM also encouraged patient to follow up with all recommended appointments after discharge  Patient advised of importance for patient and family to participate in managing patients medical well being

## 2017-08-22 NOTE — PROGRESS NOTES
Progress Note - Infectious Disease   Justin Piedra 29 y o  male MRN: [id number]  [facility] Encounter: [id number]      Impression/Plan:  #1  SIRSNo infectious etiology has been found  Possibly secondary to hematoma  Possibly secondary to a viral infection  Possibly secondary to a drug reaction but this is less likely without a rash or other evidence  Fortunately, the patient remains hemodynamically stable and nontoxic despite his systemic illness  Fever and leukocytosis have resolved  He remained stable off all antibiotics  -Monitor off all antibiotics and allow any occult infection to declare  -Follow-up blood cultures  -Monitor temperature and CBC with differential  -Monitor renal function  -No additional ID workup for now     #2  Chest and abdominal painsuspect somehow related to #1  No other clear source appreciated  Initial CT scan did not reveal anything as a causative etiology  Follow-up CT scan remained stable  The symptoms seem to be better  The patient is now ambulating in the hallway without difficulty  -Monitor symptoms  -Pain management  -No additional ID workup for now  -Monitor off antibiotics as above     #3  Perinephric hematomathe collection that has been drained has been consistent with blood, and the cultures are negative  No microbiologic evidence that this is an abscess  Certainly hematoma can become secondarily infected but to this point there is been no evidence of that happening at this time   [facility] management  -Interventional radiology and urology follow-up  -No additional antibiotics  -Possibly remove GREGORIO drain     #4  Morbid obesity     Extremely prolonged discussion with the patient's mother as well as the patient  Also discussed in detail with the primary service  No active infectious disease issues at this time  Please reconsult me as needed      Antibiotics:  None    Subjective:  Patient has no fever, chills, sweats; no nausea, vomiting, diarrhea; no cough, shortness of breath; no change in the back pain around the GREGORIO site  No new symptoms  He is ambulating in the hallway without difficulty  Objective:  Vitals:  HR:  [74-86] 74  Resp:  [20] 20  BP: (136-149)/(79-85) 136/80  SpO2:  [94 %-100 %] 98 %  Temp (24hrs), Av 4 °F (36 9 °C), Min:97 9 °F (36 6 °C), Max:99 °F (37 2 °C)  Current: Temperature: 97 9 °F (36 6 °C)    Physical Exam:   General Appearance:  Alert, nontoxic, no acute distress  Throat: Oropharynx moist without lesions  Lungs:   Clear to auscultation bilaterally,; respirations unlabored   Heart:  RRR; no murmur, rub or gallop   Abdomen:   Soft, non-tender, non-distended, positive bowel sounds  Left-sided GREGORIO drain in place in the flank without any surrounding erythema  Scant bloody drainage of the drain  Extremities: No clubbing, cyanosis or edema   Skin: No new rashes or lesions  No draining wounds noted         Labs, Imaging, & Other studies:   All pertinent labs and imaging studies were personally reviewed    Results from last 7 days  Lab Units 17  0508 17  0506 17  0543   WBC Thousand/uL 9 66 9 32 10 14   HEMOGLOBIN g/dL 13 2 11 6* 11 4*   PLATELETS Thousands/uL 571* 540* 470*       Results from last 7 days  Lab Units 17  0508 17  0506 17  0543 17  0458 17  0446   SODIUM mmol/L 137 136 135* 138 135*   POTASSIUM mmol/L 4 4 4 1 4 3 4 3 3 4*   CHLORIDE mmol/L 103 102 103 107 103   CO2 mmol/L 27 27 26 26 24   ANION GAP mmol/L 7 7 6 5 8   BUN mg/dL 9 8 8 7 10   CREATININE mg/dL 0 67 0 61 0 66 0 66 0 84   EGFR ml/min/1 73sq m 129 134 130 130 117   GLUCOSE RANDOM mg/dL 98 88 98 113 133   CALCIUM mg/dL 9 6 9 5 8 9 8 3 8 5   AST U/L  --  32  --  15 17   ALT U/L  --  42  --  28 35   ALK PHOS U/L  --  65  --  54 56   TOTAL PROTEIN g/dL  --  8 0  --  7 2 7 2   ALBUMIN g/dL  --  2 9*  --  2 6* 2 7*   BILIRUBIN TOTAL mg/dL  --  0 39  --  0 54 0 58       Results from last 7 days  Lab Units 17  3638 BLOOD CULTURE  No Growth After 4 Days  No Growth After 4 Days

## 2017-08-22 NOTE — PROGRESS NOTES
IM Residency Progress Note   Unit/Bed#: [facility] 602-01 Encounter: [id number]  SOD Team B       Carlitos Bassett 29 y o  male [id number]    Hospital Stay Days: 5      Assessment/Plan:    Principal Problem:    Severe sepsis  Active Problems:    Asthma    Morbid obesity    Perinephric abscess    Nephrolithiasis    1  Sepsis  -Presented with Leukocytosis at 23, temperature of 100 2, lactate of 2 3, with abdominal source  -Was considered likely secondary to left perinephric abscess and GREGORIO drain  -Was given one time dose of Vancomycin and started on IV Cefipime [Day 2]   -Urinalysis was negative, with just some trace RBC, urine culture was not ordered  -Repeat lactate was 1 8 and 1 2 Stopped trending  -Repeat CBC revealed leukocytosis down to 9  -IR performed a check of his tube on admission and repositioned the GREGORIO drain and instructed patient to no longer flush the tube and to return in one week for possible tube removal    -Urology [Dr Ara Leventhal saw the patient and recommended the continuation of his IV antibiotics and to follow cultures  Patient will be stable to discharge once afebrile for 24 hours  Plan for outpatient drain removal  Recommendations greatly appreciated  -IV fluids have been discontinued   -Blood cultures x 2 negative after 5 days, Repeat Urinalysis was also negative  -Repeat CT Scan revealed mild improvement in inflammatory changes in left kidney    -Infectious Disease saw patient, recommendations greatly appreciated  Have discontinued antibiotics in setting of being aferbile over a 48 hour period and no leukocytosis  Dr Nieves Smith discussed with patient and mother at length    -White cell count was 9 today     -GREGORIO drain will be removed by IR today, will discharge as patient continues to be afebrile and no leukocytosis off the antibiotics      2  Left Perinephric Abscess  -Patient still having pain at the [address] drain site, very little serosanguinous fluid    -Interventional Radiology will see patient today, recommendations greatly appreciated and will remove GREGORIO drain if appropriate   -Acetaminophen and Ibuprofen and Oxycodone PRN for pain     3  Asthma   -Patient complaining of some chest tightness with breathing, patient normally at home takes herbal supplements "Clear Lungs " Discussed with patient following up with PCP or Pulmonologist as outpatient to manage asthma long term  -Will provide PRN Albuterol and patient has peak flow which he is encouraged to use  Encourage to ambulate     4  Morbid Obesity   -BMI is 36    -Will provide dietary and lifestyle modifications    -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 obesity       5  Hypokalemia   -Stable at 4 4  -Will continue to monitor      6  Hyponatremia  -Stable at 137  -Encourage PO intake       Disposition: Patient is medically stable has been afebrile, no leukocytosis, and GREGORIO drain removal by IR  Will discharge home      Subjective:   Mr Iris Dan is laying comfortably in bed  He really wants to get his drain taken out as he is convinced that it is the root of his back pain  He denies any fevers, chills, shortness of breath, headache, nausea, vomiting, diarrhea  He has a regular appetite and is trying to be more active  He states that he is feeling better overall  Mother is at bedside per usual and is inquiring about blood culture sensitivities after an in-depth conversation by Dr Lavelle Roth  Vitals: Temp (24hrs), Av 6 °F (37 °C), Min:97 9 °F (36 6 °C), Max:99 °F (37 2 °C)  Current: Temperature: 97 9 °F (36 6 °C)  Vitals:    17 1522 17 1627 17 2329 17 0628   BP: 137/85  149/80 136/80   Pulse: 86  78 74   Resp: 20  20 20   Temp: 99 °F (37 2 °C) 98 7 °F (37 1 °C) 98 6 °F (37 °C) 97 9 °F (36 6 °C)   TempSrc: Oral Oral Oral Oral   SpO2: 100%  94% 98%   Weight:       Height:        Body mass index is 35 79 kg/m²  I/O last 24 hours:   In:  [P O :192]  Out: 450 [Urine:450]      Physical Exam: /80   Pulse 74   Temp 97 9 °F (36 6 °C) (Oral)   Resp 20   Ht 6' 3" (1 905 m)   Wt 130 kg (286 lb 6 oz)   SpO2 98%   BMI 35 79 kg/m²   General appearance: alert, appears stated age, cooperative, no distress and morbidly obese  Head: Normocephalic, without obvious abnormality, atraumatic  Eyes: negative findings: conjunctivae and sclerae normal and pupils equal, round, reactive to light and accomodation  Neck: no JVD  Back: GREGORIO Drain site in left flank is nonerythematous, nontender, with no purulent dischrge  Lungs: clear to auscultation bilaterally  Heart: regular rate and rhythm, S1, S2 normal, no murmur, click, rub or gallop  Abdomen: soft, non-tender; bowel sounds normal; no masses,  no organomegaly  Extremities: extremities normal, atraumatic, no cyanosis or edema  Neurologic: Grossly normal     Invasive Devices          No matching active lines, drains, or airways              Labs:   Recent Results (from the past 24 hour(s))   CBC and differential    Collection Time: 08/22/17  5:08 AM   Result Value Ref Range    WBC 9 66 4 31 - 10 16 Thousand/uL    RBC 4 68 3 88 - 5 62 Million/uL    Hemoglobin 13 2 12 0 - 17 0 g/dL    Hematocrit 39 0 36 5 - 49 3 %    MCV 83 82 - 98 fL    MCH 28 2 26 8 - 34 3 pg    MCHC 33 8 31 4 - 37 4 g/dL    RDW 12 5 11 6 - 15 1 %    MPV 8 9 8 9 - 12 7 fL    Platelets 270 (H) 220 - 390 Thousands/uL    nRBC 0 /100 WBCs    Neutrophils Relative 53 43 - 75 %    Lymphocytes Relative 30 14 - 44 %    Monocytes Relative 10 4 - 12 %    Eosinophils Relative 6 0 - 6 %    Basophils Relative 1 0 - 1 %    Neutrophils Absolute 5 12 1 85 - 7 62 Thousands/µL    Lymphocytes Absolute 2 91 0 60 - 4 47 Thousands/µL    Monocytes Absolute 0 93 0 17 - 1 22 Thousand/µL    Eosinophils Absolute 0 58 0 00 - 0 61 Thousand/µL    Basophils Absolute 0 08 0 00 - 0 10 Thousands/µL   Basic metabolic panel    Collection Time: 08/22/17  5:08 AM   Result Value Ref Range    Sodium 137 136 - 145 mmol/L    Potassium 4 4 3 5 - 5 3 mmol/L    Chloride 103 100 - 108 mmol/L    CO2 27 21 - 32 mmol/L    Anion Gap 7 4 - 13 mmol/L    BUN 9 5 - 25 mg/dL    Creatinine 0 67 0 60 - 1 30 mg/dL    Glucose 98 65 - 140 mg/dL    Calcium 9 6 8 3 - 10 1 mg/dL    eGFR 129 ml/min/1 73sq m       Radiology Results: I have personally reviewed pertinent reports  Ir Tube Check    Result Date: 8/18/2017  Impression: Impression: Existing catheter is positioned eccentric to very small residual medial collection and was successfully exchanged and repositioned  There was no fluid return from this residual collection and output with irrigation was serosanguineous  Although this collection has been labeled "abscess", original cultures were negative  All signs indicate that this is likely an evolving hematoma and therefore unlikely source of fever and leukocytosis  Lack of significant decrease on serial CT is also consistent with evolving hematoma  Plan: He was instructed to stop catheter flushing  Follow-up tube check has been arranged next week with hopeful tube removal  The procedure, findings, and follow-up were discussed with the patient's mother in detail Workstation performed: [id number]     Ct Chest And Abdomen W Contrast    Result Date: 8/19/2017  Impression: 1  Minimal left basilar subsegmental atelectasis  Lungs otherwise clear  2   Stable position of left perinephric percutaneous drainage catheter with stable enhancing subcapsular collection  Inflammatory changes in the adjacent lateral conal and Gerota's fascia have mildly improved  3   Enlarged, fatty liver  4   Stable left para-aortic lymph nodes, likely reactive  Workstation performed: [id number]     Cta Chest Ct Abdomen Pelvis W Contrast    Result Date: 8/17/2017  Impression: 1  Suboptimal evaluation of the pulmonary arteries for reasons discussed above  No evidence of central pulmonary embolus  2   Drainage catheter within left perinephric subcapsular collection  3   Small bilateral nonobstructing renal calculi   4  Hepatomegaly and hepatic steatosis  5   No evidence of acute abnormality in the chest, abdomen or pelvis  Workstation performed: [id number]       Other Diagnostic Testing:   I have personally reviewed pertinent reports        Results from last 7 days  Lab Units 08/22/17  0508 08/21/17  0506 08/20/17  0543 08/19/17  0458 08/18/17  0446   SODIUM mmol/L 137 136 135* 138 135*   POTASSIUM mmol/L 4 4 4 1 4 3 4 3 3 4*   CHLORIDE mmol/L 103 102 103 107 103   CO2 mmol/L 27 27 26 26 24   BUN mg/dL 9 8 8 7 10   CREATININE mg/dL 0 67 0 61 0 66 0 66 0 84   CALCIUM mg/dL 9 6 9 5 8 9 8 3 8 5   TOTAL PROTEIN g/dL  --  8 0  --  7 2 7 2   BILIRUBIN TOTAL mg/dL  --  0 39  --  0 54 0 58   ALK PHOS U/L  --  65  --  54 56   ALT U/L  --  42  --  28 35   AST U/L  --  32  --  15 17   GLUCOSE RANDOM mg/dL 98 88 98 113 133         Active Meds:   Current Facility-Administered Medications   Medication Dose Route Frequency    acetaminophen (TYLENOL) tablet 650 mg  650 mg Oral Q6H PRN    albuterol (PROVENTIL HFA,VENTOLIN HFA) inhaler 2 puff  2 puff Inhalation Q4H PRN    aluminum-magnesium hydroxide-simethicone (MYLANTA) 200-200-20 mg/5 mL oral suspension 30 mL  30 mL Oral Q6H PRN    enoxaparin (LOVENOX) subcutaneous injection 40 mg  40 mg Subcutaneous Q24H CHANG    ibuprofen (MOTRIN) tablet 400 mg  400 mg Oral Q6H PRN    ondansetron (ZOFRAN) injection 4 mg  4 mg Intravenous Q6H PRN    oxyCODONE (ROXICODONE) IR tablet 2 5 mg  2 5 mg Oral Q6H PRN    senna-docusate sodium (SENOKOT S) 8 6-50 mg per tablet 1 tablet  1 tablet Oral BID         VTE Pharmacologic Prophylaxis: Enoxaparin (Lovenox)  VTE Mechanical Prophylaxis: sequential compression device    Juani Souza MD  PGY-1

## 2017-08-24 ENCOUNTER — GENERIC CONVERSION - ENCOUNTER (OUTPATIENT)
Dept: OTHER | Facility: OTHER | Age: 32
End: 2017-08-24

## 2017-08-24 ENCOUNTER — APPOINTMENT (OUTPATIENT)
Dept: LAB | Facility: HOSPITAL | Age: 32
End: 2017-08-24
Payer: COMMERCIAL

## 2017-08-24 ENCOUNTER — ALLSCRIPTS OFFICE VISIT (OUTPATIENT)
Dept: OTHER | Facility: OTHER | Age: 32
End: 2017-08-24

## 2017-08-24 DIAGNOSIS — N20.0 CALCULUS OF KIDNEY: ICD-10-CM

## 2017-08-24 DIAGNOSIS — Z00.00 ENCOUNTER FOR GENERAL ADULT MEDICAL EXAMINATION WITHOUT ABNORMAL FINDINGS: ICD-10-CM

## 2017-08-24 LAB
ALBUMIN SERPL BCP-MCNC: 3.4 G/DL (ref 3.5–5)
ALP SERPL-CCNC: 63 U/L (ref 46–116)
ALT SERPL W P-5'-P-CCNC: 76 U/L (ref 12–78)
ANION GAP SERPL CALCULATED.3IONS-SCNC: 6 MMOL/L (ref 4–13)
AST SERPL W P-5'-P-CCNC: 49 U/L (ref 5–45)
BACTERIA UR QL AUTO: ABNORMAL /HPF
BASOPHILS # BLD AUTO: 0.11 THOUSANDS/ΜL (ref 0–0.1)
BASOPHILS NFR BLD AUTO: 1 % (ref 0–1)
BILIRUB SERPL-MCNC: 0.2 MG/DL (ref 0.2–1)
BILIRUB UR QL STRIP: NEGATIVE
BUN SERPL-MCNC: 10 MG/DL (ref 5–25)
CALCIUM ALBUM COR SERPL-MCNC: 10.7 MG/DL (ref 8.3–10.1)
CALCIUM SERPL-MCNC: 10.2 MG/DL (ref 8.3–10.1)
CHLORIDE SERPL-SCNC: 99 MMOL/L (ref 100–108)
CLARITY UR: CLEAR
CO2 SERPL-SCNC: 31 MMOL/L (ref 21–32)
COLOR UR: YELLOW
CREAT SERPL-MCNC: 0.85 MG/DL (ref 0.6–1.3)
EOSINOPHIL # BLD AUTO: 0.39 THOUSAND/ΜL (ref 0–0.61)
EOSINOPHIL NFR BLD AUTO: 4 % (ref 0–6)
ERYTHROCYTE [DISTWIDTH] IN BLOOD BY AUTOMATED COUNT: 12.1 % (ref 11.6–15.1)
GFR SERPL CREATININE-BSD FRML MDRD: 117 ML/MIN/1.73SQ M
GLUCOSE P FAST SERPL-MCNC: 135 MG/DL (ref 65–99)
GLUCOSE UR STRIP-MCNC: NEGATIVE MG/DL
HCT VFR BLD AUTO: 42.3 % (ref 36.5–49.3)
HGB BLD-MCNC: 13.7 G/DL (ref 12–17)
HGB UR QL STRIP.AUTO: ABNORMAL
KETONES UR STRIP-MCNC: NEGATIVE MG/DL
LEUKOCYTE ESTERASE UR QL STRIP: NEGATIVE
LYMPHOCYTES # BLD AUTO: 2.35 THOUSANDS/ΜL (ref 0.6–4.47)
LYMPHOCYTES NFR BLD AUTO: 27 % (ref 14–44)
MCH RBC QN AUTO: 27.2 PG (ref 26.8–34.3)
MCHC RBC AUTO-ENTMCNC: 32.4 G/DL (ref 31.4–37.4)
MCV RBC AUTO: 84 FL (ref 82–98)
MONOCYTES # BLD AUTO: 0.74 THOUSAND/ΜL (ref 0.17–1.22)
MONOCYTES NFR BLD AUTO: 8 % (ref 4–12)
NEUTROPHILS # BLD AUTO: 5.16 THOUSANDS/ΜL (ref 1.85–7.62)
NEUTS SEG NFR BLD AUTO: 59 % (ref 43–75)
NITRITE UR QL STRIP: NEGATIVE
NON-SQ EPI CELLS URNS QL MICRO: ABNORMAL /HPF
NRBC BLD AUTO-RTO: 0 /100 WBCS
PH UR STRIP.AUTO: 5.5 [PH] (ref 4.5–8)
PLATELET # BLD AUTO: 621 THOUSANDS/UL (ref 149–390)
PMV BLD AUTO: 8.7 FL (ref 8.9–12.7)
POTASSIUM SERPL-SCNC: 4.7 MMOL/L (ref 3.5–5.3)
PROT SERPL-MCNC: 8.9 G/DL (ref 6.4–8.2)
PROT UR STRIP-MCNC: NEGATIVE MG/DL
RBC # BLD AUTO: 5.04 MILLION/UL (ref 3.88–5.62)
RBC #/AREA URNS AUTO: ABNORMAL /HPF
SODIUM SERPL-SCNC: 136 MMOL/L (ref 136–145)
SP GR UR STRIP.AUTO: 1.02 (ref 1–1.03)
UROBILINOGEN UR QL STRIP.AUTO: 0.2 E.U./DL
WBC # BLD AUTO: 8.8 THOUSAND/UL (ref 4.31–10.16)
WBC #/AREA URNS AUTO: ABNORMAL /HPF

## 2017-08-24 PROCEDURE — 80053 COMPREHEN METABOLIC PANEL: CPT

## 2017-08-24 PROCEDURE — 85025 COMPLETE CBC W/AUTO DIFF WBC: CPT

## 2017-08-24 PROCEDURE — 81001 URINALYSIS AUTO W/SCOPE: CPT

## 2017-08-24 PROCEDURE — 36415 COLL VENOUS BLD VENIPUNCTURE: CPT

## 2017-09-05 ENCOUNTER — GENERIC CONVERSION - ENCOUNTER (OUTPATIENT)
Dept: OTHER | Facility: OTHER | Age: 32
End: 2017-09-05

## 2017-09-05 ENCOUNTER — APPOINTMENT (OUTPATIENT)
Dept: LAB | Facility: HOSPITAL | Age: 32
End: 2017-09-05
Payer: COMMERCIAL

## 2017-09-05 ENCOUNTER — ALLSCRIPTS OFFICE VISIT (OUTPATIENT)
Dept: OTHER | Facility: OTHER | Age: 32
End: 2017-09-05

## 2017-09-05 DIAGNOSIS — N20.0 CALCULUS OF KIDNEY: ICD-10-CM

## 2017-09-05 DIAGNOSIS — D47.3 ESSENTIAL HEMORRHAGIC THROMBOCYTHEMIA (HCC): ICD-10-CM

## 2017-09-05 LAB
ALBUMIN SERPL BCP-MCNC: 3.7 G/DL (ref 3.5–5)
ALP SERPL-CCNC: 52 U/L (ref 46–116)
ALT SERPL W P-5'-P-CCNC: 68 U/L (ref 12–78)
ANION GAP SERPL CALCULATED.3IONS-SCNC: 6 MMOL/L (ref 4–13)
AST SERPL W P-5'-P-CCNC: 40 U/L (ref 5–45)
BASOPHILS # BLD AUTO: 0.08 THOUSANDS/ΜL (ref 0–0.1)
BASOPHILS NFR BLD AUTO: 1 % (ref 0–1)
BILIRUB SERPL-MCNC: 0.2 MG/DL (ref 0.2–1)
BUN SERPL-MCNC: 11 MG/DL (ref 5–25)
CALCIUM SERPL-MCNC: 9.5 MG/DL (ref 8.3–10.1)
CHLORIDE SERPL-SCNC: 103 MMOL/L (ref 100–108)
CO2 SERPL-SCNC: 30 MMOL/L (ref 21–32)
CREAT SERPL-MCNC: 0.85 MG/DL (ref 0.6–1.3)
EOSINOPHIL # BLD AUTO: 0.34 THOUSAND/ΜL (ref 0–0.61)
EOSINOPHIL NFR BLD AUTO: 5 % (ref 0–6)
ERYTHROCYTE [DISTWIDTH] IN BLOOD BY AUTOMATED COUNT: 12.5 % (ref 11.6–15.1)
GFR SERPL CREATININE-BSD FRML MDRD: 117 ML/MIN/1.73SQ M
GLUCOSE SERPL-MCNC: 112 MG/DL (ref 65–140)
HCT VFR BLD AUTO: 43.3 % (ref 36.5–49.3)
HGB BLD-MCNC: 14.1 G/DL (ref 12–17)
LYMPHOCYTES # BLD AUTO: 2.42 THOUSANDS/ΜL (ref 0.6–4.47)
LYMPHOCYTES NFR BLD AUTO: 33 % (ref 14–44)
MCH RBC QN AUTO: 27.3 PG (ref 26.8–34.3)
MCHC RBC AUTO-ENTMCNC: 32.6 G/DL (ref 31.4–37.4)
MCV RBC AUTO: 84 FL (ref 82–98)
MONOCYTES # BLD AUTO: 0.81 THOUSAND/ΜL (ref 0.17–1.22)
MONOCYTES NFR BLD AUTO: 11 % (ref 4–12)
NEUTROPHILS # BLD AUTO: 3.57 THOUSANDS/ΜL (ref 1.85–7.62)
NEUTS SEG NFR BLD AUTO: 49 % (ref 43–75)
NRBC BLD AUTO-RTO: 0 /100 WBCS
PLATELET # BLD AUTO: 349 THOUSANDS/UL (ref 149–390)
PMV BLD AUTO: 9.6 FL (ref 8.9–12.7)
POTASSIUM SERPL-SCNC: 4.8 MMOL/L (ref 3.5–5.3)
PROT SERPL-MCNC: 8.4 G/DL (ref 6.4–8.2)
RBC # BLD AUTO: 5.17 MILLION/UL (ref 3.88–5.62)
SODIUM SERPL-SCNC: 139 MMOL/L (ref 136–145)
WBC # BLD AUTO: 7.24 THOUSAND/UL (ref 4.31–10.16)

## 2017-09-05 PROCEDURE — 36415 COLL VENOUS BLD VENIPUNCTURE: CPT

## 2017-09-05 PROCEDURE — 85025 COMPLETE CBC W/AUTO DIFF WBC: CPT

## 2017-09-05 PROCEDURE — 80053 COMPREHEN METABOLIC PANEL: CPT

## 2017-09-25 ENCOUNTER — GENERIC CONVERSION - ENCOUNTER (OUTPATIENT)
Dept: OTHER | Facility: OTHER | Age: 32
End: 2017-09-25

## 2018-01-12 VITALS
DIASTOLIC BLOOD PRESSURE: 80 MMHG | HEIGHT: 75 IN | BODY MASS INDEX: 36.18 KG/M2 | SYSTOLIC BLOOD PRESSURE: 160 MMHG | WEIGHT: 291 LBS

## 2018-01-12 NOTE — RESULT NOTES
Discussion/Summary   inform wbc wnl   platelets contniue to be elevated will refer to heme     Verified Results  (1) CBC/PLT/DIFF [id number] 10:27AM Melo Diego Order Number: [id number]     Test Name Result Flag Reference   WBC COUNT 8 80 Thousand/uL  4 31-10 16   RBC COUNT 5 04 Million/uL  3 88-5 62   HEMOGLOBIN 13 7 g/dL  12 0-17 0   HEMATOCRIT 42 3 %  36 5-49 3   MCV 84 fL  82-98   MCH 27 2 pg  26 8-34 3   MCHC 32 4 g/dL  31 4-37 4   RDW 12 1 %  11 6-15 1   MPV 8 7 fL L 8 9-12 7   PLATELET COUNT 939 Thousands/uL H 149-390   nRBC AUTOMATED 0 /100 WBCs     NEUTROPHILS RELATIVE PERCENT 59 %  43-75   LYMPHOCYTES RELATIVE PERCENT 27 %  14-44   MONOCYTES RELATIVE PERCENT 8 %  4-12   EOSINOPHILS RELATIVE PERCENT 4 %  0-6   BASOPHILS RELATIVE PERCENT 1 %  0-1   NEUTROPHILS ABSOLUTE COUNT 5 16 Thousands/? ??L  1 85-7 62   LYMPHOCYTES ABSOLUTE COUNT 2 35 Thousands/? ??L  0 60-4 47   MONOCYTES ABSOLUTE COUNT 0 74 Thousand/? ??L  0 17-1 22   EOSINOPHILS ABSOLUTE COUNT 0 39 Thousand/? ??L  0 00-0 61   BASOPHILS ABSOLUTE COUNT 0 11 Thousands/? ??L H 0 00-0 10

## 2018-01-13 VITALS
OXYGEN SATURATION: 97 % | SYSTOLIC BLOOD PRESSURE: 122 MMHG | BODY MASS INDEX: 35.86 KG/M2 | HEIGHT: 76 IN | HEART RATE: 88 BPM | WEIGHT: 294.5 LBS | TEMPERATURE: 97.3 F | DIASTOLIC BLOOD PRESSURE: 78 MMHG

## 2018-01-15 VITALS
WEIGHT: 287.02 LBS | OXYGEN SATURATION: 97 % | DIASTOLIC BLOOD PRESSURE: 74 MMHG | HEIGHT: 76 IN | SYSTOLIC BLOOD PRESSURE: 120 MMHG | HEART RATE: 99 BPM | BODY MASS INDEX: 34.95 KG/M2

## 2018-01-15 NOTE — MISCELLANEOUS
Provider Comments  Provider Comments:   spoke to patient, he will call to reschedule after he speaks to garcia'      Signatures   Electronically signed by : Andrew Purvis, ; Sep 25 2017 10:10AM EST                       (Author)

## 2018-01-16 NOTE — RESULT NOTES
Discussion/Summary   urine essentially noramal with only trace blood ,      Verified Results  (1) CBC/PLT/DIFF [id number] 10:27AM Rosemary Case Order Number: [id number]     Test Name Result Flag Reference   WBC COUNT 8 80 Thousand/uL  4 31-10 16   RBC COUNT 5 04 Million/uL  3 88-5 62   HEMOGLOBIN 13 7 g/dL  12 0-17 0   HEMATOCRIT 42 3 %  36 5-49 3   MCV 84 fL  82-98   MCH 27 2 pg  26 8-34 3   MCHC 32 4 g/dL  31 4-37 4   RDW 12 1 %  11 6-15 1   MPV 8 7 fL L 8 9-12 7   PLATELET COUNT 751 Thousands/uL H 149-390   nRBC AUTOMATED 0 /100 WBCs     NEUTROPHILS RELATIVE PERCENT 59 %  43-75   LYMPHOCYTES RELATIVE PERCENT 27 %  14-44   MONOCYTES RELATIVE PERCENT 8 %  4-12   EOSINOPHILS RELATIVE PERCENT 4 %  0-6   BASOPHILS RELATIVE PERCENT 1 %  0-1   NEUTROPHILS ABSOLUTE COUNT 5 16 Thousands/? ??L  1 85-7 62   LYMPHOCYTES ABSOLUTE COUNT 2 35 Thousands/? ??L  0 60-4 47   MONOCYTES ABSOLUTE COUNT 0 74 Thousand/? ??L  0 17-1 22   EOSINOPHILS ABSOLUTE COUNT 0 39 Thousand/? ??L  0 00-0 61   BASOPHILS ABSOLUTE COUNT 0 11 Thousands/? ??L H 0 00-0 10     (1) COMPREHENSIVE METABOLIC PANEL [id number] 34:11JS Rosemary Case Order Number: [id number]     Test Name Result Flag Reference   SODIUM 136 mmol/L  136-145   POTASSIUM 4 7 mmol/L  3 5-5 3   CHLORIDE 99 mmol/L L 100-108   CARBON DIOXIDE 31 mmol/L  21-32   ANION GAP (CALC) 6 mmol/L  4-13   BLOOD UREA NITROGEN 10 mg/dL  5-25   CREATININE 0 85 mg/dL  0 60-1 30   Standardized to IDMS reference method   CALCIUM 10 2 mg/dL H 8 3-10 1   BILI, TOTAL 0 20 mg/dL  0 20-1 00   ALK PHOSPHATAS 63 U/L     ALT (SGPT) 76 U/L  12-78   Specimen collection should occur prior to Sulfasalazine administration due to the potential for falsely depressed results  AST(SGOT) 49 U/L H 5-45   Specimen collection should occur prior to Sulfasalazine administration due to the potential for falsely depressed results     ALBUMIN 3 4 g/dL L 3 5-5 0   TOTAL PROTEIN 8 9 g/dL H 6 4-8 2 CORRECTED CALCIUM 10 7 mg/dL H 8 3-10 1   eGFR 117 ml/min/1 73sq m     [facility] Disease Education Program recommendations are as follows:  GFR calculation is accurate only with a steady state creatinine  Chronic Kidney disease less than 60 ml/min/1 73 sq  meters  Kidney failure less than 15 ml/min/1 73 sq  meters  GLUCOSE FASTING 135 mg/dL H 65-99   Specimen collection should occur prior to Sulfasalazine administration due to the potential for falsely depressed results  Specimen collection should occur prior to Sulfapyridine administration due to the potential for falsely elevated results       (1) URINALYSIS (will reflex a microscopy if leukocytes, occult blood, protein or nitrites are not within normal limits) [id number] 10:27AM Charu Christian Order Number: [id number]     Test Name Result Flag Reference   COLOR Yellow     CLARITY Clear     SPECIFIC GRAVITY UA 1 025  1 003-1 030   PH UA 5 5  4 5-8 0   LEUKOCYTE ESTERASE UA Negative  Negative   NITRITE UA Negative  Negative   PROTEIN UA Negative mg/dl  Negative   GLUCOSE UA Negative mg/dl  Negative   KETONES UA Negative mg/dl  Negative   UROBILINOGEN UA 0 2 E U /dl  0 2, 1 0 E U /dl   BILIRUBIN UA Negative  Negative   BLOOD UA Trace-Intact A Negative   BACTERIA None Seen /hpf  None Seen, Occasional   EPITHELIAL CELLS Occasional /hpf  None Seen, Occasional   RBC UA 0-1 /hpf A None Seen   WBC UA 0-1 /hpf A None Seen       Plan  Thrombocytosis    · *1 - [facility]  *  Status: Hold For - Scheduling   Requested for: [id number]  Care Summary provided  : Yes

## 2018-01-16 NOTE — PROCEDURES
Procedures by Dasha Shell MD at 2017  5:22 PM      Author:  Dasha Shell MD Service:  Interventional Radiology  Author Type:  Physician    Filed:  2017  5:27 PM Date of Service:  2017  5:22 PM Status:  Signed    :  Dasha Shell MD (Physician)         Procedures  Interventional Radiology Procedure Note    PATIENT NAME: Fanta Berkowitz  : 1985  MRN: [id number]    Procedure: Tube check and repositioned    Estimated Blood Loss: Minimal     Findings: Very small residual left perinephric hematoma  The catheter was repositioned into a small residual pocket that was identified with contrast injection  Output is serosanguineous with no purulence  Unlikely source of fever  Very little  to no output should be expected from this drain  Outpatient appointment will be scheduled in one week for tube check and possible removal  He was instructed to stop catheter flushing      Specimens: None    Complications:  None    Anesthesia: Local    Lucia Aguillon MD     Date: 2017  Time: 5:22 PM           Received for:Harshal Rueda MD  Aug 17 2017  5:28PM Eastern Standard Time

## 2018-01-17 NOTE — RESULT NOTES
Discussion/Summary   inform pt platlets wnl    no need for heme eval      Verified Results  (1) CBC/PLT/DIFF [id number] 11:17AM [facility] Order Number: [id number]     Test Name Result Flag Reference   WBC COUNT 7 24 Thousand/uL  4 31-10 16   RBC COUNT 5 17 Million/uL  3 88-5 62   HEMOGLOBIN 14 1 g/dL  12 0-17 0   HEMATOCRIT 43 3 %  36 5-49 3   MCV 84 fL  82-98   MCH 27 3 pg  26 8-34 3   MCHC 32 6 g/dL  31 4-37 4   RDW 12 5 %  11 6-15 1   MPV 9 6 fL  8 9-12 7   PLATELET COUNT 163 Thousands/uL  149-390   nRBC AUTOMATED 0 /100 WBCs     NEUTROPHILS RELATIVE PERCENT 49 %  43-75   LYMPHOCYTES RELATIVE PERCENT 33 %  14-44   MONOCYTES RELATIVE PERCENT 11 %  4-12   EOSINOPHILS RELATIVE PERCENT 5 %  0-6   BASOPHILS RELATIVE PERCENT 1 %  0-1   NEUTROPHILS ABSOLUTE COUNT 3 57 Thousands/? ??L  1 85-7 62   LYMPHOCYTES ABSOLUTE COUNT 2 42 Thousands/? ??L  0 60-4 47   MONOCYTES ABSOLUTE COUNT 0 81 Thousand/? ??L  0 17-1 22   EOSINOPHILS ABSOLUTE COUNT 0 34 Thousand/? ??L  0 00-0 61   BASOPHILS ABSOLUTE COUNT 0 08 Thousands/? ??L  0 00-0 10     (1) COMPREHENSIVE METABOLIC PANEL [id number] 08:95GX [facility] Order Number: [id number]     Test Name Result Flag Reference   GLUCOSE,RANDM 112 mg/dL     If the patient is fasting, the ADA then defines impaired fasting glucose as > 100 mg/dL and diabetes as > or equal to 123 mg/dL  Specimen collection should occur prior to Sulfasalazine administration due to the potential for falsely depressed results  Specimen collection should occur prior to Sulfapyridine administration due to the potential for falsely elevated results     SODIUM 139 mmol/L  136-145   POTASSIUM 4 8 mmol/L  3 5-5 3   CHLORIDE 103 mmol/L  100-108   CARBON DIOXIDE 30 mmol/L  21-32   ANION GAP (CALC) 6 mmol/L  4-13   BLOOD UREA NITROGEN 11 mg/dL  5-25   CREATININE 0 85 mg/dL  0 60-1 30   Standardized to IDMS reference method   CALCIUM 9 5 mg/dL  8 3-10 1   BILI, TOTAL 0 20 mg/dL 0  20-1 00   ALK PHOSPHATAS 52 U/L     ALT (SGPT) 68 U/L  12-78   Specimen collection should occur prior to Sulfasalazine administration due to the potential for falsely depressed results  AST(SGOT) 40 U/L  5-45   Specimen collection should occur prior to Sulfasalazine administration due to the potential for falsely depressed results  ALBUMIN 3 7 g/dL  3 5-5 0   TOTAL PROTEIN 8 4 g/dL H 6 4-8 2   eGFR 117 ml/min/1 73sq [facility] Disease Education Program recommendations are as follows:  GFR calculation is accurate only with a steady state creatinine  Chronic Kidney disease less than 60 ml/min/1 73 sq  meters  Kidney failure less than 15 ml/min/1 73 sq  meters

## 2018-03-25 ENCOUNTER — APPOINTMENT (EMERGENCY)
Dept: RADIOLOGY | Facility: HOSPITAL | Age: 33
End: 2018-03-25
Payer: COMMERCIAL

## 2018-03-25 ENCOUNTER — HOSPITAL ENCOUNTER (EMERGENCY)
Facility: HOSPITAL | Age: 33
Discharge: HOME | End: 2018-03-25
Attending: EMERGENCY MEDICINE | Admitting: EMERGENCY MEDICINE
Payer: COMMERCIAL

## 2018-03-25 VITALS
TEMPERATURE: 98.8 F | OXYGEN SATURATION: 100 % | RESPIRATION RATE: 20 BRPM | DIASTOLIC BLOOD PRESSURE: 68 MMHG | BODY MASS INDEX: 39.5 KG/M2 | WEIGHT: 315 LBS | SYSTOLIC BLOOD PRESSURE: 132 MMHG | HEART RATE: 90 BPM

## 2018-03-25 DIAGNOSIS — J40 BRONCHITIS: Primary | ICD-10-CM

## 2018-03-25 LAB
ALBUMIN SERPL BCP-MCNC: 3.3 G/DL (ref 3.5–5)
ALP SERPL-CCNC: 65 U/L (ref 46–116)
ALT SERPL W P-5'-P-CCNC: 100 U/L (ref 12–78)
ANION GAP SERPL CALCULATED.3IONS-SCNC: 10 MMOL/L (ref 4–13)
AST SERPL W P-5'-P-CCNC: 70 U/L (ref 5–45)
BASOPHILS # BLD AUTO: 0.09 THOUSANDS/ΜL (ref 0–0.1)
BASOPHILS NFR BLD AUTO: 1 % (ref 0–1)
BILIRUB SERPL-MCNC: 0.6 MG/DL (ref 0.2–1)
BUN SERPL-MCNC: 8 MG/DL (ref 5–25)
CALCIUM SERPL-MCNC: 8.7 MG/DL (ref 8.3–10.1)
CHLORIDE SERPL-SCNC: 100 MMOL/L (ref 100–108)
CO2 SERPL-SCNC: 28 MMOL/L (ref 21–32)
CREAT SERPL-MCNC: 0.82 MG/DL (ref 0.6–1.3)
EOSINOPHIL # BLD AUTO: 0.34 THOUSAND/ΜL (ref 0–0.61)
EOSINOPHIL NFR BLD AUTO: 3 % (ref 0–6)
ERYTHROCYTE [DISTWIDTH] IN BLOOD BY AUTOMATED COUNT: 12.2 % (ref 11.6–15.1)
GFR SERPL CREATININE-BSD FRML MDRD: 118 ML/MIN/1.73SQ M
GLUCOSE SERPL-MCNC: 156 MG/DL (ref 65–140)
HCT VFR BLD AUTO: 41.4 % (ref 36.5–49.3)
HGB BLD-MCNC: 13.9 G/DL (ref 12–17)
LYMPHOCYTES # BLD AUTO: 2.42 THOUSANDS/ΜL (ref 0.6–4.47)
LYMPHOCYTES NFR BLD AUTO: 24 % (ref 14–44)
MCH RBC QN AUTO: 28.1 PG (ref 26.8–34.3)
MCHC RBC AUTO-ENTMCNC: 33.6 G/DL (ref 31.4–37.4)
MCV RBC AUTO: 84 FL (ref 82–98)
MONOCYTES # BLD AUTO: 0.83 THOUSAND/ΜL (ref 0.17–1.22)
MONOCYTES NFR BLD AUTO: 8 % (ref 4–12)
NEUTROPHILS # BLD AUTO: 6.41 THOUSANDS/ΜL (ref 1.85–7.62)
NEUTS SEG NFR BLD AUTO: 63 % (ref 43–75)
NRBC BLD AUTO-RTO: 0 /100 WBCS
PLATELET # BLD AUTO: 290 THOUSANDS/UL (ref 149–390)
PMV BLD AUTO: 9.8 FL (ref 8.9–12.7)
POTASSIUM SERPL-SCNC: 4.2 MMOL/L (ref 3.5–5.3)
PROT SERPL-MCNC: 7.9 G/DL (ref 6.4–8.2)
RBC # BLD AUTO: 4.94 MILLION/UL (ref 3.88–5.62)
SODIUM SERPL-SCNC: 138 MMOL/L (ref 136–145)
WBC # BLD AUTO: 10.13 THOUSAND/UL (ref 4.31–10.16)

## 2018-03-25 PROCEDURE — 99285 EMERGENCY DEPT VISIT HI MDM: CPT

## 2018-03-25 PROCEDURE — 71046 X-RAY EXAM CHEST 2 VIEWS: CPT

## 2018-03-25 PROCEDURE — 36415 COLL VENOUS BLD VENIPUNCTURE: CPT | Performed by: EMERGENCY MEDICINE

## 2018-03-25 PROCEDURE — 80053 COMPREHEN METABOLIC PANEL: CPT | Performed by: EMERGENCY MEDICINE

## 2018-03-25 PROCEDURE — 85025 COMPLETE CBC W/AUTO DIFF WBC: CPT | Performed by: EMERGENCY MEDICINE

## 2018-03-25 PROCEDURE — 93005 ELECTROCARDIOGRAM TRACING: CPT

## 2018-03-25 PROCEDURE — 96374 THER/PROPH/DIAG INJ IV PUSH: CPT

## 2018-03-25 PROCEDURE — 94640 AIRWAY INHALATION TREATMENT: CPT

## 2018-03-25 PROCEDURE — 96361 HYDRATE IV INFUSION ADD-ON: CPT

## 2018-03-25 RX ORDER — IPRATROPIUM BROMIDE AND ALBUTEROL SULFATE 2.5; .5 MG/3ML; MG/3ML
3 SOLUTION RESPIRATORY (INHALATION)
Status: DISCONTINUED | OUTPATIENT
Start: 2018-03-25 | End: 2018-03-25

## 2018-03-25 RX ORDER — AZITHROMYCIN 250 MG/1
250 TABLET, FILM COATED ORAL EVERY 24 HOURS
Qty: 6 TABLET | Refills: 0 | Status: SHIPPED | OUTPATIENT
Start: 2018-03-25 | End: 2018-03-30

## 2018-03-25 RX ORDER — IPRATROPIUM BROMIDE AND ALBUTEROL SULFATE 2.5; .5 MG/3ML; MG/3ML
3 SOLUTION RESPIRATORY (INHALATION) ONCE
Status: COMPLETED | OUTPATIENT
Start: 2018-03-25 | End: 2018-03-25

## 2018-03-25 RX ORDER — ONDANSETRON 2 MG/ML
4 INJECTION INTRAMUSCULAR; INTRAVENOUS ONCE
Status: COMPLETED | OUTPATIENT
Start: 2018-03-25 | End: 2018-03-25

## 2018-03-25 RX ORDER — MONTELUKAST SODIUM 10 MG/1
10 TABLET ORAL
COMMUNITY

## 2018-03-25 RX ORDER — PREDNISONE 20 MG/1
60 TABLET ORAL DAILY
Qty: 15 TABLET | Refills: 0 | Status: SHIPPED | OUTPATIENT
Start: 2018-03-25 | End: 2018-03-30

## 2018-03-25 RX ORDER — ALBUTEROL SULFATE 90 UG/1
2 AEROSOL, METERED RESPIRATORY (INHALATION) EVERY 6 HOURS PRN
COMMUNITY

## 2018-03-25 RX ORDER — PREDNISONE 20 MG/1
60 TABLET ORAL ONCE
Status: COMPLETED | OUTPATIENT
Start: 2018-03-25 | End: 2018-03-25

## 2018-03-25 RX ORDER — ALBUTEROL SULFATE 2.5 MG/3ML
2.5 SOLUTION RESPIRATORY (INHALATION) EVERY 6 HOURS PRN
Qty: 75 ML | Refills: 0 | Status: SHIPPED | OUTPATIENT
Start: 2018-03-25

## 2018-03-25 RX ADMIN — ONDANSETRON 4 MG: 2 INJECTION INTRAMUSCULAR; INTRAVENOUS at 12:14

## 2018-03-25 RX ADMIN — IPRATROPIUM BROMIDE AND ALBUTEROL SULFATE 3 ML: .5; 3 SOLUTION RESPIRATORY (INHALATION) at 12:16

## 2018-03-25 RX ADMIN — PREDNISONE 60 MG: 20 TABLET ORAL at 12:13

## 2018-03-25 RX ADMIN — SODIUM CHLORIDE 1000 ML: 0.9 INJECTION, SOLUTION INTRAVENOUS at 11:56

## 2018-03-25 NOTE — DISCHARGE INSTRUCTIONS
Acute Bronchitis   WHAT YOU NEED TO KNOW:   What is acute bronchitis? Acute bronchitis is swelling and irritation in the air passages of your lungs  This irritation may cause you to cough or have other breathing problems  Acute bronchitis often starts because of another illness, such as a cold or the flu  The illness spreads from your nose and throat to your windpipe and airways  Bronchitis is often called a chest cold  Acute bronchitis lasts about 3 to 6 weeks and is usually not a serious illness  What causes acute bronchitis? · Infection  caused by a virus, bacteria, or a fungus    · Polluted air  caused by chemical fumes, dust, smoke, allergens, or pollution  What increases my risk for acute bronchitis? · Age, usually older adults    · Smoking cigarettes or being around cigarette smoke    · Chronic lung diseases or chronic sinus infections    · Weakened immune system    · Gastroesophageal reflux disease    · Allergies and environmental changes  What are the signs and symptoms of acute bronchitis? · A cough with sputum that may be clear, yellow, or green    · Feeling more tired than usual, and body aches    · A fever and chills    · Wheezing when you breathe    · A tight chest or pain when you breathe or cough  How is acute bronchitis diagnosed? Your healthcare provider may diagnose bronchitis by your symptoms  If he is not sure, you may need the following:  · Blood tests  will be done to see if your symptoms are caused by an infection  · X-ray  pictures of your lungs and heart may show signs of infection, such as pneumonia  Chest x-rays may also show fluid around your heart and lungs  How is acute bronchitis treated? Your healthcare provider will treat any condition that has caused your acute bronchitis  He may also give you any of the following:  · Ibuprofen or acetaminophen  are medicines that help lower your fever  They are available without a doctor's order   Ask your healthcare provider which medicine is right for you  Ask how much to take and how often to take it  Follow directions  These medicines can cause stomach bleeding if not taken correctly  Ibuprofen can cause kidney damage  Do not take ibuprofen if you have kidney disease, an ulcer, or allergies to aspirin  Acetaminophen can cause liver damage  Do not take more than 4,000 milligrams in 24 hours  · Decongestants  help loosen mucus in your lungs and make it easier to cough up  This can help you breathe easier  · Cough suppressants  decrease your urge to cough  If your cough produces mucus, do not take a cough suppressant unless your healthcare provider tells you to  Your healthcare provider may suggest that you take a cough suppressant at night so you can rest     · Inhalers  may be given  Your healthcare provider may give you one or more inhalers to help you breathe easier and cough less  An inhaler gives your medicine to open your airways  Ask your healthcare provider to show you how to use your inhaler correctly  How can I care for myself when I have acute bronchitis? · Get more rest   Rest helps your body to heal  Slowly start to do more each day  Rest when you feel it is needed  · Avoid irritants in the air  Avoid chemicals, fumes, and dust  Wear a face mask if you must work around dust or fumes  Stay inside on days when air pollution levels are high  If you have allergies, stay inside when pollen counts are high  Do not use aerosol products, such as spray-on deodorant, bug spray, and hair spray  · Do not smoke or be around others who smoke  Nicotine and other chemicals in cigarettes and cigars damages the cilia that move mucus out of your lungs  Ask your healthcare provider for information if you currently smoke and need help to quit  E-cigarettes or smokeless tobacco still contain nicotine  Talk to your healthcare provider before you use these products  · Drink liquids as directed    Liquids help keep your air passages moist and help you cough up mucus  You may need to drink more liquids when you have acute bronchitis  Ask how much liquid to drink each day and which liquids are best for you  · Use a humidifier or vaporizer  Use a cool mist humidifier or a vaporizer to increase air moisture in your home  This may make it easier for you to breathe and help decrease your cough  How can I decrease my risk for acute bronchitis? · Get the vaccinations you need  Ask your healthcare provider if you should get vaccinated against the flu or pneumonia  · Prevent the spread of germs  You can decrease your risk of acute bronchitis and other illnesses by doing the following:     [facility] [place] often with soap and water  Carry germ-killing hand lotion or gel with you  You can use the lotion or gel to clean your hands when soap and water are not available  ¨ Do not touch your eyes, nose, or mouth unless you have washed your hands first     ¨ Always cover your mouth when you cough to prevent the spread of germs  It is best to cough into a tissue or your shirt sleeve instead of into your hand  Ask those around you cover their mouths when they cough  ¨ Try to avoid people who have a cold or the flu  If you are sick, stay away from others as much as possible  When should I seek immediate care? · You cough up blood  · Your lips or fingernails turn blue  · You feel like you are not getting enough air when you breathe  When should I contact my healthcare provider? · You have a fever  · Your breathing problems do not go away or get worse  · Your cough does not get better within 4 weeks  · You have questions or concerns about your condition or care  CARE AGREEMENT:   You have the right to help plan your care  Learn about your health condition and how it may be treated  Discuss treatment options with your caregivers to decide what care you want to receive  You always have the right to refuse treatment  The above information is an  only  It is not intended as medical advice for individual conditions or treatments  Talk to your doctor, nurse or pharmacist before following any medical regimen to see if it is safe and effective for you  © 2017 [employer] Information is for End User's use only and may not be sold, redistributed or otherwise used for commercial purposes  All illustrations and images included in CareNotes® are the copyrighted property of A [employer]  or [employer]

## 2018-03-25 NOTE — ED PROVIDER NOTES
Pt Name: Lore Munoz  MRN: [id number]  [place] 1985  Age/Sex: 34 y o  male  Date of evaluation: 3/25/2018  PCP: Joni Mahoney MD    [address] [place] [address]    Chief Complaint   Patient presents with    Shortness of Breath     patient presents to the ED with c/o SOB for over the last week and a half  patient denies CP          HPI    Lizzy Jackson presents to the Emergency Department complaining of cough, congestion, body aches, fatigue  Patient feels that this is similar to illnesses that he has had in the past   He has been using his albuterol inhaler and it is not helping  History provided by:  Patient  Cough   Cough characteristics:  Harsh  Sputum characteristics:  Nondescript  Severity:  Mild  Timing:  Constant  Progression:  Worsening  Chronicity:  New  Relieved by:  Nothing  Worsened by:  Nothing  Associated symptoms: fever, sore throat and wheezing    Associated symptoms: no chest pain, no chills, no headaches, no rash, no rhinorrhea and no shortness of breath          Past Medical and Surgical History    Past Medical History:   Diagnosis Date    Asthma     Hypertension     Nephrolithiasis        Past Surgical History:   Procedure Laterality Date    DENTAL SURGERY      [place] CYSTO/URETERO W/LITHOTRIPSY &INDWELL STENT INSRT Left 7/26/2017    Procedure: CYSTOSCOPY URETEROSCOPY WITH LITHOTRIPSY HOLMIUM LASER, RETROGRADE PYELOGRAM AND INSERTION STENT URETERAL;  Surgeon: Andre Vivar MD;  Location: [facility];  Service: Urology       Family History   Problem Relation Age of Onset    Diabetes Father     Hypertension Father     Diabetes Maternal Grandfather        Social History   Substance Use Topics    Smoking status: Never Smoker    Smokeless tobacco: Never Used    Alcohol use Yes      Comment: OCCASIONALLY               Allergies    Allergies   Allergen Reactions    Amoxicillin Hives    Penicillins Hives       Home Medications    Prior to Admission medications    Medication Sig Start Date End Date Taking? Authorizing Provider   RONALD RAE PO Take 60 mL by mouth daily    Historical Provider, MD           Review of Systems    Review of Systems   Constitutional: Positive for fatigue and fever  Negative for activity change, appetite change and chills  HENT: Positive for congestion, sinus pressure and sore throat  Negative for rhinorrhea, sneezing and trouble swallowing  Eyes: Negative for photophobia and visual disturbance  Respiratory: Positive for cough and wheezing  Negative for chest tightness and shortness of breath  Cardiovascular: Negative for chest pain and leg swelling  Gastrointestinal: Negative for abdominal distention, abdominal pain, constipation, diarrhea, nausea and vomiting  Endocrine: Negative for polydipsia, polyphagia and polyuria  Genitourinary: Negative for decreased urine volume, difficulty urinating, dysuria, flank pain, frequency and urgency  Musculoskeletal: Negative for back pain, gait problem, joint swelling and neck pain  Skin: Negative for color change, pallor and rash  Allergic/Immunologic: Negative for immunocompromised state  Neurological: Negative for seizures, syncope, speech difficulty, weakness, light-headedness and headaches  Psychiatric/Behavioral: Negative for confusion  All other systems reviewed and are negative  Physical Exam      ED Triage Vitals   Temperature Pulse Respirations Blood Pressure SpO2   03/25/18 1131 03/25/18 1131 03/25/18 1131 03/25/18 1131 03/25/18 1131   98 8 °F (37 1 °C) 95 18 144/87 95 %      Temp src Heart Rate Source Patient Position - Orthostatic VS BP Location FiO2 (%)   -- 03/25/18 1254 03/25/18 1254 03/25/18 1254 --    Monitor Sitting Left arm       Pain Score       03/25/18 1254       No Pain               Physical Exam   Constitutional: He is oriented to person, place, and time  He appears well-developed and well-nourished  No distress  HENT:   Head: Normocephalic and atraumatic     Nose: Nose normal  Mouth/Throat: Oropharynx is clear and moist    Eyes: Conjunctivae and EOM are normal  Pupils are equal, round, and reactive to light  Neck: Normal range of motion  Neck supple  Cardiovascular: Normal rate, regular rhythm and normal heart sounds  Exam reveals no gallop and no friction rub  No murmur heard  Pulmonary/Chest: Effort normal and breath sounds normal  No respiratory distress  He has no wheezes  He has no rales  Abdominal: Soft  Bowel sounds are normal  There is no tenderness  There is no rebound and no guarding  Musculoskeletal: Normal range of motion  Neurological: He is alert and oriented to person, place, and time  Skin: Skin is warm and dry  He is not diaphoretic  Psychiatric: He has a normal mood and affect  His behavior is normal    Nursing note and vitals reviewed  Assessment and Plan    Marilia Diaz is a 34 y o  male who presents with cough  Physical examination unremarkable  Differential diagnosis (not completely inclusive) includes viral vs bacterial causes of illness  Plan will be to perform diagnostic testing and treat symptomatically        MDM    Diagnostic Results    Labs:    Results for orders placed or performed during the hospital encounter of 03/25/18   CBC and differential   Result Value Ref Range    WBC 10 13 4 31 - 10 16 Thousand/uL    RBC 4 94 3 88 - 5 62 Million/uL    Hemoglobin 13 9 12 0 - 17 0 g/dL    Hematocrit 41 4 36 5 - 49 3 %    MCV 84 82 - 98 fL    MCH 28 1 26 8 - 34 3 pg    MCHC 33 6 31 4 - 37 4 g/dL    RDW 12 2 11 6 - 15 1 %    MPV 9 8 8 9 - 12 7 fL    Platelets 128 047 - 287 Thousands/uL    nRBC 0 /100 WBCs    Neutrophils Relative 63 43 - 75 %    Lymphocytes Relative 24 14 - 44 %    Monocytes Relative 8 4 - 12 %    Eosinophils Relative 3 0 - 6 %    Basophils Relative 1 0 - 1 %    Neutrophils Absolute 6 41 1 85 - 7 62 Thousands/µL    Lymphocytes Absolute 2 42 0 60 - 4 47 Thousands/µL    Monocytes Absolute 0 83 0 17 - 1 22 Thousand/µL Eosinophils Absolute 0 34 0 00 - 0 61 Thousand/µL    Basophils Absolute 0 09 0 00 - 0 10 Thousands/µL   Comprehensive metabolic panel   Result Value Ref Range    Sodium 138 136 - 145 mmol/L    Potassium 4 2 3 5 - 5 3 mmol/L    Chloride 100 100 - 108 mmol/L    CO2 28 21 - 32 mmol/L    Anion Gap 10 4 - 13 mmol/L    BUN 8 5 - 25 mg/dL    Creatinine 0 82 0 60 - 1 30 mg/dL    Glucose 156 (H) 65 - 140 mg/dL    Calcium 8 7 8 3 - 10 1 mg/dL    AST 70 (H) 5 - 45 U/L     (H) 12 - 78 U/L    Alkaline Phosphatase 65 46 - 116 U/L    Total Protein 7 9 6 4 - 8 2 g/dL    Albumin 3 3 (L) 3 5 - 5 0 g/dL    Total Bilirubin 0 60 0 20 - 1 00 mg/dL    eGFR 118 ml/min/1 73sq m       All labs reviewed and utilized in the medical decision making process    Radiology:    XR chest 2 views   Final Result      No acute cardiopulmonary disease  Workstation performed: [id number]             All radiology studies independently viewed by me and interpreted by the radiologist     Procedure    Procedures    CritCare Time      ED Course of Care and Re-Assessments        Medications   sodium chloride 0 9 % bolus 1,000 mL (0 mL Intravenous Stopped 3/25/18 1338)   ondansetron (ZOFRAN) injection 4 mg (4 mg Intravenous Given 3/25/18 1214)   predniSONE tablet 60 mg (60 mg Oral Given 3/25/18 1213)   ipratropium-albuterol (DUO-NEB) 0 5-2 5 mg/3 mL inhalation solution 3 mL (3 mL Nebulization Given 3/25/18 1216)           FINAL IMPRESSION    Final diagnoses:   Bronchitis         DISPOSITION/PLAN    Time reflects when diagnosis was documented in both MDM as applicable and the Disposition within this note     Time User Action Codes Description Comment    3/25/2018  2:10 PM Love Degree Add [J40] Bronchitis       ED Disposition     ED Disposition Condition Comment    Discharge  Hershal Dies discharge to home/self care      Condition at discharge: Good        Follow-up Information     Follow up With Specialties Details Why Contact Info Additional Information    Padilla Bhat MD Family Medicine Schedule an appointment as soon as possible for a visit  [address] [place] [address] [facility]       [address] [facility] Emergency Department Emergency Medicine Go to As needed, If symptoms worsen [address]  [phone] [place] [facility], [address] [place], [place], [address]            PATIENT REFERRED TO:    Padilla Bhat MD  [address] [facility]  [phone]    Schedule an appointment as soon as possible for a visit      [address] [facility] Emergency Department  [address] [facility] [address]  [phone]  Go to  As needed, If symptoms worsen      DISCHARGE MEDICATIONS:    Discharge Medication List as of 3/25/2018  2:14 PM      START taking these medications    Details   albuterol (2 5 mg/3 mL) 0 083 % nebulizer solution Take 3 mL (2 5 mg total) by nebulization every 6 (six) hours as needed for wheezing, Starting Sun 3/25/2018, Print      azithromycin (ZITHROMAX) 250 mg tablet Take 1 tablet (250 mg total) by mouth every 24 hours for 5 days Take two tabs on day one and one tab on days 2-5, Starting Sun 3/25/2018, Until Fri 3/30/2018, Print      predniSONE 20 mg tablet Take 3 tablets (60 mg total) by mouth daily for 5 days, Starting Sun 3/25/2018, Until Fri 3/30/2018, Print         CONTINUE these medications which have NOT CHANGED    Details   albuterol (PROVENTIL HFA,VENTOLIN HFA) 90 mcg/act inhaler Inhale 2 puffs every 6 (six) hours as needed for wheezing, Historical Med      montelukast (SINGULAIR) 10 mg tablet Take 10 mg by mouth daily at bedtime, Historical Med             No discharge procedures on file           DO Ruiz Guevara DO  03/25/18 3852

## 2018-03-26 LAB
ATRIAL RATE: 91 BPM
P AXIS: 52 DEGREES
PR INTERVAL: 162 MS
QRS AXIS: 46 DEGREES
QRSD INTERVAL: 72 MS
QT INTERVAL: 332 MS
QTC INTERVAL: 408 MS
T WAVE AXIS: 45 DEGREES
VENTRICULAR RATE: 91 BPM

## 2018-03-26 PROCEDURE — 93010 ELECTROCARDIOGRAM REPORT: CPT | Performed by: INTERNAL MEDICINE

## 2018-09-05 ENCOUNTER — HOSPITAL ENCOUNTER (EMERGENCY)
Facility: HOSPITAL | Age: 33
Discharge: HOME | End: 2018-09-06
Attending: EMERGENCY MEDICINE | Admitting: EMERGENCY MEDICINE

## 2018-09-05 DIAGNOSIS — N20.0 KIDNEY STONES: ICD-10-CM

## 2018-09-05 DIAGNOSIS — R11.0 NAUSEA: ICD-10-CM

## 2018-09-05 DIAGNOSIS — R10.9 LEFT FLANK PAIN: Primary | ICD-10-CM

## 2018-09-05 DIAGNOSIS — R10.9 ABDOMINAL PAIN: ICD-10-CM

## 2018-09-05 DIAGNOSIS — R19.7 DIARRHEA: ICD-10-CM

## 2018-09-05 RX ORDER — ONDANSETRON 2 MG/ML
4 INJECTION INTRAMUSCULAR; INTRAVENOUS ONCE
Status: COMPLETED | OUTPATIENT
Start: 2018-09-05 | End: 2018-09-06

## 2018-09-05 RX ORDER — KETOROLAC TROMETHAMINE 30 MG/ML
15 INJECTION, SOLUTION INTRAMUSCULAR; INTRAVENOUS ONCE
Status: COMPLETED | OUTPATIENT
Start: 2018-09-05 | End: 2018-09-06

## 2018-09-06 ENCOUNTER — APPOINTMENT (EMERGENCY)
Dept: CT IMAGING | Facility: HOSPITAL | Age: 33
End: 2018-09-06

## 2018-09-06 VITALS
WEIGHT: 315 LBS | SYSTOLIC BLOOD PRESSURE: 148 MMHG | RESPIRATION RATE: 18 BRPM | HEIGHT: 76 IN | OXYGEN SATURATION: 96 % | BODY MASS INDEX: 38.36 KG/M2 | TEMPERATURE: 98 F | HEART RATE: 78 BPM | DIASTOLIC BLOOD PRESSURE: 88 MMHG

## 2018-09-06 LAB
ALBUMIN SERPL BCP-MCNC: 3.6 G/DL (ref 3.5–5)
ALP SERPL-CCNC: 59 U/L (ref 46–116)
ALT SERPL W P-5'-P-CCNC: 41 U/L (ref 12–78)
ANION GAP SERPL CALCULATED.3IONS-SCNC: 7 MMOL/L (ref 4–13)
AST SERPL W P-5'-P-CCNC: 36 U/L (ref 5–45)
BASOPHILS # BLD AUTO: 0.05 THOUSANDS/ΜL (ref 0–0.1)
BASOPHILS NFR BLD AUTO: 1 % (ref 0–1)
BILIRUB SERPL-MCNC: 0.4 MG/DL (ref 0.2–1)
BILIRUB UR QL STRIP: NEGATIVE
BUN SERPL-MCNC: 14 MG/DL (ref 5–25)
CALCIUM SERPL-MCNC: 9.1 MG/DL (ref 8.3–10.1)
CHLORIDE SERPL-SCNC: 103 MMOL/L (ref 100–108)
CLARITY UR: CLEAR
CO2 SERPL-SCNC: 30 MMOL/L (ref 21–32)
COLOR UR: YELLOW
CREAT SERPL-MCNC: 0.88 MG/DL (ref 0.6–1.3)
DEPRECATED D DIMER PPP: 325 NG/ML (FEU) (ref 0–424)
EOSINOPHIL # BLD AUTO: 0.59 THOUSAND/ΜL (ref 0–0.61)
EOSINOPHIL NFR BLD AUTO: 7 % (ref 0–6)
ERYTHROCYTE [DISTWIDTH] IN BLOOD BY AUTOMATED COUNT: 12.4 % (ref 11.6–15.1)
GFR SERPL CREATININE-BSD FRML MDRD: 114 ML/MIN/1.73SQ M
GLUCOSE SERPL-MCNC: 91 MG/DL (ref 65–140)
GLUCOSE UR STRIP-MCNC: NEGATIVE MG/DL
HCT VFR BLD AUTO: 42.4 % (ref 36.5–49.3)
HGB BLD-MCNC: 14.1 G/DL (ref 12–17)
HGB UR QL STRIP.AUTO: NEGATIVE
IMM GRANULOCYTES # BLD AUTO: 0.02 THOUSAND/UL (ref 0–0.2)
IMM GRANULOCYTES NFR BLD AUTO: 0 % (ref 0–2)
KETONES UR STRIP-MCNC: NEGATIVE MG/DL
LACTATE SERPL-SCNC: 0.5 MMOL/L (ref 0.5–2)
LEUKOCYTE ESTERASE UR QL STRIP: NEGATIVE
LIPASE SERPL-CCNC: 186 U/L (ref 73–393)
LYMPHOCYTES # BLD AUTO: 2.68 THOUSANDS/ΜL (ref 0.6–4.47)
LYMPHOCYTES NFR BLD AUTO: 31 % (ref 14–44)
MCH RBC QN AUTO: 27.7 PG (ref 26.8–34.3)
MCHC RBC AUTO-ENTMCNC: 33.3 G/DL (ref 31.4–37.4)
MCV RBC AUTO: 83 FL (ref 82–98)
MONOCYTES # BLD AUTO: 0.95 THOUSAND/ΜL (ref 0.17–1.22)
MONOCYTES NFR BLD AUTO: 11 % (ref 4–12)
NEUTROPHILS # BLD AUTO: 4.38 THOUSANDS/ΜL (ref 1.85–7.62)
NEUTS SEG NFR BLD AUTO: 50 % (ref 43–75)
NITRITE UR QL STRIP: NEGATIVE
NRBC BLD AUTO-RTO: 0 /100 WBCS
PH UR STRIP.AUTO: 6 [PH] (ref 4.5–8)
PLATELET # BLD AUTO: 365 THOUSANDS/UL (ref 149–390)
PMV BLD AUTO: 10 FL (ref 8.9–12.7)
POTASSIUM SERPL-SCNC: 4.3 MMOL/L (ref 3.5–5.3)
PROT SERPL-MCNC: 7.9 G/DL (ref 6.4–8.2)
PROT UR STRIP-MCNC: NEGATIVE MG/DL
RBC # BLD AUTO: 5.09 MILLION/UL (ref 3.88–5.62)
SODIUM SERPL-SCNC: 140 MMOL/L (ref 136–145)
SP GR UR STRIP.AUTO: 1.02 (ref 1–1.03)
TROPONIN I SERPL-MCNC: <0.02 NG/ML
UROBILINOGEN UR QL STRIP.AUTO: 0.2 E.U./DL
WBC # BLD AUTO: 8.67 THOUSAND/UL (ref 4.31–10.16)

## 2018-09-06 PROCEDURE — 99284 EMERGENCY DEPT VISIT MOD MDM: CPT

## 2018-09-06 PROCEDURE — 85379 FIBRIN DEGRADATION QUANT: CPT | Performed by: EMERGENCY MEDICINE

## 2018-09-06 PROCEDURE — 96374 THER/PROPH/DIAG INJ IV PUSH: CPT

## 2018-09-06 PROCEDURE — 36415 COLL VENOUS BLD VENIPUNCTURE: CPT | Performed by: EMERGENCY MEDICINE

## 2018-09-06 PROCEDURE — 83605 ASSAY OF LACTIC ACID: CPT | Performed by: EMERGENCY MEDICINE

## 2018-09-06 PROCEDURE — 81003 URINALYSIS AUTO W/O SCOPE: CPT | Performed by: EMERGENCY MEDICINE

## 2018-09-06 PROCEDURE — 80053 COMPREHEN METABOLIC PANEL: CPT | Performed by: EMERGENCY MEDICINE

## 2018-09-06 PROCEDURE — 84484 ASSAY OF TROPONIN QUANT: CPT | Performed by: EMERGENCY MEDICINE

## 2018-09-06 PROCEDURE — 74177 CT ABD & PELVIS W/CONTRAST: CPT

## 2018-09-06 PROCEDURE — 93005 ELECTROCARDIOGRAM TRACING: CPT

## 2018-09-06 PROCEDURE — 96361 HYDRATE IV INFUSION ADD-ON: CPT

## 2018-09-06 PROCEDURE — 83690 ASSAY OF LIPASE: CPT | Performed by: EMERGENCY MEDICINE

## 2018-09-06 PROCEDURE — 96375 TX/PRO/DX INJ NEW DRUG ADDON: CPT

## 2018-09-06 PROCEDURE — 85025 COMPLETE CBC W/AUTO DIFF WBC: CPT | Performed by: EMERGENCY MEDICINE

## 2018-09-06 RX ORDER — ONDANSETRON 4 MG/1
4 TABLET, ORALLY DISINTEGRATING ORAL EVERY 8 HOURS PRN
Qty: 10 TABLET | Refills: 0 | Status: SHIPPED | OUTPATIENT
Start: 2018-09-06

## 2018-09-06 RX ORDER — DICYCLOMINE HCL 20 MG
20 TABLET ORAL 2 TIMES DAILY PRN
Qty: 20 TABLET | Refills: 0 | Status: SHIPPED | OUTPATIENT
Start: 2018-09-06 | End: 2020-03-03

## 2018-09-06 RX ADMIN — IOHEXOL 100 ML: 350 INJECTION, SOLUTION INTRAVENOUS at 01:30

## 2018-09-06 RX ADMIN — KETOROLAC TROMETHAMINE 15 MG: 30 INJECTION, SOLUTION INTRAMUSCULAR at 00:02

## 2018-09-06 RX ADMIN — ONDANSETRON 4 MG: 2 INJECTION INTRAMUSCULAR; INTRAVENOUS at 00:02

## 2018-09-06 RX ADMIN — SODIUM CHLORIDE 1000 ML: 0.9 INJECTION, SOLUTION INTRAVENOUS at 02:00

## 2018-09-06 NOTE — ED PROVIDER NOTES
History  Chief Complaint   Patient presents with    Flank Pain     Patient c/o left sided flank pain that started 3 days ago  Patient states"he is having nausea and diarrhea"  34year-old male presents with 3 days of left sided flank pain without radiation associated with dysuria and hematuria  Patient describes moderate stabbing pain that came on gradually, worsening and continues in the ER  Patient states breathing aggravates the pain and nothing alleviates it  Patient has a history of kidney stones, states this feels similar to prior episodes  Patient has a history of perinephric abscess with sepsis requiring lithotripsy  ROS: Patient associates nausea without vomiting and diarrhea yesterday but none today; denies fever/chills, dyspnea, anorexia, constipation, diaphoresis, chest pain, testicular pain, penile discharge, groin pain, dysuria, hematuria, melena, or back/neck pain  All other systems reviewed and negative  Patient denies contacts with similar symptoms  Patient denies any recent use of antibiotics, international travel, or trauma  Patient notes history of prior lithotripsy and drainage of the perinephric abscess  Objective:   Vital signs reviewed  Constitutional: mild acute distress  Eyes: No scleral icterus  HENT: Head normocephalic  Pharynx moist    CV: Regular rate and rhythm  Respiratory: Lungs clear to auscultation bilaterally without adventitious sounds  Abdomen: Inspection of an obese abdomen without previous abdominal surgical incisions noted without erythema, rashes or ecchymosis noted  No abdominal pulsations noted  Normal bowel sounds with no bruit auscultated  Soft abdomen  Palpitation noted lateral upper left abdominal wall and left lower quadrant; tenderness not over McBurney's point  No masses or pulsatile aorta noted on examination  No rebound or guarding noted on examination, non-peritoneal exam  Negative psoas, obturator, and Rovsing's signs  Negative Hilliard's sign  Back: Normal inspection with no rash or signs of herpes zoster  Costovertebral tenderness not present - pain is lateral and inferior to this  Skin: No ecchymosis of the umbilicus (negative Scott's sign) or flank (negative Grey Wood's sign)  Warm and dry  Extremities: Non-tender lower extremities without asymmetry  Neuro: Alert  Answers questions appropriately  Psych: Normal mood and affect  Medical Decision Making   Patient presents with flank pain with a broad differential including intra-abdominal pathologies such as nephrolithiasis and pyelonephritis  As patient notes associated urinary symptoms, most likely associated with renal pathology  Considering patient's history of diabetes and prior perinephric abscess with associated sepsis, will obtain septic evaluation although patient is afebrile at present  Patient's pain is slightly superior and states he is having difficulty with breathing so will obtain D-dimer for potential alternative as patient is low risk via wells  One for cardiac evaluation considering patient's history of poorly controlled diabetes though this is also less likely  More likely related to patient's renal pathology, will monitor and re-evaluate after symptomatic management  Prior to Admission Medications   Prescriptions Last Dose Informant Patient Reported? Taking?    albuterol (2 5 mg/3 mL) 0 083 % nebulizer solution   No No   Sig: Take 3 mL (2 5 mg total) by nebulization every 6 (six) hours as needed for wheezing   albuterol (PROVENTIL HFA,VENTOLIN HFA) 90 mcg/act inhaler   Yes No   Sig: Inhale 2 puffs every 6 (six) hours as needed for wheezing   montelukast (SINGULAIR) 10 mg tablet   Yes No   Sig: Take 10 mg by mouth daily at bedtime      Facility-Administered Medications: None       Past Medical History:   Diagnosis Date    Asthma     Diabetes mellitus ([facility] [address] )     Hypertension     Nephrolithiasis        Past Surgical History:   Procedure Laterality Date    DENTAL SURGERY      IA CYSTO/URETERO W/LITHOTRIPSY &INDWELL STENT INSRT Left 7/26/2017    Procedure: CYSTOSCOPY URETEROSCOPY WITH LITHOTRIPSY HOLMIUM LASER, RETROGRADE PYELOGRAM AND INSERTION STENT URETERAL;  Surgeon: Keri Wong MD;  Location: [facility] OR;  Service: Urology       Family History   Problem Relation Age of Onset    Diabetes Father     Hypertension Father     Diabetes Maternal Grandfather      I have reviewed and agree with the history as documented  Social History   Substance Use Topics    Smoking status: Never Smoker    Smokeless tobacco: Never Used    Alcohol use No        Review of Systems   All other systems reviewed and are negative        Physical Exam  Physical Exam    Vital Signs  ED Triage Vitals   Temperature Pulse Respirations Blood Pressure SpO2   09/05/18 2034 09/05/18 2034 09/05/18 2034 09/05/18 2034 09/05/18 2034   98 3 °F (36 8 °C) 74 20 159/92 96 %      Temp Source Heart Rate Source Patient Position - Orthostatic VS BP Location FiO2 (%)   09/05/18 2034 09/05/18 2034 09/05/18 2034 09/05/18 2034 --   Oral Monitor Sitting Right arm       Pain Score       09/06/18 0310       4           Vitals:    09/05/18 2034 09/06/18 0311   BP: 159/92 148/88   Pulse: 74 78   Patient Position - Orthostatic VS: Sitting Lying       Visual Acuity      ED Medications  Medications   ondansetron (ZOFRAN) injection 4 mg (4 mg Intravenous Given 9/6/18 0002)   ketorolac (TORADOL) injection 15 mg (15 mg Intravenous Given 9/6/18 0002)   sodium chloride 0 9 % bolus 1,000 mL (0 mL Intravenous Stopped 9/6/18 0310)   iohexol (OMNIPAQUE) 350 MG/ML injection (MULTI-DOSE) 100 mL (100 mL Intravenous Given 9/6/18 0130)       Diagnostic Studies  Results Reviewed     Procedure Component Value Units Date/Time    Lactic acid, plasma [[id number]]  (Normal) Collected:  09/06/18 0003    Lab Status:  Final result Specimen:  Blood from Arm, Left Updated:  09/06/18 0038     LACTIC ACID 0 5 mmol/L     Narrative:         Result may be elevated if tourniquet was used during collection  Troponin I [[id number]]  (Normal) Collected:  09/06/18 0003    Lab Status:  Final result Specimen:  Blood from Arm, Left Updated:  09/06/18 0038     Troponin I <0 02 ng/mL     CMP [[id number]] Collected:  09/06/18 0003    Lab Status:  Final result Specimen:  Blood from Arm, Left Updated:  09/06/18 0034     Sodium 140 mmol/L      Potassium 4 3 mmol/L      Chloride 103 mmol/L      CO2 30 mmol/L      ANION GAP 7 mmol/L      BUN 14 mg/dL      Creatinine 0 88 mg/dL      Glucose 91 mg/dL      Calcium 9 1 mg/dL      AST 36 U/L      ALT 41 U/L      Alkaline Phosphatase 59 U/L      Total Protein 7 9 g/dL      Albumin 3 6 g/dL      Total Bilirubin 0 40 mg/dL      eGFR 114 ml/min/1 73sq m     Narrative:         National Kidney Disease Education Program recommendations are as follows:  GFR calculation is accurate only with a steady state creatinine  Chronic Kidney disease less than 60 ml/min/1 73 sq  meters  Kidney failure less than 15 ml/min/1 73 sq  meters      Lipase [[id number]]  (Normal) Collected:  09/06/18 0003    Lab Status:  Final result Specimen:  Blood from Arm, Left Updated:  09/06/18 0034     Lipase 186 u/L     D-dimer, quantitative [[id number]]  (Normal) Collected:  09/06/18 0003    Lab Status:  Final result Specimen:  Blood from Arm, Left Updated:  09/06/18 0023     D-Dimer, Quant 325 ng/ml (FEU)     UA w Reflex to Microscopic w Reflex to Culture [[id number]] Collected:  09/06/18 0008    Lab Status:  Final result Specimen:  Urine from Urine, Clean Catch Updated:  09/06/18 0015     Color, UA Yellow     Clarity, UA Clear     Specific Gravity, UA 1 025     pH, UA 6 0     Leukocytes, UA Negative     Nitrite, UA Negative     Protein, UA Negative mg/dl      Glucose, UA Negative mg/dl      Ketones, UA Negative mg/dl      Urobilinogen, UA 0 2 E U /dl      Bilirubin, UA Negative     Blood, UA Negative    CBC and differential [[id number]]  (Abnormal) Collected:  09/06/18 0003    Lab Status:  Final result Specimen:  Blood from Arm, Left Updated:  09/06/18 0010     WBC 8 67 Thousand/uL      RBC 5 09 Million/uL      Hemoglobin 14 1 g/dL      Hematocrit 42 4 %      MCV 83 fL      MCH 27 7 pg      MCHC 33 3 g/dL      RDW 12 4 %      MPV 10 0 fL      Platelets 542 Thousands/uL      nRBC 0 /100 WBCs      Neutrophils Relative 50 %      Immat GRANS % 0 %      Lymphocytes Relative 31 %      Monocytes Relative 11 %      Eosinophils Relative 7 (H) %      Basophils Relative 1 %      Neutrophils Absolute 4 38 Thousands/µL      Immature Grans Absolute 0 02 Thousand/uL      Lymphocytes Absolute 2 68 Thousands/µL      Monocytes Absolute 0 95 Thousand/µL      Eosinophils Absolute 0 59 Thousand/µL      Basophils Absolute 0 05 Thousands/µL                  CT abdomen pelvis with contrast   Final Result by Bud Florian MD (09/06 6247)      1  Suggestion of hepatic steatosis  2   Bilateral nonobstructing nephrolithiasis  No hydronephrosis  Workstation performed: [id number]                    Procedures  Procedures       Phone Contacts  ED Phone Contact    ED Course  ED Course as of Sep 06 0601   Thu Sep 06, 2018   0039 EKG demonstrates sinus bradycardia at a rate of 59 with no acute ST segment changes  0302 Patient's pain improved with treatment  Discussed potential etiologies considering nondiagnostic evaluation  Discussed continued follow-up with primary care physician  Discussed return precautions in detail  Discussed symptomatic management including risks and benefits of medication                                  MDM  CritCare Time    Disposition  Final diagnoses:   Kidney stones   Abdominal pain   Nausea   Diarrhea   Left flank pain     Time reflects when diagnosis was documented in both MDM as applicable and the Disposition within this note     Time User Action Codes Description Comment    9/6/2018  2:53 AM Laurie Oleary [N20 0] Kidney stones     9/6/2018  2:53 AM Jerrol Brigida Add [R10 9] Abdominal pain     9/6/2018  2:53 AM Jerrol Brigida Modify [N20 0] Kidney stones     9/6/2018  2:53 AM Jerrol Brigida Modify [R10 9] Abdominal pain     9/6/2018  2:54 AM Jerrol Brigida Add [R11 0] Nausea     9/6/2018  2:54 AM Jerrol Brigida Add [R19 7] Diarrhea     9/6/2018  2:55 AM Jerrol Brigida Add [R10 9] Left flank pain     9/6/2018  2:55 AM Jerrol Brigida Modify [R10 9] Abdominal pain     9/6/2018  2:55 AM Jerrol Briigda Modify [R10 9] Left flank pain       ED Disposition     ED Disposition Condition Comment    Discharge  Vasiliy Veloz discharge to home/self care  Condition at discharge: Stable        Follow-up Information     Follow up With Specialties Details Why [address] Bruce Ramsey MD Family Medicine Schedule an appointment as soon as possible for a visit in 3 days Follow-up and reassessment   [address] [facility] Emergency Department Emergency Medicine Go to If symptoms worsen [address] [place] [id number]  [phone] [place] ED, [address] [place], [place], [facility]          Discharge Medication List as of 9/6/2018  3:03 AM      START taking these medications    Details   dicyclomine (BENTYL) 20 mg tablet Take 1 tablet (20 mg total) by mouth 2 (two) times a day as needed (abdominal pain), Starting Thu 9/6/2018, Print      ondansetron (ZOFRAN-ODT) 4 mg disintegrating tablet Take 1 tablet (4 mg total) by mouth every 8 (eight) hours as needed for nausea or vomiting, Starting Thu 9/6/2018, Print         CONTINUE these medications which have NOT CHANGED    Details   albuterol (2 5 mg/3 mL) 0 083 % nebulizer solution Take 3 mL (2 5 mg total) by nebulization every 6 (six) hours as needed for wheezing, Starting Sun 3/25/2018, Print      albuterol (PROVENTIL HFA,VENTOLIN HFA) 90 mcg/act inhaler Inhale 2 puffs every 6 (six) hours as needed for wheezing, Historical Med      montelukast (SINGULAIR) 10 mg tablet Take 10 mg by mouth daily at bedtime, Historical Med           No discharge procedures on file      ED Provider  Electronically Signed by           Laura Spencer MD  09/06/18 7467

## 2018-09-06 NOTE — DISCHARGE INSTRUCTIONS
Flank Pain   WHAT YOU NEED TO KNOW:   Flank pain is felt in the area below your ribcage and above your hip bones, often in the lower back  Your pain may be dull or so severe that you cannot get comfortable  The pain may stay in one area or radiate to another area  It may worsen and lighten in waves  Flank pain is often a sign of problems with your urinary tract, such as a kidney stone or infection  DISCHARGE INSTRUCTIONS:   Return to the emergency department if:   · You have a fever  · Your heart is fluttering or jumping  · You see blood in your urine  · Your pain radiates into your lower abdomen and genital area  · You have intense pain in your low back next to your spine  · You are much more tired than usual and have no desire to eat  · You have a headache and your muscles jerk  Contact your healthcare provider if:   · You have an upset stomach and are vomiting  · You have to urinate more often, and with urgency  · Your pain worsens or does not improve, and you cannot get comfortable  · You pass a stone when you urinate  · You have questions or concerns about your condition or care  Medicines: The following medicines may be ordered for you:  · Pain medicine  may help decrease or relieve your pain  Do not wait until the pain is severe before you take your medicine  · Antibiotics  may help treat a urinary tract infection caused by bacteria  · Take your medicine as directed  Contact your healthcare provider if you think your medicine is not helping or if you have side effects  Tell him of her if you are allergic to any medicine  Keep a list of the medicines, vitamins, and herbs you take  Include the amounts, and when and why you take them  Bring the list or the pill bottles to follow-up visits  Carry your medicine list with you in case of an emergency    Follow up with your healthcare provider in 1 to 2 days or as directed:  Write down your questions so you remember to ask them during your visits  © 2017 [address] Information is for End User's use only and may not be sold, redistributed or otherwise used for commercial purposes  All illustrations and images included in CareNotes® are the copyrighted property of [employer]  or [employer]  The above information is an  only  It is not intended as medical advice for individual conditions or treatments  Talk to your doctor, nurse or pharmacist before following any medical regimen to see if it is safe and effective for you  Acute Nausea and Vomiting   WHAT YOU NEED TO KNOW:   Acute nausea and vomiting start suddenly, worsen quickly, and last a short time  DISCHARGE INSTRUCTIONS:   Return to the emergency department if:   · You see blood in your vomit or your bowel movements  · You have sudden, severe pain in your chest and upper abdomen after hard vomiting or retching  · You have swelling in your neck and chest      · You are dizzy, cold, and thirsty and your eyes and mouth are dry  · You are urinating very little or not at all  · You have muscle weakness, leg cramps, and trouble breathing  · Your heart is beating much faster than normal      · You continue to vomit for more than 48 hours  Contact your healthcare provider if:   · You have frequent dry heaves (vomiting but nothing comes out)  · Your nausea and vomiting does not get better or go away after you use medicine  · You have questions or concerns about your condition or treatment  Medicines: You may need any of the following:  · Medicines  may be given to calm your stomach and stop your vomiting  You may also need medicines to help you feel more relaxed or to stop nausea and vomiting caused by motion sickness  · Gastrointestinal stimulants  are used to help empty your stomach and bowels  This may help decrease nausea and vomiting  · Take your medicine as directed    Contact your healthcare provider if you think your medicine is not helping or if you have side effects  Tell him or her if you are allergic to any medicine  Keep a list of the medicines, vitamins, and herbs you take  Include the amounts, and when and why you take them  Bring the list or the pill bottles to follow-up visits  Carry your medicine list with you in case of an emergency  Prevent or manage acute nausea and vomiting:   · Do not drink alcohol  Alcohol may upset or irritate your stomach  Too much alcohol can also cause acute nausea and vomiting  · Control stress  Headaches due to stress may cause nausea and vomiting  Find ways to relax and manage your stress  Get more rest and sleep  · Drink more liquids as directed  Vomiting can lead to dehydration  It is important to drink more liquids to help replace lost body fluids  Ask your healthcare provider how much liquid to drink each day and which liquids are best for you  Your provider may recommend that you drink an oral rehydration solution (ORS)  ORS contains water, salts, and sugar that are needed to replace the lost body fluids  Ask what kind of ORS to use, how much to drink, and where to get it  · Eat smaller meals, more often  Eat small amounts of food every 2 to 3 hours, even if you are not hungry  Food in your stomach may decrease your nausea  · Talk to your healthcare provider before you take over-the-counter (OTC) medicines  These medicines can cause serious problems if you use certain other medicines, or you have a medical condition  You may have problems if you use too much or use them for longer than the label says  Follow directions on the label carefully  Follow up with your healthcare provider as directed:  Write down your questions so you remember to ask them during your follow-up visits  © 2017 [address] Information is for End User's use only and may not be sold, redistributed or otherwise used for commercial purposes   All illustrations and images included in CareNotes® are the copyrighted property of [employer]  or [employer] Gamb[employer]  The above information is an  only  It is not intended as medical advice for individual conditions or treatments  Talk to your doctor, nurse or pharmacist before following any medical regimen to see if it is safe and effective for you

## 2018-09-07 LAB
ATRIAL RATE: 59 BPM
P AXIS: 60 DEGREES
PR INTERVAL: 170 MS
QRS AXIS: 27 DEGREES
QRSD INTERVAL: 84 MS
QT INTERVAL: 400 MS
QTC INTERVAL: 396 MS
T WAVE AXIS: 63 DEGREES
VENTRICULAR RATE: 59 BPM

## 2018-09-07 PROCEDURE — 93010 ELECTROCARDIOGRAM REPORT: CPT | Performed by: INTERNAL MEDICINE

## 2019-03-13 RX ORDER — OMEPRAZOLE 20 MG/1
20 CAPSULE, DELAYED RELEASE ORAL
COMMUNITY
Start: 2018-05-16

## 2019-03-13 RX ORDER — LORATADINE 10 MG/1
TABLET ORAL
COMMUNITY
Start: 2018-04-03

## 2019-03-13 RX ORDER — CIPROFLOXACIN 500 MG/1
TABLET, FILM COATED ORAL
COMMUNITY

## 2019-03-13 RX ORDER — LANCETS
EACH MISCELLANEOUS
COMMUNITY
Start: 2018-03-29

## 2019-03-13 RX ORDER — GABAPENTIN 300 MG/1
300 CAPSULE ORAL
COMMUNITY
Start: 2018-04-25

## 2019-03-14 ENCOUNTER — OFFICE VISIT (OUTPATIENT)
Dept: GASTROENTEROLOGY | Facility: CLINIC | Age: 34
End: 2019-03-14
Payer: COMMERCIAL

## 2019-03-14 ENCOUNTER — DOCUMENTATION (OUTPATIENT)
Dept: GASTROENTEROLOGY | Facility: CLINIC | Age: 34
End: 2019-03-14

## 2019-03-14 VITALS
HEART RATE: 81 BPM | WEIGHT: 284.8 LBS | BODY MASS INDEX: 34.67 KG/M2 | SYSTOLIC BLOOD PRESSURE: 140 MMHG | DIASTOLIC BLOOD PRESSURE: 84 MMHG

## 2019-03-14 DIAGNOSIS — R10.32 LEFT LOWER QUADRANT PAIN: ICD-10-CM

## 2019-03-14 DIAGNOSIS — K59.04 CHRONIC IDIOPATHIC CONSTIPATION: Primary | ICD-10-CM

## 2019-03-14 PROCEDURE — 99244 OFF/OP CNSLTJ NEW/EST MOD 40: CPT | Performed by: PHYSICIAN ASSISTANT

## 2019-03-14 RX ORDER — DICYCLOMINE HCL 20 MG
20 TABLET ORAL EVERY 6 HOURS
Qty: 90 TABLET | Refills: 3 | Status: SHIPPED | OUTPATIENT
Start: 2019-03-14 | End: 2020-03-03

## 2019-03-14 NOTE — PROGRESS NOTES
[facility] - Outpatient Consultation  Audrey Beckford 30 y o  male MRN: [id number]  Encounter: [id number]          ASSESSMENT AND PLAN:      1  Chronic idiopathic constipation  Start fiber supplement and probiotic  Start Amitiza BID  Dicyclomine prn  Complete workup with colonoscopy  Suspect functional symptoms     Extensive time spent educating patient on potential causes of symptoms and lifestyle/dietary modifications which may help    2  Left lower quadrant pain      ______________________________________________________________________    HPI:  30year old male presents with complaints of abdominal pain and constipation  Symptoms have been ongoing for approximately 2 years  Seemed to have started after he was hospitalized with urosepsis due to kidney stones  He is currently relying on laxatives every few days to achieve a BM  He reports that occasionally the pain is so severe that he cannot go to work  He works overnights at [employer] and so admits his eating habits fluctuate  He has been diagnosed with diabetes but states his blood sugars are generally well controlled on Metformin  He denies rectal bleeding, nausea/vomiting, diarrhea  He does admit to gaseousness and bloating which improve with a BM  He denies any recent unexpected weight loss  He had extensive lab testing in the fall of 2018 which showed a slightly elevated ESR  CT from 9/2018 showed a fatty liver but no other abdominal abnormalities  REVIEW OF SYSTEMS:    CONSTITUTIONAL: Denies any fever, chills, rigors, and weight loss  HEENT: No earache or tinnitus  Denies hearing loss or visual disturbances  CARDIOVASCULAR: No chest pain or palpitations  RESPIRATORY: Denies any cough, hemoptysis, shortness of breath or dyspnea on exertion  GASTROINTESTINAL: As noted in the History of Present Illness  GENITOURINARY: No problems with urination  Denies any hematuria or dysuria    NEUROLOGIC: No dizziness or vertigo, denies headaches  MUSCULOSKELETAL: Denies any muscle or joint pain  SKIN: Denies skin rashes or itching  ENDOCRINE: Denies excessive thirst  Denies intolerance to heat or cold  PSYCHOSOCIAL: Denies depression or anxiety  Denies any recent memory loss         Historical Information   Past Medical History:   Diagnosis Date    Asthma     Diabetes mellitus ([address] )     Hypertension     Nephrolithiasis     Nerve damage     feet, legs, hands     Past Surgical History:   Procedure Laterality Date    DENTAL SURGERY      [place] CYSTO/URETERO W/LITHOTRIPSY &INDWELL STENT INSRT Left 7/26/2017    Procedure: CYSTOSCOPY URETEROSCOPY WITH LITHOTRIPSY HOLMIUM LASER, RETROGRADE PYELOGRAM AND INSERTION STENT URETERAL;  Surgeon: Román Butts MD;  Location: [facility];  Service: Urology     Social History   Social History     Substance and Sexual Activity   Alcohol Use No     Social History     Substance and Sexual Activity   Drug Use No     Social History     Tobacco Use   Smoking Status Never Smoker   Smokeless Tobacco Never Used     Family History   Problem Relation Age of Onset    Diabetes Father     Hypertension Father     Diabetes Maternal Grandfather     Thyroid disease Mother        Meds/Allergies       Current Outpatient Medications:     albuterol (2 5 mg/3 mL) 0 083 % nebulizer solution    albuterol (PROVENTIL HFA,VENTOLIN HFA) 90 mcg/act inhaler    Blood Glucose Monitoring Suppl (ACURA BLOOD GLUCOSE METER) w/Device KIT    ciprofloxacin (CIPRO) 500 mg tablet    dicyclomine (BENTYL) 20 mg tablet    gabapentin (NEURONTIN) 300 mg capsule    glucose blood test strip    Lancets (ONETOUCH ULTRASOFT) lancets    metFORMIN (GLUCOPHAGE) 500 mg tablet    montelukast (SINGULAIR) 10 mg tablet    omeprazole (PRILOSEC) 20 mg delayed release capsule    ALBUTEROL IN    dicyclomine (BENTYL) 20 mg tablet    hydrocortisone 2 5 % cream    loratadine (CLARITIN) 10 mg tablet    lubiprostone (AMITIZA) 24 mcg capsule   ondansetron (ZOFRAN-ODT) 4 mg disintegrating tablet    ONETOUCH DELICA LANCETS FINE MISC    Allergies   Allergen Reactions    Amoxicillin Hives    Penicillins Hives           Objective     Blood pressure 140/84, pulse 81, weight 129 kg (284 lb 12 8 oz)  Body mass index is 34 67 kg/m²  PHYSICAL EXAM:      General Appearance:   Alert, cooperative, no distress   HEENT:   Normocephalic, atraumatic, anicteric      Neck:  Supple, symmetrical, trachea midline   Lungs:   Clear to auscultation bilaterally; no rales, rhonchi or wheezing; respirations unlabored    Heart[de-identified]   Regular rate and rhythm; no murmur, rub, or gallop  Abdomen:   Soft, tender to palpation lower abdomen, non-distended; normal bowel sounds; no masses, no organomegaly    Genitalia:   Deferred    Rectal:   Deferred    Extremities:  No cyanosis, clubbing or edema    Pulses:  2+ and symmetric    Skin:  No jaundice, rashes, or lesions    Lymph nodes:  No palpable cervical lymphadenopathy        Lab Results:   No visits with results within 1 Day(s) from this visit     Latest known visit with results is:   Admission on 09/05/2018, Discharged on 09/06/2018   Component Date Value    WBC 09/06/2018 8 67     RBC 09/06/2018 5 09     Hemoglobin 09/06/2018 14 1     Hematocrit 09/06/2018 42 4     MCV 09/06/2018 83     MCH 09/06/2018 27 7     MCHC 09/06/2018 33 3     RDW 09/06/2018 12 4     MPV 09/06/2018 10 0     Platelets 56/28/8385 365     nRBC 09/06/2018 0     Neutrophils Relative 09/06/2018 50     Immat GRANS % 09/06/2018 0     Lymphocytes Relative 09/06/2018 31     Monocytes Relative 09/06/2018 11     Eosinophils Relative 09/06/2018 7*    Basophils Relative 09/06/2018 1     Neutrophils Absolute 09/06/2018 4 38     Immature Grans Absolute 09/06/2018 0 02     Lymphocytes Absolute 09/06/2018 2 68     Monocytes Absolute 09/06/2018 0 95     Eosinophils Absolute 09/06/2018 0 59     Basophils Absolute 09/06/2018 0 05     Sodium 09/06/2018 140     Potassium 09/06/2018 4 3     Chloride 09/06/2018 103     CO2 09/06/2018 30     ANION GAP 09/06/2018 7     BUN 09/06/2018 14     Creatinine 09/06/2018 0 88     Glucose 09/06/2018 91     Calcium 09/06/2018 9 1     AST 09/06/2018 36     ALT 09/06/2018 41     Alkaline Phosphatase 09/06/2018 59     Total Protein 09/06/2018 7 9     Albumin 09/06/2018 3 6     Total Bilirubin 09/06/2018 0 40     eGFR 09/06/2018 114     Lipase 09/06/2018 186     Color, UA 09/06/2018 Yellow     Clarity, UA 09/06/2018 Clear     Specific Gravity, UA 09/06/2018 1 025     pH, UA 09/06/2018 6 0     Leukocytes, UA 09/06/2018 Negative     Nitrite, UA 09/06/2018 Negative     Protein, UA 09/06/2018 Negative     Glucose, UA 09/06/2018 Negative     Ketones, UA 09/06/2018 Negative     Urobilinogen, UA 09/06/2018 0 2     Bilirubin, UA 09/06/2018 Negative     Blood, UA 09/06/2018 Negative     LACTIC ACID 09/06/2018 0 5     Troponin I 09/06/2018 <0 02     D-Dimer, Quant 09/06/2018 325     Ventricular Rate 09/06/2018 59     Atrial Rate 09/06/2018 59     HI Interval 09/06/2018 170     QRSD Interval 09/06/2018 84     QT Interval 09/06/2018 400     QTC Interval 09/06/2018 396     P Axis 09/06/2018 60     QRS Axis 09/06/2018 27     T Wave Axis 09/06/2018 63          Radiology Results:   No results found

## 2019-03-14 NOTE — LETTER
March 14, 2019     [facility]  [address] [address]    Patient: Jacklyn Kahn   YOB: 1985   Date of Visit: 3/14/2019       Dear Dr Oliva Leyden:    Thank you for referring Maranda Moore to me for evaluation  Below are my notes for this consultation  If you have questions, please do not hesitate to call me  I look forward to following your patient along with you  Sincerely,        Sofia Hilton PA-C        CC: No Recipients  Suraj Bronson  3/14/2019 11:35 AM  Sign at close encounter  [facility] - Outpatient Consultation  Jacklyn Kahn 30 y o  male MRN: [id number]  Encounter: [id number]          ASSESSMENT AND PLAN:      1  Chronic idiopathic constipation  Start fiber supplement and probiotic  Start Amitiza BID  Dicyclomine prn  Complete workup with colonoscopy  Suspect functional symptoms     2  Left lower quadrant pain      ______________________________________________________________________    HPI:  30year old male presents with complaints of abdominal pain and constipation  Symptoms have been ongoing for approximately 2 years  Seemed to have started after he was hospitalized with urosepsis due to kidney stones  He is currently relying on laxatives every few days to achieve a BM  He reports that occasionally the pain is so severe that he cannot go to work  He works overnights at [employer] and so admits his eating habits fluctuate  He has been diagnosed with diabetes but states his blood sugars are generally well controlled on Metformin  He denies rectal bleeding, nausea/vomiting, diarrhea  He does admit to gaseousness and bloating which improve with a BM  He denies any recent unexpected weight loss  He had extensive lab testing in the fall of 2018 which showed a slightly elevated ESR  CT from 9/2018 showed a fatty liver but no other abdominal abnormalities         REVIEW OF SYSTEMS:    CONSTITUTIONAL: Denies any fever, chills, rigors, and weight loss  HEENT: No earache or tinnitus  Denies hearing loss or visual disturbances  CARDIOVASCULAR: No chest pain or palpitations  RESPIRATORY: Denies any cough, hemoptysis, shortness of breath or dyspnea on exertion  GASTROINTESTINAL: As noted in the History of Present Illness  GENITOURINARY: No problems with urination  Denies any hematuria or dysuria  NEUROLOGIC: No dizziness or vertigo, denies headaches  MUSCULOSKELETAL: Denies any muscle or joint pain  SKIN: Denies skin rashes or itching  ENDOCRINE: Denies excessive thirst  Denies intolerance to heat or cold  PSYCHOSOCIAL: Denies depression or anxiety  Denies any recent memory loss         Historical Information   Past Medical History:   Diagnosis Date    Asthma     Diabetes mellitus [address] [address] )     Hypertension     Nephrolithiasis     Nerve damage     feet, legs, hands     Past Surgical History:   Procedure Laterality Date    DENTAL SURGERY      [place] CYSTO/URETERO W/LITHOTRIPSY &INDWELL STENT INSRT Left 7/26/2017    Procedure: CYSTOSCOPY URETEROSCOPY WITH LITHOTRIPSY HOLMIUM LASER, RETROGRADE PYELOGRAM AND INSERTION STENT URETERAL;  Surgeon: Andre Vivar MD;  Location: [facility];  Service: Urology     Social History   Social History     Substance and Sexual Activity   Alcohol Use No     Social History     Substance and Sexual Activity   Drug Use No     Social History     Tobacco Use   Smoking Status Never Smoker   Smokeless Tobacco Never Used     Family History   Problem Relation Age of Onset    Diabetes Father     Hypertension Father     Diabetes Maternal Grandfather     Thyroid disease Mother        Meds/Allergies       Current Outpatient Medications:     albuterol (2 5 mg/3 mL) 0 083 % nebulizer solution    albuterol (PROVENTIL HFA,VENTOLIN HFA) 90 mcg/act inhaler    Blood Glucose Monitoring Suppl (ACURA BLOOD GLUCOSE METER) w/Device KIT    ciprofloxacin (CIPRO) 500 mg tablet    dicyclomine (BENTYL) 20 mg tablet    gabapentin (NEURONTIN) 300 mg capsule    glucose blood test strip    Lancets (ONETOUCH ULTRASOFT) lancets    metFORMIN (GLUCOPHAGE) 500 mg tablet    montelukast (SINGULAIR) 10 mg tablet    omeprazole (PRILOSEC) 20 mg delayed release capsule    ALBUTEROL IN    dicyclomine (BENTYL) 20 mg tablet    hydrocortisone 2 5 % cream    loratadine (CLARITIN) 10 mg tablet    lubiprostone (AMITIZA) 24 mcg capsule    ondansetron (ZOFRAN-ODT) 4 mg disintegrating tablet    ONETOUCH DELICA LANCETS FINE MISC    Allergies   Allergen Reactions    Amoxicillin Hives    Penicillins Hives           Objective     Blood pressure 140/84, pulse 81, weight 129 kg (284 lb 12 8 oz)  Body mass index is 34 67 kg/m²  PHYSICAL EXAM:      General Appearance:   Alert, cooperative, no distress   HEENT:   Normocephalic, atraumatic, anicteric      Neck:  Supple, symmetrical, trachea midline   Lungs:   Clear to auscultation bilaterally; no rales, rhonchi or wheezing; respirations unlabored    Heart[de-identified]   Regular rate and rhythm; no murmur, rub, or gallop  Abdomen:   Soft, tender to palpation lower abdomen, non-distended; normal bowel sounds; no masses, no organomegaly    Genitalia:   Deferred    Rectal:   Deferred    Extremities:  No cyanosis, clubbing or edema    Pulses:  2+ and symmetric    Skin:  No jaundice, rashes, or lesions    Lymph nodes:  No palpable cervical lymphadenopathy        Lab Results:   No visits with results within 1 Day(s) from this visit     Latest known visit with results is:   Admission on 09/05/2018, Discharged on 09/06/2018   Component Date Value    WBC 09/06/2018 8 67     RBC 09/06/2018 5 09     Hemoglobin 09/06/2018 14 1     Hematocrit 09/06/2018 42 4     MCV 09/06/2018 83     MCH 09/06/2018 27 7     MCHC 09/06/2018 33 3     RDW 09/06/2018 12 4     MPV 09/06/2018 10 0     Platelets 51/49/1345 365     nRBC 09/06/2018 0     Neutrophils Relative 09/06/2018 50     Immat GRANS % 09/06/2018 0  Lymphocytes Relative 09/06/2018 31     Monocytes Relative 09/06/2018 11     Eosinophils Relative 09/06/2018 7*    Basophils Relative 09/06/2018 1     Neutrophils Absolute 09/06/2018 4 38     Immature Grans Absolute 09/06/2018 0 02     Lymphocytes Absolute 09/06/2018 2 68     Monocytes Absolute 09/06/2018 0 95     Eosinophils Absolute 09/06/2018 0 59     Basophils Absolute 09/06/2018 0 05     Sodium 09/06/2018 140     Potassium 09/06/2018 4 3     Chloride 09/06/2018 103     CO2 09/06/2018 30     ANION GAP 09/06/2018 7     BUN 09/06/2018 14     Creatinine 09/06/2018 0 88     Glucose 09/06/2018 91     Calcium 09/06/2018 9 1     AST 09/06/2018 36     ALT 09/06/2018 41     Alkaline Phosphatase 09/06/2018 59     Total Protein 09/06/2018 7 9     Albumin 09/06/2018 3 6     Total Bilirubin 09/06/2018 0 40     eGFR 09/06/2018 114     Lipase 09/06/2018 186     Color, UA 09/06/2018 Yellow     Clarity, UA 09/06/2018 Clear     Specific Gravity, UA 09/06/2018 1 025     pH, UA 09/06/2018 6 0     Leukocytes, UA 09/06/2018 Negative     Nitrite, UA 09/06/2018 Negative     Protein, UA 09/06/2018 Negative     Glucose, UA 09/06/2018 Negative     Ketones, UA 09/06/2018 Negative     Urobilinogen, UA 09/06/2018 0 2     Bilirubin, UA 09/06/2018 Negative     Blood, UA 09/06/2018 Negative     LACTIC ACID 09/06/2018 0 5     Troponin I 09/06/2018 <0 02     D-Dimer, Quant 09/06/2018 325     Ventricular Rate 09/06/2018 59     Atrial Rate 09/06/2018 59     MA Interval 09/06/2018 170     QRSD Interval 09/06/2018 84     QT Interval 09/06/2018 400     QTC Interval 09/06/2018 396     P Axis 09/06/2018 60     QRS Axis 09/06/2018 27     T Wave Axis 09/06/2018 63          Radiology Results:   No results found

## 2019-03-14 NOTE — LETTER
March 14, 2019     Ella Bess MD  [facility] [address]  [address] [address]    Patient: Gina Sender   YOB: 1985   Date of Visit: 3/14/2019       Dear Dr Emily Suarez:    Thank you for referring Reinier Doty to me for evaluation  Below are my notes for this consultation  If you have questions, please do not hesitate to call me  I look forward to following your patient along with you  Sincerely,        Sebastian Nuñez PA-C        CC: No Recipients  Sridhar Tay  3/14/2019 11:36 AM  Sign at close encounter  [facility] - Outpatient Consultation  Gina Sender 30 y o  male MRN: [id number]  Encounter: [id number]          ASSESSMENT AND PLAN:      1  Chronic idiopathic constipation  Start fiber supplement and probiotic  Start Amitiza BID  Dicyclomine prn  Complete workup with colonoscopy  Suspect functional symptoms     Extensive time spent educating patient on potential causes of symptoms and lifestyle/dietary modifications which may help    2  Left lower quadrant pain      ______________________________________________________________________    HPI:  30year old male presents with complaints of abdominal pain and constipation  Symptoms have been ongoing for approximately 2 years  Seemed to have started after he was hospitalized with urosepsis due to kidney stones  He is currently relying on laxatives every few days to achieve a BM  He reports that occasionally the pain is so severe that he cannot go to work  He works overnights at [email or web address] and so admits his eating habits fluctuate  He has been diagnosed with diabetes but states his blood sugars are generally well controlled on Metformin  He denies rectal bleeding, nausea/vomiting, diarrhea  He does admit to gaseousness and bloating which improve with a BM  He denies any recent unexpected weight loss  He had extensive lab testing in the fall of 2018 which showed a slightly elevated ESR    CT from 9/2018 showed a fatty liver but no other abdominal abnormalities  REVIEW OF SYSTEMS:    CONSTITUTIONAL: Denies any fever, chills, rigors, and weight loss  HEENT: No earache or tinnitus  Denies hearing loss or visual disturbances  CARDIOVASCULAR: No chest pain or palpitations  RESPIRATORY: Denies any cough, hemoptysis, shortness of breath or dyspnea on exertion  GASTROINTESTINAL: As noted in the History of Present Illness  GENITOURINARY: No problems with urination  Denies any hematuria or dysuria  NEUROLOGIC: No dizziness or vertigo, denies headaches  MUSCULOSKELETAL: Denies any muscle or joint pain  SKIN: Denies skin rashes or itching  ENDOCRINE: Denies excessive thirst  Denies intolerance to heat or cold  PSYCHOSOCIAL: Denies depression or anxiety  Denies any recent memory loss         Historical Information   Past Medical History:   Diagnosis Date    Asthma     Diabetes mellitus ([address] )     Hypertension     Nephrolithiasis     Nerve damage     feet, legs, hands     Past Surgical History:   Procedure Laterality Date    DENTAL SURGERY      [place] CYSTO/URETERO W/LITHOTRIPSY &INDWELL STENT INSRT Left 7/26/2017    Procedure: CYSTOSCOPY URETEROSCOPY WITH LITHOTRIPSY HOLMIUM LASER, RETROGRADE PYELOGRAM AND INSERTION STENT URETERAL;  Surgeon: Luis Armando Garcia MD;  Location: [facility];  Service: Urology     Social History   Social History     Substance and Sexual Activity   Alcohol Use No     Social History     Substance and Sexual Activity   Drug Use No     Social History     Tobacco Use   Smoking Status Never Smoker   Smokeless Tobacco Never Used     Family History   Problem Relation Age of Onset    Diabetes Father     Hypertension Father     Diabetes Maternal Grandfather     Thyroid disease Mother        Meds/Allergies       Current Outpatient Medications:     albuterol (2 5 mg/3 mL) 0 083 % nebulizer solution    albuterol (PROVENTIL HFA,VENTOLIN HFA) 90 mcg/act inhaler    Blood Glucose Monitoring Suppl [facility] BLOOD GLUCOSE METER) w/Device KIT    ciprofloxacin (CIPRO) 500 mg tablet    dicyclomine (BENTYL) 20 mg tablet    gabapentin (NEURONTIN) 300 mg capsule    glucose blood test strip    Lancets (ONETOUCH ULTRASOFT) lancets    metFORMIN (GLUCOPHAGE) 500 mg tablet    montelukast (SINGULAIR) 10 mg tablet    omeprazole (PRILOSEC) 20 mg delayed release capsule    ALBUTEROL IN    dicyclomine (BENTYL) 20 mg tablet    hydrocortisone 2 5 % cream    loratadine (CLARITIN) 10 mg tablet    lubiprostone (AMITIZA) 24 mcg capsule    ondansetron (ZOFRAN-ODT) 4 mg disintegrating tablet    ONETOUCH DELICA LANCETS FINE MISC    Allergies   Allergen Reactions    Amoxicillin Hives    Penicillins Hives           Objective     Blood pressure 140/84, pulse 81, weight 129 kg (284 lb 12 8 oz)  Body mass index is 34 67 kg/m²  PHYSICAL EXAM:      General Appearance:   Alert, cooperative, no distress   HEENT:   Normocephalic, atraumatic, anicteric      Neck:  Supple, symmetrical, trachea midline   Lungs:   Clear to auscultation bilaterally; no rales, rhonchi or wheezing; respirations unlabored    Heart[de-identified]   Regular rate and rhythm; no murmur, rub, or gallop  Abdomen:   Soft, tender to palpation lower abdomen, non-distended; normal bowel sounds; no masses, no organomegaly    Genitalia:   Deferred    Rectal:   Deferred    Extremities:  No cyanosis, clubbing or edema    Pulses:  2+ and symmetric    Skin:  No jaundice, rashes, or lesions    Lymph nodes:  No palpable cervical lymphadenopathy        Lab Results:   No visits with results within 1 Day(s) from this visit     Latest known visit with results is:   Admission on 09/05/2018, Discharged on 09/06/2018   Component Date Value    WBC 09/06/2018 8 67     RBC 09/06/2018 5 09     Hemoglobin 09/06/2018 14 1     Hematocrit 09/06/2018 42 4     MCV 09/06/2018 83     MCH 09/06/2018 27 7     MCHC 09/06/2018 33 3     RDW 09/06/2018 12 4     MPV 09/06/2018 10 0     Platelets 22/95/1330 365     nRBC 09/06/2018 0     Neutrophils Relative 09/06/2018 50     Immat GRANS % 09/06/2018 0     Lymphocytes Relative 09/06/2018 31     Monocytes Relative 09/06/2018 11     Eosinophils Relative 09/06/2018 7*    Basophils Relative 09/06/2018 1     Neutrophils Absolute 09/06/2018 4 38     Immature Grans Absolute 09/06/2018 0 02     Lymphocytes Absolute 09/06/2018 2 68     Monocytes Absolute 09/06/2018 0 95     Eosinophils Absolute 09/06/2018 0 59     Basophils Absolute 09/06/2018 0 05     Sodium 09/06/2018 140     Potassium 09/06/2018 4 3     Chloride 09/06/2018 103     CO2 09/06/2018 30     ANION GAP 09/06/2018 7     BUN 09/06/2018 14     Creatinine 09/06/2018 0 88     Glucose 09/06/2018 91     Calcium 09/06/2018 9 1     AST 09/06/2018 36     ALT 09/06/2018 41     Alkaline Phosphatase 09/06/2018 59     Total Protein 09/06/2018 7 9     Albumin 09/06/2018 3 6     Total Bilirubin 09/06/2018 0 40     eGFR 09/06/2018 114     Lipase 09/06/2018 186     Color, UA 09/06/2018 Yellow     Clarity, UA 09/06/2018 Clear     Specific Gravity, UA 09/06/2018 1 025     pH, UA 09/06/2018 6 0     Leukocytes, UA 09/06/2018 Negative     Nitrite, UA 09/06/2018 Negative     Protein, UA 09/06/2018 Negative     Glucose, UA 09/06/2018 Negative     Ketones, UA 09/06/2018 Negative     Urobilinogen, UA 09/06/2018 0 2     Bilirubin, UA 09/06/2018 Negative     Blood, UA 09/06/2018 Negative     LACTIC ACID 09/06/2018 0 5     Troponin I 09/06/2018 <0 02     D-Dimer, Quant 09/06/2018 325     Ventricular Rate 09/06/2018 59     Atrial Rate 09/06/2018 59     HI Interval 09/06/2018 170     QRSD Interval 09/06/2018 84     QT Interval 09/06/2018 400     QTC Interval 09/06/2018 396     P Axis 09/06/2018 60     QRS Axis 09/06/2018 27     T Wave Axis 09/06/2018 63          Radiology Results:   No results found

## 2019-10-18 ENCOUNTER — HOSPITAL ENCOUNTER (EMERGENCY)
Facility: HOSPITAL | Age: 34
Discharge: HOME | End: 2019-10-18
Attending: EMERGENCY MEDICINE | Admitting: EMERGENCY MEDICINE

## 2019-10-18 ENCOUNTER — APPOINTMENT (EMERGENCY)
Dept: RADIOLOGY | Facility: HOSPITAL | Age: 34
End: 2019-10-18

## 2019-10-18 VITALS
SYSTOLIC BLOOD PRESSURE: 133 MMHG | HEART RATE: 94 BPM | WEIGHT: 276 LBS | HEIGHT: 75 IN | RESPIRATION RATE: 16 BRPM | DIASTOLIC BLOOD PRESSURE: 84 MMHG | OXYGEN SATURATION: 98 % | BODY MASS INDEX: 34.32 KG/M2 | TEMPERATURE: 98.7 F

## 2019-10-18 DIAGNOSIS — S93.402A LEFT ANKLE SPRAIN: ICD-10-CM

## 2019-10-18 DIAGNOSIS — S80.219A ABRASION, KNEE: ICD-10-CM

## 2019-10-18 DIAGNOSIS — S92.313A FRACTURE OF 1ST METATARSAL: Primary | ICD-10-CM

## 2019-10-18 PROCEDURE — 73564 X-RAY EXAM KNEE 4 OR MORE: CPT

## 2019-10-18 PROCEDURE — 99284 EMERGENCY DEPT VISIT MOD MDM: CPT | Performed by: EMERGENCY MEDICINE

## 2019-10-18 PROCEDURE — 73630 X-RAY EXAM OF FOOT: CPT

## 2019-10-18 PROCEDURE — 99283 EMERGENCY DEPT VISIT LOW MDM: CPT

## 2019-10-18 PROCEDURE — 73610 X-RAY EXAM OF ANKLE: CPT

## 2019-10-18 RX ORDER — IBUPROFEN 400 MG/1
400 TABLET ORAL EVERY 6 HOURS PRN
Qty: 30 TABLET | Refills: 0 | Status: SHIPPED | OUTPATIENT
Start: 2019-10-18 | End: 2019-10-23

## 2019-10-18 RX ORDER — HYDROCODONE BITARTRATE AND ACETAMINOPHEN 5; 325 MG/1; MG/1
1 TABLET ORAL EVERY 6 HOURS PRN
Qty: 12 TABLET | Refills: 0 | Status: SHIPPED | OUTPATIENT
Start: 2019-10-18 | End: 2019-10-21

## 2019-10-18 NOTE — ED PROVIDER NOTES
History  Chief Complaint   Patient presents with    Leg Pain     fell two days ago  c/o left lower leg pain  no BT, no LOC     53-year-old male with past medical history significant for asthma, hypertension, diabetes presents to the emergency department with chief complaint of left lower extremity injury following a fall  Onset of symptoms reported as 2 days ago  Location of symptoms reported as the left knee, lower leg, ankle and foot  Quality is reported as sharp pain with swelling  Severity is reported as moderate  Associated symptoms:  Denies lower extremity paralysis or weakness  Positive for joint pain  Positive for abrasion  Denies head injury or loss of consciousness  Denies headache  Denies chest pain or abdominal pain  Modifying factors:  Patient reports walking and weight-bearing exacerbates pain  Context:  Patient reports he fell 2 days ago when he stepped in a puddle and slipped  He reports he injured his left lower leg in the fall  Denies head injury or loss of consciousness  He reports he ha and abrasion on his left lower leg from the fall  He is here today stating that he missed work last night and is still having pain and swelling in the left foot and lower leg  Reviewed past visits via [facility]  Patient was last seen in the emergency department on September 5, 2018 for evaluation of left flank plain and kidney stones  History provided by:  Patient and parent   used: No        Prior to Admission Medications   Prescriptions Last Dose Informant Patient Reported? Taking? ALBUTEROL IN  Self Yes No   Sig: Inhale 2 puffs   Blood Glucose Monitoring Suppl (ACURA BLOOD GLUCOSE METER) w/Device KIT  Self Yes No   Sig: Check BS once daily  One touch, Dx- 11 99   Lancets (ONETOUCH ULTRASOFT) lancets  Self Yes No   Sig: Check BS once daily    One touch, Dx- 27 56   ONETOUCH DELICA LANCETS FINE MISC  Self Yes No   Sig: USE AS DIRECTED DAILY   albuterol (2 5 mg/3 mL) 0 083 % nebulizer solution  Self No No   Sig: Take 3 mL (2 5 mg total) by nebulization every 6 (six) hours as needed for wheezing   albuterol (PROVENTIL HFA,VENTOLIN HFA) 90 mcg/act inhaler  Self Yes No   Sig: Inhale 2 puffs every 6 (six) hours as needed for wheezing   ciprofloxacin (CIPRO) 500 mg tablet  Self Yes No   Sig: Take by mouth   dicyclomine (BENTYL) 20 mg tablet  Self No No   Sig: Take 1 tablet (20 mg total) by mouth 2 (two) times a day as needed (abdominal pain)   dicyclomine (BENTYL) 20 mg tablet   No No   Sig: Take 1 tablet (20 mg total) by mouth every 6 (six) hours   gabapentin (NEURONTIN) 300 mg capsule  Self Yes No   Sig: Take 300 mg by mouth   glucose blood test strip  Self Yes No   Si STRIP BY MISCELLANEOUS ROUTE DAILY  CHECK BS ONCE DAILY  ONE TOUCH, DX- 11 99   hydrocortisone 2 5 % cream  Self Yes No   Sig: Apply topically   loratadine (CLARITIN) 10 mg tablet  Self Yes No   Sig: TAKE 1 TABLET (10 MG TOTAL) BY MOUTH DAILY  lubiprostone (AMITIZA) 24 mcg capsule   No No   Sig: Take 1 capsule (24 mcg total) by mouth 2 (two) times a day with meals   metFORMIN (GLUCOPHAGE) 500 mg tablet  Self Yes No   Sig: TAKE 1 TABLET (500 MG TOTAL) BY MOUTH 2 (TWO) TIMES A DAY WITH MEALS     montelukast (SINGULAIR) 10 mg tablet  Self Yes No   Sig: Take 10 mg by mouth daily at bedtime   omeprazole (PRILOSEC) 20 mg delayed release capsule  Self Yes No   Sig: Take 20 mg by mouth   ondansetron (ZOFRAN-ODT) 4 mg disintegrating tablet  Self No No   Sig: Take 1 tablet (4 mg total) by mouth every 8 (eight) hours as needed for nausea or vomiting   Patient not taking: Reported on 3/14/2019      Facility-Administered Medications: None       Past Medical History:   Diagnosis Date    Asthma     Diabetes mellitus ([facility] [address] )     Hypertension     Nephrolithiasis     Nerve damage     feet, legs, hands       Past Surgical History:   Procedure Laterality Date    DENTAL SURGERY      TN CYSTO/URETERO W/LITHOTRIPSY &INDWELL STENT INSRT Left 7/26/2017    Procedure: CYSTOSCOPY URETEROSCOPY WITH LITHOTRIPSY HOLMIUM LASER, RETROGRADE PYELOGRAM AND INSERTION STENT URETERAL;  Surgeon: Haider Bowles MD;  Location: [facility];  Service: Urology       Family History   Problem Relation Age of Onset    Diabetes Father     Hypertension Father     Diabetes Maternal Grandfather     Thyroid disease Mother      I have reviewed and agree with the history as documented  Social History     Tobacco Use    Smoking status: Never Smoker    Smokeless tobacco: Never Used   Substance Use Topics    Alcohol use: No    Drug use: No        Review of Systems   Constitutional: Negative for activity change, appetite change, chills, diaphoresis, fatigue, fever and unexpected weight change  HENT: Negative for congestion, dental problem, drooling, ear discharge, ear pain, facial swelling, hearing loss, mouth sores, nosebleeds, postnasal drip, rhinorrhea, sinus pressure, sinus pain, sneezing, sore throat, tinnitus, trouble swallowing and voice change  Eyes: Negative for photophobia, pain, discharge, redness, itching and visual disturbance  Respiratory: Negative for cough, chest tightness, shortness of breath and wheezing  Cardiovascular: Negative for chest pain, palpitations and leg swelling  Gastrointestinal: Negative for abdominal distention, abdominal pain, constipation, diarrhea, nausea and vomiting  Endocrine: Negative for cold intolerance, heat intolerance, polydipsia, polyphagia and polyuria  Genitourinary: Negative for difficulty urinating, dysuria, flank pain, frequency, hematuria and urgency  Musculoskeletal: Positive for arthralgias and joint swelling  Negative for back pain, myalgias, neck pain and neck stiffness  Skin: Positive for wound  Negative for color change, pallor and rash  Allergic/Immunologic: Negative for environmental allergies, food allergies and immunocompromised state     Neurological: Negative for dizziness, tremors, seizures, syncope, facial asymmetry, speech difficulty, weakness, light-headedness, numbness and headaches  Hematological: Negative for adenopathy  Does not bruise/bleed easily  Psychiatric/Behavioral: Negative for agitation, confusion and hallucinations  The patient is not nervous/anxious  All other systems reviewed and are negative  Physical Exam  Physical Exam   Constitutional: He is oriented to person, place, and time  He appears well-developed and well-nourished  No distress  /84 (BP Location: Right arm)   Pulse 94   Temp 98 7 °F (37 1 °C) (Oral)   Resp 16   Ht 6' 3" (1 905 m)   Wt 125 kg (276 lb)   SpO2 98%   BMI 34 50 kg/m²    HENT:   Head: Normocephalic and atraumatic  Right Ear: External ear normal    Left Ear: External ear normal    Nose: Nose normal    Mouth/Throat: Oropharynx is clear and moist  No oropharyngeal exudate  Eyes: Pupils are equal, round, and reactive to light  Conjunctivae and EOM are normal  Right eye exhibits no discharge  Left eye exhibits no discharge  No scleral icterus  Neck: Normal range of motion  Neck supple  No tracheal deviation present  No thyromegaly present  Cardiovascular: Normal rate, regular rhythm and intact distal pulses  Pulmonary/Chest: Effort normal and breath sounds normal  No stridor  No respiratory distress  He has no wheezes  He has no rales  He exhibits no tenderness  Abdominal: Soft  Bowel sounds are normal  He exhibits no distension and no mass  There is no tenderness  There is no rebound and no guarding  Musculoskeletal: Normal range of motion  He exhibits tenderness  He exhibits no deformity  Left knee: He exhibits bony tenderness  He exhibits normal range of motion, no swelling, no effusion, no ecchymosis, no deformity, no laceration, no erythema, normal alignment, no LCL laxity, normal meniscus and no MCL laxity  Tenderness found  Lateral joint line tenderness noted   No medial joint line, no MCL, no LCL and no patellar tendon tenderness noted  Left ankle: He exhibits swelling  He exhibits normal range of motion, no ecchymosis, no deformity, no laceration and normal pulse  Tenderness  Lateral malleolus tenderness found  No medial malleolus, no head of 5th metatarsal and no proximal fibula tenderness found  Achilles tendon normal  Achilles tendon exhibits no pain, no defect and normal Mo's test results  Legs:       Left foot: There is tenderness, bony tenderness and swelling  There is normal range of motion, normal capillary refill, no crepitus, no deformity and no laceration  Feet:    Lymphadenopathy:     He has no cervical adenopathy  Neurological: He is alert and oriented to person, place, and time  He displays normal reflexes  No cranial nerve deficit or sensory deficit  He exhibits normal muscle tone  Coordination normal    Skin: Skin is warm and dry  Capillary refill takes less than 2 seconds  No rash noted  He is not diaphoretic  No erythema  No pallor  2 small irregularly shaped superficial abrasions to anterior left knee  No surrounding erythema  No bleeding  No FB present  approximaly 1 5 cm diameter each  Psychiatric: He has a normal mood and affect  His behavior is normal  Judgment and thought content normal    Nursing note and vitals reviewed        Vital Signs  ED Triage Vitals [10/18/19 1035]   Temperature Pulse Respirations Blood Pressure SpO2   98 7 °F (37 1 °C) 94 16 133/84 98 %      Temp Source Heart Rate Source Patient Position - Orthostatic VS BP Location FiO2 (%)   Oral Monitor Sitting Right arm --      Pain Score       --           Vitals:    10/18/19 1035   BP: 133/84   Pulse: 94   Patient Position - Orthostatic VS: Sitting         Visual Acuity      ED Medications  Medications - No data to display    Diagnostic Studies  Results Reviewed     None                 XR knee 4+ views left injury   Final Result by Humberto Mosley MD (10/18 1333)      No acute osseous abnormality  Workstation performed: [id number]         XR ankle 3+ views LEFT   Final Result by Kirk Blackburn MD (10/18 1341)      No acute osseous abnormality  Workstation performed: [id number]         XR foot 3+ views LEFT   Final Result by Kirk Blackburn MD (10/18 1342)      Nondisplaced interarticular fracture involving the medial aspect of the distal 1st metatarsal             Workstation performed: [id number]                    Procedures  Procedures       ED Course                               MDM  Number of Diagnoses or Management Options  Abrasion, knee: new and requires workup  Fracture of 1st metatarsal: new and requires workup  Left ankle sprain: new and requires workup  Diagnosis management comments: ddx includes but is not limited to fracture, contusion, sprain, strain, nerve injury, tendon injury, vascular injury, tendinitis, bursitis, dislocation, plan xray to rule out fracture or dislocation  Xray images left knee independently visualized and interpreted by me - no acute fracture or dislocation  Xray images Left ankle independently visualized and interpreted by me - no acute fracture or dislocation  Xray images left foot independently visualized and interpreted by me - positive fracture of distal 1st metatarsal without significant displacement  There is an abnormality on the lateral view of the superior aspect of the navicular bone which appears chronic  I discussed all x-ray results with the patient at bedside  Discussed treatment of the abrasion with Neosporin, cleanse with soap and water and keep the dressing on until healed  Discussed fracture of the left 1st metatarsal   Dose treatment plan including crutches, rest, ice, elevation use of cast shoe  Discussed follow-up with Orthopedics in 2-3 days for recheck and further evaluation  Discussed use NSAIDs  Patient reports he is currently in between insurances    Case management consult placed to help patient with achieving outpatient orthopedic follow-up  Work note given  Follow up with pcp and orthopedics in 2-3 days instructed  Reviewed reasons to return to ed  Patient verbalized understanding of diagnosis and agreement with discharge plan of care as well as understanding of reasons to return to ed  Splint check: location left foot,   Type dynamic cast shoe, SILT, NVI, cap refill less than 3 seconds  Skin intact without redness or breakdown  Splint applied by ed tech  Splint checked by me               Amount and/or Complexity of Data Reviewed  Tests in the radiology section of CPT®: ordered and reviewed  Discussion of test results with the performing providers: yes  Obtain history from someone other than the patient: yes (parent)  Review and summarize past medical records: yes  Independent visualization of images, tracings, or specimens: yes    Patient Progress  Patient progress: stable      Disposition  Final diagnoses:   Fracture of 1st metatarsal   Left ankle sprain   Abrasion, knee     Time reflects when diagnosis was documented in both MDM as applicable and the Disposition within this note     Time User Action Codes Description Comment    10/18/2019  2:07 PM Lynnea Bosworth Add [C79 513O] Fracture of 1st metatarsal     10/18/2019  2:07 PM Lynnea Bosworth Add [A00 373P] Left ankle sprain     10/18/2019  2:07 PM Lynnea Bosworth Add [S80 [id number]] Abrasion, knee       ED Disposition     ED Disposition Condition Date/Time Comment    Discharge Stable Fri Oct 18, 2019  2:06 PM Clint Joe discharge to home/self care              Follow-up Information     Follow up With Specialties Details Why Contact Info Additional [facility] Emergency Department Emergency Medicine Go to  If symptoms worsen [facility] [address] [facility], [facility], Idalia Joseph, [address] [facility], MD Orthopedic Surgery Call in 1 day for further evaluation of symptoms [address] [facility]  [address]  [facility] [address]  [phone]       Delma Calderon MD Family Medicine Call in 1 day for further evaluation of symptoms [address] [place] [address] Case Management  Call in 1 day for assistance with outpatient follow up  [address] [facility] [address] [place] [facility]NAGEMENT [facility], [address] [place] [address], [place], [place], [address]          Discharge Medication List as of 10/18/2019  2:10 PM      START taking these medications    Details   HYDROcodone-acetaminophen (NORCO) 5-325 mg per tablet Take 1 tablet by mouth every 6 (six) hours as needed for pain (foot fracture/initial rx ) for up to 3 daysMax Daily Amount: 4 tablets, Starting Fri 10/18/2019, Until Mon 10/21/2019, Print      ibuprofen (MOTRIN) 400 mg tablet Take 1 tablet (400 mg total) by mouth every 6 (six) hours as needed for moderate pain (foot fracture/intial rx ) for up to 5 days, Starting Fri 10/18/2019, Until Wed 10/23/2019, Print         CONTINUE these medications which have NOT CHANGED    Details   albuterol (2 5 mg/3 mL) 0 083 % nebulizer solution Take 3 mL (2 5 mg total) by nebulization every 6 (six) hours as needed for wheezing, Starting Sun 3/25/2018, Print      albuterol (PROVENTIL HFA,VENTOLIN HFA) 90 mcg/act inhaler Inhale 2 puffs every 6 (six) hours as needed for wheezing, Historical Med      ALBUTEROL IN Inhale 2 puffs, Historical Med      Blood Glucose Monitoring Suppl (ACURA BLOOD GLUCOSE METER) w/Device KIT Check BS once daily    One touch, Dx- 11 99, Historical Med      ciprofloxacin (CIPRO) 500 mg tablet Take by mouth, Historical Med      !! dicyclomine (BENTYL) 20 mg tablet Take 1 tablet (20 mg total) by mouth 2 (two) times a day as needed (abdominal pain), Starting u 9/6/2018, Print      !! dicyclomine (BENTYL) 20 mg tablet Take 1 tablet (20 mg total) by mouth every 6 (six) hours, Starting Thu 3/14/2019, Normal      gabapentin (NEURONTIN) 300 mg capsule Take 300 mg by mouth, Starting Wed 4/25/2018, Historical Med      glucose blood test strip 1 STRIP BY MISCELLANEOUS ROUTE DAILY  CHECK BS ONCE DAILY  ONE TOUCH, DX- 11 99, Historical Med      hydrocortisone 2 5 % cream Apply topically, Starting Thu 5/10/2018, Until Fri 5/10/2019, Historical Med      !! Lancets (ONETOUCH ULTRASOFT) lancets Check BS once daily  One touch, Dx- 11 99, Historical Med      loratadine (CLARITIN) 10 mg tablet TAKE 1 TABLET (10 MG TOTAL) BY MOUTH DAILY  , Historical Med      lubiprostone (AMITIZA) 24 mcg capsule Take 1 capsule (24 mcg total) by mouth 2 (two) times a day with meals, Starting Thu 3/14/2019, Normal      metFORMIN (GLUCOPHAGE) 500 mg tablet TAKE 1 TABLET (500 MG TOTAL) BY MOUTH 2 (TWO) TIMES A DAY WITH MEALS , Historical Med      montelukast (SINGULAIR) 10 mg tablet Take 10 mg by mouth daily at bedtime, Historical Med      omeprazole (PRILOSEC) 20 mg delayed release capsule Take 20 mg by mouth, Starting Wed 5/16/2018, Historical Med      ondansetron (ZOFRAN-ODT) 4 mg disintegrating tablet Take 1 tablet (4 mg total) by mouth every 8 (eight) hours as needed for nausea or vomiting, Starting Thu 9/6/2018, Print      !! ONETOUCH DELICA LANCETS FINE MISC USE AS DIRECTED DAILY, Historical Med       !! - Potential duplicate medications found  Please discuss with provider  No discharge procedures on file      ED Provider  Electronically Signed by           Brandon Taveras PA-C  10/19/19 Daniel Jansen 50 Nick Grant PA-C  10/19/19 6719

## 2019-10-22 ENCOUNTER — OFFICE VISIT (OUTPATIENT)
Dept: OBGYN CLINIC | Facility: CLINIC | Age: 34
End: 2019-10-22

## 2019-10-22 VITALS
SYSTOLIC BLOOD PRESSURE: 132 MMHG | HEART RATE: 79 BPM | DIASTOLIC BLOOD PRESSURE: 93 MMHG | BODY MASS INDEX: 35.73 KG/M2 | WEIGHT: 287.4 LBS | HEIGHT: 75 IN

## 2019-10-22 DIAGNOSIS — S93.325A LISFRANC DISLOCATION, LEFT, INITIAL ENCOUNTER: Primary | ICD-10-CM

## 2019-10-22 DIAGNOSIS — S92.315A CLOSED NONDISPLACED FRACTURE OF FIRST METATARSAL BONE OF LEFT FOOT, INITIAL ENCOUNTER: ICD-10-CM

## 2019-10-22 PROCEDURE — 99203 OFFICE O/P NEW LOW 30 MIN: CPT | Performed by: ORTHOPAEDIC SURGERY

## 2019-10-22 NOTE — PROGRESS NOTES
ANNIE Hayden  Attending, Orthopaedic Surgery  [facility] [address] [facility]      ORTHOPAEDIC FOOT AND ANKLE CLINIC VISIT     Assessment:     Encounter Diagnoses   Name Primary?  Lisfranc dislocation, left, initial encounter Yes    Closed nondisplaced fracture of first metatarsal bone of left foot, initial encounter          Plan:   · The patient verbalized understanding of exam findings and treatment plan  We engaged in the shared decision-making process and treatment options were discussed at length with the patient  Surgical and conservative management discussed today along with risks and benefits  · A MRI left foot was ordered to evaluate for a Lisfranc's Dislocation / Fracture  He is unable to bear weight and has a painful midfoot  · He is to continue weight bearing in a post op shoe  · He can take Tylenol for pain control as needed  · Rest, ice and elevation advised  · Work restrictions given, will be updated at next visit  Return for Recheck left foot after MRI  Genia Barton History of Present Illness:   Chief Complaint:   Chief Complaint   Patient presents with    Left Foot - Fracture     Humble Willett is a 30 y o  male who is being seen for left foot pain x 5 days  He states he slipped and fell with the dorsal aspect of his foot hitting a brick floor with body weight coming down on it  Pain is localized at 1st mtpj and 1st metatarsal head with minimal radiating and described as sharp and severe  Patient denies numbness, tingling or radicular pain  Denies history of neuropathy  Patient does not smoke, doest have diabetes and does not take blood thinners  Patient denies family history of anesthesia complications and has not had any complications with anesthesia       Pain/symptom timing:  Worse during the day when active  Pain/symptom context:  Worse with activites and work  Pain/symptom modifying factors:  Rest makes better, activities make worse  Pain/symptom associated signs/symptoms: none    Prior treatment   · NSAIDsNo   · Injections No   · Bracing/Orthotics Yes  (post op shoe)  · Physical Therapy No     Orthopedic Surgical History:   See below    Past Medical, Surgical and Social History:  Past Medical History:  has a past medical history of Asthma, Diabetes mellitus ([address] ), Hypertension, Nephrolithiasis, and Nerve damage  Problem List: does not have any pertinent problems on file  Past Surgical History:  has a past surgical history that includes Dental surgery and pr cysto/uretero w/lithotripsy &indwell stent insrt (Left, 7/26/2017)  Family History: family history includes Diabetes in his father and maternal grandfather; Hypertension in his father; Thyroid disease in his mother  Social History:  reports that he has never smoked  He has never used smokeless tobacco  He reports that he does not drink alcohol or use drugs  Current Medications: has a current medication list which includes the following prescription(s): albuterol, albuterol, albuterol, ciprofloxacin, dicyclomine, dicyclomine, gabapentin, glucose blood, ibuprofen, onetouch ultrasoft, loratadine, lubiprostone, metformin, montelukast, omeprazole, ondansetron, onetouch delica lancets fine, acura blood glucose meter, and hydrocortisone  Allergies: is allergic to amoxicillin and penicillins       Review of Systems:  General- denies fever/chills  HEENT- denies hearing loss or sore throat  Eyes- denies eye pain or visual disturbances, denies red eyes  Respiratory- denies cough or SOB  Cardio- denies chest pain or palpitations  GI- denies abdominal pain  Endocrine- denies urinary frequency  Urinary- denies pain with urination  Musculoskeletal- Negative except noted above  Skin- denies rashes or wounds  Neurological- denies dizziness or headache  Psychiatric- denies anxiety or difficulty concentrating    Physical Exam:   /93 (BP Location: Left arm, Patient Position: Sitting, Cuff Size: Adult)   Pulse 79  6' 3" (1 905 m)   Wt 130 kg (287 lb 6 4 oz)   BMI 35 92 kg/m²   General/Constitutional: No apparent distress: well-nourished and well developed  Eyes: normal ocular motion  Lymphatic: No appreciable lymphadenopathy  Respiratory: Non-labored breathing  Vascular: No edema, swelling or tenderness, except as noted in detailed exam   Integumentary: No impressive skin lesions present, except as noted in detailed exam   Neuro: No ataxia or tremors noted  Psych: Normal mood and affect, oriented to person, place and time  Appropriate affect  Musculoskeletal: Normal, except as noted in detailed exam and in HPI  Examination    Left    Gait He presents in a post op shoe  Musculoskeletal Tender to palpation at 1st mtpj, 1st metatarsal head, lisfranc's ligament tendernessl  Skin   Moderate swelling dorsal foot  Nails Normal    Range of Motion  1st mtpj decreased dorsiflexion , decreased  plantarflexion  Subtalar motion: wnl    Stability Stable    Muscle Strength 5/5 tibialis anterior  5/5 gastrocnemius-soleus  5/5 posterior tibialis  5/5 peroneal/eversion strength  5/5 EHL  5/5 FHL    Neurologic Normal    Sensation Intact to light touch throughout sural, saphenous, superficial peroneal, deep peroneal and medial/lateral plantar nerve distributions  Stowe-Julia 5 07 filament (10g) testing deferred  Cardiovascular Brisk capillary refill < 2 seconds,intact DP and PT pulses    Special Tests None      Imaging Studies:   3 views of the left foot were taken, reviewed and interpreted independently that demonstrate non displaced fracture of the 1st metatarsal head  Reviewed by me personally  Scribe Attestation    I,:   Arleta Ormond am acting as a scribe while in the presence of the attending physician :        I,:   Nhi Edmonds MD personally performed the services described in this documentation    as scribed in my presence :            Jeralyn Hales Lachman, MD  Foot & Ankle Surgery   Department of [address] [place] [address]      I personally performed the service  Ima Pimenta Lachman, MD

## 2019-10-22 NOTE — LETTER
October 22, 2019     Patient: Hieu Morales   YOB: 1985   Date of Visit: 10/22/2019       To Whom it May Concern:    Marya Schilder is under my professional care  He was seen in my office on 10/22/2019  He is to be on sedentary desk duty only, no prolonged standing, no prolonged weight bearing or walking of the left foot, restrictions in place until seen at his next visit after the MRI  If you have any questions or concerns, please don't hesitate to call           Sincerely,          Vika Barrios MD        CC: Hieu Morales

## 2019-11-08 ENCOUNTER — APPOINTMENT (OUTPATIENT)
Dept: RADIOLOGY | Facility: AMBULARY SURGERY CENTER | Age: 34
End: 2019-11-08
Attending: ORTHOPAEDIC SURGERY

## 2019-11-08 ENCOUNTER — OFFICE VISIT (OUTPATIENT)
Dept: OBGYN CLINIC | Facility: CLINIC | Age: 34
End: 2019-11-08

## 2019-11-08 VITALS
DIASTOLIC BLOOD PRESSURE: 85 MMHG | WEIGHT: 287 LBS | BODY MASS INDEX: 35.68 KG/M2 | HEART RATE: 82 BPM | HEIGHT: 75 IN | SYSTOLIC BLOOD PRESSURE: 138 MMHG

## 2019-11-08 DIAGNOSIS — S92.315A CLOSED NONDISPLACED FRACTURE OF FIRST METATARSAL BONE OF LEFT FOOT, INITIAL ENCOUNTER: ICD-10-CM

## 2019-11-08 DIAGNOSIS — M79.672 PAIN IN LEFT FOOT: Primary | ICD-10-CM

## 2019-11-08 DIAGNOSIS — M79.672 PAIN IN LEFT FOOT: ICD-10-CM

## 2019-11-08 PROCEDURE — 99213 OFFICE O/P EST LOW 20 MIN: CPT | Performed by: ORTHOPAEDIC SURGERY

## 2019-11-08 PROCEDURE — 73620 X-RAY EXAM OF FOOT: CPT

## 2019-11-08 NOTE — LETTER
November 8, 2019     Patient: Nicolasa Felix   YOB: 1985   Date of Visit: 11/8/2019       To Whom it May Concern:    Regine Malagon is under my professional care  He was seen in my office on 11/8/2019  He has a fracture of his left 1st metatarsal bone  He may return to work on may return to work on 11/22/2019 without restrictions       If you have any questions or concerns, please don't hesitate to call           Sincerely,          Katelyn Arauz MD        CC: No Recipients

## 2019-11-08 NOTE — PROGRESS NOTES
ANNIE Logan  Attending, Orthopaedic Surgery  [facility] [address] [facility]      ORTHOPAEDIC FOOT AND ANKLE CLINIC VISIT     Assessment:     Encounter Diagnoses   Name Primary?  Pain in left foot Yes    Closed nondisplaced fracture of first metatarsal bone of left foot, initial encounter             Plan:   · The patient verbalized understanding of exam findings and treatment plan  We engaged in the shared decision-making process and treatment options were discussed at length with the patient  Surgical and conservative management discussed today along with risks and benefits  · He was unable to obtain the MRI of the left foot due to cost    · The bilateral weight bearing AP x-rays do not show any evidence of instability of the lisfranc ligament  No tenderness to palpation over the lisfranc joint today  · Continue WBAT LLE in post-op shoe for the nondisplaced metatarsal head fracture  He may wean out of the boot and into a sneaker when he is able to tolerate  · Continue tylenol, rest, ice, and elevation  · Given work note stating he may return to work in 2 weeks  Return if symptoms worsen or fail to improve  History of Present Illness:   Chief Complaint:   Chief Complaint   Patient presents with   [address] [place] [address] Way is a 30 y o  male who is being seen in follow-up for left foot injury  When we last saw he we recommended MRI to evaluate for lisfranc dislocation  He was unable to obtain the MRI due to lack of medical insurance  Pain has significantly improved  Residual pain is localized at lateral 1st metatarsal head with minimal radiating and described as sharp and mild  He reports the swelling and pain in his midfoot has resolved        Pain/symptom timing:  Worse during the day when active  Pain/symptom context:  Worse with activites and work  Pain/symptom modifying factors:  Rest makes better, activities make worse  Pain/symptom associated signs/symptoms: none    Prior treatment   · NSAIDsNo   · Injections No   · Bracing/Orthotics Yes    · Physical Therapy No     Orthopedic Surgical History:   See below     Past Medical, Surgical and Social History:  Past Medical History:  has a past medical history of Asthma, Diabetes mellitus ([address] ), Hypertension, Nephrolithiasis, and Nerve damage  Problem List: does not have any pertinent problems on file  Past Surgical History:  has a past surgical history that includes Dental surgery and pr cysto/uretero w/lithotripsy &indwell stent insrt (Left, 7/26/2017)  Family History: family history includes Diabetes in his father and maternal grandfather; Hypertension in his father; Thyroid disease in his mother  Social History:  reports that he has never smoked  He has never used smokeless tobacco  He reports that he does not drink alcohol or use drugs  Current Medications: has a current medication list which includes the following prescription(s): albuterol, albuterol, albuterol, ciprofloxacin, dicyclomine, dicyclomine, gabapentin, glucose blood, onetouch ultrasoft, loratadine, lubiprostone, metformin, montelukast, omeprazole, ondansetron, onetouch delica lancets fine, acura blood glucose meter, hydrocortisone, and ibuprofen  Allergies: is allergic to amoxicillin and penicillins       Review of Systems:  General- denies fever/chills  HEENT- denies hearing loss or sore throat  Eyes- denies eye pain or visual disturbances, denies red eyes  Respiratory- denies cough or SOB  Cardio- denies chest pain or palpitations  GI- denies abdominal pain  Endocrine- denies urinary frequency  Urinary- denies pain with urination  Musculoskeletal- Negative except noted above  Skin- denies rashes or wounds  Neurological- denies dizziness or headache  Psychiatric- denies anxiety or difficulty concentrating    Physical Exam:   /85 (BP Location: Right arm, Patient Position: Sitting, Cuff Size: Adult)   Pulse 82   Ht 6' 3" (1 905 m) Wt 130 kg (287 lb)   BMI 35 87 kg/m²   General/Constitutional: No apparent distress: well-nourished and well developed  Eyes: normal ocular motion  Lymphatic: No appreciable lymphadenopathy  Respiratory: Non-labored breathing  Vascular: No edema, swelling or tenderness, except as noted in detailed exam   Integumentary: No impressive skin lesions present, except as noted in detailed exam   Neuro: No ataxia or tremors noted  Psych: Normal mood and affect, oriented to person, place and time  Appropriate affect  Musculoskeletal: Normal, except as noted in detailed exam and in HPI  Examination    Left    Gait Ambulating in post-op shoe   Musculoskeletal Tender to palpation at 1st metatarsal head laterally  Nontender to palpation over the midfoot or lisfranc joint    Skin Normal       Nails Normal    Range of Motion  full degrees dorsiflexion, full degrees plantarflexion  Subtalar motion: normal    Stability Stable    Muscle Strength 5/5 tibialis anterior  5/5 gastrocnemius-soleus  5/5 posterior tibialis  5/5 peroneal/eversion strength  5/5 EHL  5/5 FHL    Neurologic Normal    Sensation Intact to light touch throughout sural, saphenous, superficial peroneal, deep peroneal and medial/lateral plantar nerve distributions  Republic-Julia 5 07 filament (10g) testing deferred  Cardiovascular Brisk capillary refill < 2 seconds,intact DP and PT pulses    Special Tests None      Imaging Studies:   Bilateral weight bearing AP x-rays of the foot were obtained, reviewed, and interpreted which demonstrate no evidence of lisfranc ligament instability or widening of the lisfranc joint  Unchanged alignment of the lateral 1st metatarsal head fracture         Scribe Attestation    I,:   Robert Yu PA-C am acting as a scribe while in the presence of the attending physician :        I,:   Jhony Toledo MD personally performed the services described in this documentation    as scribed in my presence :              Mabel Veronica Felipa Linares MD  Foot & Ankle Surgery   Department of [address] [place] [address]      I personally performed the service  Umberto Diamond Lachman, MD

## 2020-02-14 ENCOUNTER — HOSPITAL ENCOUNTER (EMERGENCY)
Facility: HOSPITAL | Age: 35
Discharge: HOME | End: 2020-02-14
Attending: EMERGENCY MEDICINE | Admitting: EMERGENCY MEDICINE
Payer: COMMERCIAL

## 2020-02-14 ENCOUNTER — TELEPHONE (OUTPATIENT)
Dept: UROLOGY | Facility: MEDICAL CENTER | Age: 35
End: 2020-02-14

## 2020-02-14 ENCOUNTER — APPOINTMENT (EMERGENCY)
Dept: CT IMAGING | Facility: HOSPITAL | Age: 35
End: 2020-02-14
Payer: COMMERCIAL

## 2020-02-14 VITALS
OXYGEN SATURATION: 97 % | WEIGHT: 293.21 LBS | RESPIRATION RATE: 16 BRPM | DIASTOLIC BLOOD PRESSURE: 65 MMHG | SYSTOLIC BLOOD PRESSURE: 122 MMHG | BODY MASS INDEX: 36.65 KG/M2 | TEMPERATURE: 98 F | HEART RATE: 69 BPM

## 2020-02-14 DIAGNOSIS — N20.0 NEPHROLITHIASIS: ICD-10-CM

## 2020-02-14 DIAGNOSIS — T14.8XXA MUSCULOSKELETAL STRAIN: ICD-10-CM

## 2020-02-14 DIAGNOSIS — R10.9 RIGHT FLANK PAIN: ICD-10-CM

## 2020-02-14 DIAGNOSIS — R10.9 FLANK PAIN: Primary | ICD-10-CM

## 2020-02-14 LAB
ALBUMIN SERPL BCP-MCNC: 3.9 G/DL (ref 3.5–5)
ALP SERPL-CCNC: 52 U/L (ref 46–116)
ALT SERPL W P-5'-P-CCNC: 36 U/L (ref 12–78)
ANION GAP SERPL CALCULATED.3IONS-SCNC: 6 MMOL/L (ref 4–13)
AST SERPL W P-5'-P-CCNC: 21 U/L (ref 5–45)
BACTERIA UR QL AUTO: ABNORMAL /HPF
BASOPHILS # BLD AUTO: 0.04 THOUSANDS/ΜL (ref 0–0.1)
BASOPHILS NFR BLD AUTO: 0 % (ref 0–1)
BILIRUB DIRECT SERPL-MCNC: 0.1 MG/DL (ref 0–0.2)
BILIRUB SERPL-MCNC: 0.5 MG/DL (ref 0.2–1)
BILIRUB UR QL STRIP: NEGATIVE
BUN SERPL-MCNC: 11 MG/DL (ref 5–25)
CALCIUM SERPL-MCNC: 9.2 MG/DL (ref 8.3–10.1)
CHLORIDE SERPL-SCNC: 102 MMOL/L (ref 100–108)
CLARITY UR: CLEAR
CO2 SERPL-SCNC: 32 MMOL/L (ref 21–32)
COLOR UR: YELLOW
CREAT SERPL-MCNC: 0.8 MG/DL (ref 0.6–1.3)
EOSINOPHIL # BLD AUTO: 0.1 THOUSAND/ΜL (ref 0–0.61)
EOSINOPHIL NFR BLD AUTO: 1 % (ref 0–6)
ERYTHROCYTE [DISTWIDTH] IN BLOOD BY AUTOMATED COUNT: 12.3 % (ref 11.6–15.1)
GFR SERPL CREATININE-BSD FRML MDRD: 117 ML/MIN/1.73SQ M
GLUCOSE SERPL-MCNC: 80 MG/DL (ref 65–140)
GLUCOSE UR STRIP-MCNC: NEGATIVE MG/DL
HCT VFR BLD AUTO: 44.7 % (ref 36.5–49.3)
HGB BLD-MCNC: 14.6 G/DL (ref 12–17)
HGB UR QL STRIP.AUTO: ABNORMAL
IMM GRANULOCYTES # BLD AUTO: 0.03 THOUSAND/UL (ref 0–0.2)
IMM GRANULOCYTES NFR BLD AUTO: 0 % (ref 0–2)
KETONES UR STRIP-MCNC: NEGATIVE MG/DL
LEUKOCYTE ESTERASE UR QL STRIP: NEGATIVE
LYMPHOCYTES # BLD AUTO: 2.67 THOUSANDS/ΜL (ref 0.6–4.47)
LYMPHOCYTES NFR BLD AUTO: 29 % (ref 14–44)
MCH RBC QN AUTO: 28 PG (ref 26.8–34.3)
MCHC RBC AUTO-ENTMCNC: 32.7 G/DL (ref 31.4–37.4)
MCV RBC AUTO: 86 FL (ref 82–98)
MONOCYTES # BLD AUTO: 0.76 THOUSAND/ΜL (ref 0.17–1.22)
MONOCYTES NFR BLD AUTO: 8 % (ref 4–12)
NEUTROPHILS # BLD AUTO: 5.65 THOUSANDS/ΜL (ref 1.85–7.62)
NEUTS SEG NFR BLD AUTO: 62 % (ref 43–75)
NITRITE UR QL STRIP: NEGATIVE
NON-SQ EPI CELLS URNS QL MICRO: ABNORMAL /HPF
NRBC BLD AUTO-RTO: 0 /100 WBCS
PH UR STRIP.AUTO: 6 [PH]
PLATELET # BLD AUTO: 382 THOUSANDS/UL (ref 149–390)
PMV BLD AUTO: 9.7 FL (ref 8.9–12.7)
POTASSIUM SERPL-SCNC: 4.5 MMOL/L (ref 3.5–5.3)
PROT SERPL-MCNC: 8.2 G/DL (ref 6.4–8.2)
PROT UR STRIP-MCNC: ABNORMAL MG/DL
RBC # BLD AUTO: 5.21 MILLION/UL (ref 3.88–5.62)
RBC #/AREA URNS AUTO: ABNORMAL /HPF
SODIUM SERPL-SCNC: 140 MMOL/L (ref 136–145)
SP GR UR STRIP.AUTO: 1.02 (ref 1–1.03)
UROBILINOGEN UR QL STRIP.AUTO: 0.2 E.U./DL
WBC # BLD AUTO: 9.25 THOUSAND/UL (ref 4.31–10.16)
WBC #/AREA URNS AUTO: ABNORMAL /HPF

## 2020-02-14 PROCEDURE — 74177 CT ABD & PELVIS W/CONTRAST: CPT

## 2020-02-14 PROCEDURE — 96374 THER/PROPH/DIAG INJ IV PUSH: CPT

## 2020-02-14 PROCEDURE — 85025 COMPLETE CBC W/AUTO DIFF WBC: CPT | Performed by: EMERGENCY MEDICINE

## 2020-02-14 PROCEDURE — 96375 TX/PRO/DX INJ NEW DRUG ADDON: CPT

## 2020-02-14 PROCEDURE — 80076 HEPATIC FUNCTION PANEL: CPT | Performed by: EMERGENCY MEDICINE

## 2020-02-14 PROCEDURE — 80048 BASIC METABOLIC PNL TOTAL CA: CPT | Performed by: EMERGENCY MEDICINE

## 2020-02-14 PROCEDURE — 99284 EMERGENCY DEPT VISIT MOD MDM: CPT | Performed by: EMERGENCY MEDICINE

## 2020-02-14 PROCEDURE — 81001 URINALYSIS AUTO W/SCOPE: CPT | Performed by: EMERGENCY MEDICINE

## 2020-02-14 PROCEDURE — 96361 HYDRATE IV INFUSION ADD-ON: CPT

## 2020-02-14 PROCEDURE — 99284 EMERGENCY DEPT VISIT MOD MDM: CPT

## 2020-02-14 PROCEDURE — 36415 COLL VENOUS BLD VENIPUNCTURE: CPT | Performed by: EMERGENCY MEDICINE

## 2020-02-14 PROCEDURE — 96376 TX/PRO/DX INJ SAME DRUG ADON: CPT

## 2020-02-14 RX ORDER — KETOROLAC TROMETHAMINE 30 MG/ML
15 INJECTION, SOLUTION INTRAMUSCULAR; INTRAVENOUS ONCE
Status: COMPLETED | OUTPATIENT
Start: 2020-02-14 | End: 2020-02-14

## 2020-02-14 RX ORDER — CYCLOBENZAPRINE HCL 10 MG
10 TABLET ORAL 3 TIMES DAILY PRN
Qty: 10 TABLET | Refills: 0 | Status: SHIPPED | OUTPATIENT
Start: 2020-02-14

## 2020-02-14 RX ORDER — ONDANSETRON 2 MG/ML
4 INJECTION INTRAMUSCULAR; INTRAVENOUS ONCE
Status: COMPLETED | OUTPATIENT
Start: 2020-02-14 | End: 2020-02-14

## 2020-02-14 RX ORDER — DIAZEPAM 5 MG/ML
5 INJECTION, SOLUTION INTRAMUSCULAR; INTRAVENOUS ONCE
Status: COMPLETED | OUTPATIENT
Start: 2020-02-14 | End: 2020-02-14

## 2020-02-14 RX ORDER — IBUPROFEN 800 MG/1
800 TABLET ORAL 2 TIMES DAILY
Qty: 20 TABLET | Refills: 0 | Status: SHIPPED | OUTPATIENT
Start: 2020-02-14 | End: 2020-02-24

## 2020-02-14 RX ADMIN — IOHEXOL 100 ML: 350 INJECTION, SOLUTION INTRAVENOUS at 12:47

## 2020-02-14 RX ADMIN — SODIUM CHLORIDE 1000 ML: 0.9 INJECTION, SOLUTION INTRAVENOUS at 12:17

## 2020-02-14 RX ADMIN — ONDANSETRON 4 MG: 2 INJECTION INTRAMUSCULAR; INTRAVENOUS at 12:20

## 2020-02-14 RX ADMIN — KETOROLAC TROMETHAMINE 15 MG: 30 INJECTION, SOLUTION INTRAMUSCULAR at 12:20

## 2020-02-14 RX ADMIN — KETOROLAC TROMETHAMINE 15 MG: 30 INJECTION, SOLUTION INTRAMUSCULAR at 16:04

## 2020-02-14 RX ADMIN — DIAZEPAM 5 MG: 10 INJECTION, SOLUTION INTRAMUSCULAR; INTRAVENOUS at 16:04

## 2020-02-14 NOTE — TELEPHONE ENCOUNTER
Er Fu Please Triage    Complaint/diagnosis: ER Follow up kidney [employer]     History of Cancer No    Previous Urologist [facility]    Outside testing/where no     what kind of test no     Records requested/where n/a    Preferred Location  [facility]

## 2020-02-14 NOTE — ED PROVIDER NOTES
History  Chief Complaint   Patient presents with    Flank Pain     pt co of R sided flank pain onset 4 days ago, hx of kidney stones      HPI  17-year-old white male with a chief complaint of right-sided flank pain which started 3-4 days ago and now has radiated to the right lower abdomen and across his groin  Patient states he has a history of kidney stones and it feels similar  Patient denies any nausea or vomiting  Patient also does a lot of heavy lifting at his job and the pain increases with side bending and movement  Patient denies any burning on urination but does state that sometimes it is difficult to urinate  Patient states that he passed a couple kidney stones in the past but he also had lithotripsy for a couple as well  Prior to Admission Medications   Prescriptions Last Dose Informant Patient Reported? Taking? ALBUTEROL IN  Self Yes No   Sig: Inhale 2 puffs   Blood Glucose Monitoring Suppl ([email or web address] BLOOD GLUCOSE METER) w/Device KIT  Self Yes No   Sig: Check BS once daily  One touch, Dx- 11 99   Lancets (ONETOUCH ULTRASOFT) lancets  Self Yes No   Sig: Check BS once daily    One touch, Dx- 66 94   ONETOUCH DELICA LANCETS FINE MISC  Self Yes No   Sig: USE AS DIRECTED DAILY   albuterol (2 5 mg/3 mL) 0 083 % nebulizer solution  Self No No   Sig: Take 3 mL (2 5 mg total) by nebulization every 6 (six) hours as needed for wheezing   albuterol (PROVENTIL HFA,VENTOLIN HFA) 90 mcg/act inhaler  Self Yes No   Sig: Inhale 2 puffs every 6 (six) hours as needed for wheezing   ciprofloxacin (CIPRO) 500 mg tablet  Self Yes No   Sig: Take by mouth   dicyclomine (BENTYL) 20 mg tablet  Self No No   Sig: Take 1 tablet (20 mg total) by mouth 2 (two) times a day as needed (abdominal pain)   dicyclomine (BENTYL) 20 mg tablet   No No   Sig: Take 1 tablet (20 mg total) by mouth every 6 (six) hours   gabapentin (NEURONTIN) 300 mg capsule  Self Yes No   Sig: Take 300 mg by mouth   glucose blood test strip  Self Yes No Si STRIP BY MISCELLANEOUS ROUTE DAILY  CHECK BS ONCE DAILY  ONE TOUCH, DX- 11 99   hydrocortisone 2 5 % cream  Self Yes No   Sig: Apply topically   ibuprofen (MOTRIN) 400 mg tablet   No No   Sig: Take 1 tablet (400 mg total) by mouth every 6 (six) hours as needed for moderate pain (foot fracture/intial rx ) for up to 5 days   loratadine (CLARITIN) 10 mg tablet  Self Yes No   Sig: TAKE 1 TABLET (10 MG TOTAL) BY MOUTH DAILY  lubiprostone (AMITIZA) 24 mcg capsule   No No   Sig: Take 1 capsule (24 mcg total) by mouth 2 (two) times a day with meals   metFORMIN (GLUCOPHAGE) 500 mg tablet  Self Yes No   Sig: TAKE 1 TABLET (500 MG TOTAL) BY MOUTH 2 (TWO) TIMES A DAY WITH MEALS  montelukast (SINGULAIR) 10 mg tablet  Self Yes No   Sig: Take 10 mg by mouth daily at bedtime   omeprazole (PRILOSEC) 20 mg delayed release capsule  Self Yes No   Sig: Take 20 mg by mouth   ondansetron (ZOFRAN-ODT) 4 mg disintegrating tablet  Self No No   Sig: Take 1 tablet (4 mg total) by mouth every 8 (eight) hours as needed for nausea or vomiting      Facility-Administered Medications: None       Past Medical History:   Diagnosis Date    Asthma     Diabetes mellitus ([address] )     Hypertension     Nephrolithiasis     Nerve damage     feet, legs, hands       Past Surgical History:   Procedure Laterality Date    DENTAL SURGERY      [place] CYSTO/URETERO W/LITHOTRIPSY &INDWELL STENT INSRT Left 2017    Procedure: CYSTOSCOPY URETEROSCOPY WITH LITHOTRIPSY HOLMIUM LASER, RETROGRADE PYELOGRAM AND INSERTION STENT URETERAL;  Surgeon: Imelda Stuart MD;  [facility];  Service: Urology       Family History   Problem Relation Age of Onset    Diabetes Father     Hypertension Father     Diabetes Maternal Grandfather     Thyroid disease Mother      I have reviewed and agree with the history as documented      Social History     Tobacco Use    Smoking status: Never Smoker    Smokeless tobacco: Never Used   Substance Use Topics    Alcohol use: No    Drug use: No       Review of Systems   Constitutional: Negative for diaphoresis, fatigue and fever  HENT: Negative for congestion, ear pain, nosebleeds and sore throat  Eyes: Negative for photophobia, pain, discharge and visual disturbance  Respiratory: Negative for cough, choking, chest tightness, shortness of breath and wheezing  Cardiovascular: Negative for chest pain and palpitations  Gastrointestinal: Positive for abdominal pain  Negative for abdominal distention, diarrhea and vomiting  Genitourinary: Positive for difficulty urinating  Negative for dysuria, flank pain and frequency  Musculoskeletal: Positive for back pain  Negative for gait problem and joint swelling  Skin: Negative for color change and rash  Neurological: Negative for dizziness, syncope and headaches  Psychiatric/Behavioral: Negative for behavioral problems and confusion  The patient is not nervous/anxious  All other systems reviewed and are negative  Physical Exam  Physical Exam   Constitutional: He is oriented to person, place, and time  He appears well-developed and well-nourished  79-year-old white male lying on the stretcher complaining of right flank pain  HENT:   Head: Normocephalic and atraumatic  Mouth/Throat: Oropharynx is clear and moist    Eyes: Pupils are equal, round, and reactive to light  EOM are normal    Neck: Normal range of motion  Neck supple  Cardiovascular: Normal rate  Pulmonary/Chest: Effort normal and breath sounds normal  No stridor  No respiratory distress  He has no wheezes  He has no rales  Abdominal: Soft  Bowel sounds are normal  He exhibits no mass  There is tenderness  There is no rebound and no guarding  There is some mild tenderness to the right lower quadrant   Musculoskeletal: Normal range of motion  He exhibits tenderness  Back:  There is tenderness to the right flank on palpation     Neurological: He is alert and oriented to person, place, and time  No cranial nerve deficit  He exhibits normal muscle tone  Coordination normal    Skin: Skin is warm and dry  Psychiatric: He has a normal mood and affect  Nursing note and vitals reviewed        Vital Signs  ED Triage Vitals   Temperature Pulse Respirations Blood Pressure SpO2   02/14/20 1128 02/14/20 1128 02/14/20 1128 02/14/20 1128 02/14/20 1600   98 °F (36 7 °C) 70 18 127/99 97 %      Temp Source Heart Rate Source Patient Position - Orthostatic VS BP Location FiO2 (%)   02/14/20 1128 02/14/20 1128 02/14/20 1128 02/14/20 1128 --   Oral Monitor Sitting Left arm       Pain Score       02/14/20 1604       8           Vitals:    02/14/20 1128 02/14/20 1600 02/14/20 1630   BP: 127/99 125/90 122/65   Pulse: 70 68 69   Patient Position - Orthostatic VS: Sitting Lying Sitting         Visual Acuity      ED Medications  Medications   sodium chloride 0 9 % bolus 1,000 mL (0 mL Intravenous Stopped 2/14/20 1639)   ketorolac (TORADOL) injection 15 mg (15 mg Intravenous Given 2/14/20 1220)   ondansetron (ZOFRAN) injection 4 mg (4 mg Intravenous Given 2/14/20 1220)   iohexol (OMNIPAQUE) 350 MG/ML injection (MULTI-DOSE) 100 mL (100 mL Intravenous Given 2/14/20 1247)   diazepam (VALIUM) injection 5 mg (5 mg Intravenous Given 2/14/20 1604)   ketorolac (TORADOL) injection 15 mg (15 mg Intravenous Given 2/14/20 1604)       Diagnostic Studies  Results Reviewed     Procedure Component Value Units Date/Time    Basic metabolic panel [[id number]] Collected:  02/14/20 1216    Lab Status:  Final result Specimen:  Blood from Arm, Left Updated:  02/14/20 1240     Sodium 140 mmol/L      Potassium 4 5 mmol/L      Chloride 102 mmol/L      CO2 32 mmol/L      ANION GAP 6 mmol/L      BUN 11 mg/dL      Creatinine 0 80 mg/dL      Glucose 80 mg/dL      Calcium 9 2 mg/dL      eGFR 117 ml/min/1 73sq m     Narrative:       [place] guidelines for Chronic Kidney Disease (CKD):     Stage 1 with normal or high GFR (GFR > 90 mL/min/1 73 square meters)    Stage 2 Mild CKD (GFR = 60-89 mL/min/1 73 square meters)    Stage 3A Moderate CKD (GFR = 45-59 mL/min/1 73 square meters)    Stage 3B Moderate CKD (GFR = 30-44 mL/min/1 73 square meters)    Stage 4 Severe CKD (GFR = 15-29 mL/min/1 73 square meters)    Stage 5 End Stage CKD (GFR <15 mL/min/1 73 square meters)  Note: GFR calculation is accurate only with a steady state creatinine    Hepatic function panel [[id number]]  (Normal) Collected:  02/14/20 1216    Lab Status:  Final result Specimen:  Blood from Arm, Left Updated:  02/14/20 1240     Total Bilirubin 0 50 mg/dL      Bilirubin, Direct 0 10 mg/dL      Alkaline Phosphatase 52 U/L      AST 21 U/L      ALT 36 U/L      Total Protein 8 2 g/dL      Albumin 3 9 g/dL     Urine Microscopic [[id number]]  (Abnormal) Collected:  02/14/20 1203    Lab Status:  Final result Specimen:  Urine, Clean Catch Updated:  02/14/20 1226     RBC, UA 0-1 /hpf      WBC, UA 0-1 /hpf      Epithelial Cells Occasional /hpf      Bacteria, UA None Seen /hpf     CBC and differential [[id number]] Collected:  02/14/20 1216    Lab Status:  Final result Specimen:  Blood from Arm, Left Updated:  02/14/20 1224     WBC 9 25 Thousand/uL      RBC 5 21 Million/uL      Hemoglobin 14 6 g/dL      Hematocrit 44 7 %      MCV 86 fL      MCH 28 0 pg      MCHC 32 7 g/dL      RDW 12 3 %      MPV 9 7 fL      Platelets 002 Thousands/uL      nRBC 0 /100 WBCs      Neutrophils Relative 62 %      Immat GRANS % 0 %      Lymphocytes Relative 29 %      Monocytes Relative 8 %      Eosinophils Relative 1 %      Basophils Relative 0 %      Neutrophils Absolute 5 65 Thousands/µL      Immature Grans Absolute 0 03 Thousand/uL      Lymphocytes Absolute 2 67 Thousands/µL      Monocytes Absolute 0 76 Thousand/µL      Eosinophils Absolute 0 10 Thousand/µL      Basophils Absolute 0 04 Thousands/µL     UA w Reflex to Microscopic w Reflex to Culture [[id number]]  (Abnormal) Collected:  02/14/20 1200 Lab Status:  Final result Specimen:  Urine, Clean Catch Updated:  02/14/20 1215     Color, UA Yellow     Clarity, UA Clear     Specific Gravity, UA 1 025     pH, UA 6 0     Leukocytes, UA Negative     Nitrite, UA Negative     Protein, UA Trace mg/dl      Glucose, UA Negative mg/dl      Ketones, UA Negative mg/dl      Urobilinogen, UA 0 2 E U /dl      Bilirubin, UA Negative     Blood, UA Trace-Intact                 CT abdomen pelvis with contrast   Final Result by Radha Salvador MD (02/14 1321)      No CT findings of acute abnormality in the abdomen or pelvis  Bilateral nonobstructing renal calculi  No ureteric calculus or hydronephrosis  Hepatomegaly and probable hepatic steatosis  Workstation performed: [id number]                    Procedures  Procedures         ED Course      I reviewed patient's lab results and CT exam with the patient  Patient does have bilateral renal calculi however no obstruction or hydro nephrosis  Patient's pain is also musculoskeletal in nature and increases with side bending especially to the left  Patient states he had some relief with IV Toradol I will add some IV Valium now as a muscle relaxant and place patient on some high dose Motrin as well as Flexeril for the next few days and give him a work note until Tuesday  Patient was also referred to a urologist     Patient states that he has a history of IBS and would like a referral to a gastroenterologist as well  Patient's mom was here to drive him home  MDM     Differential diagnosis includes:  1  Right flank pain  2  Right abdominal pain  3  Rule out renal calculi  4  UTI  5   Musculoskeletal strain      Disposition  Final diagnoses:   Flank pain   Right flank pain   Nephrolithiasis   Musculoskeletal strain     Time reflects when diagnosis was documented in both MDM as applicable and the Disposition within this note     Time User Action Codes Description Comment    2/14/2020 3:08 PM Dhara Hensen Add [R10 9] Flank pain     2/14/2020  3:08 PM Dhara Hensen Add [R10 9] Right flank pain     2/14/2020  3:08 PM Dhara Hensen Add [N20 0] Nephrolithiasis     2/14/2020  3:08 PM Dhara Hensen Add Power Zimmerman  [address] Musculoskeletal strain       ED Disposition     ED Disposition Condition Date/Time Comment    Discharge Good Fri Feb 14, 2020  3:11 PM Noy Si discharge to home/self care  Follow-up Information     Follow up With Specialties Details Why Kelsie Martin MD Urology In 1 week  [address]      Frannie Amin MD Gastroenterology In 1 week Hx of IBS [address]  [place] [id number]   In 1 week            Discharge Medication List as of 2/14/2020  3:16 PM      START taking these medications    Details   cyclobenzaprine (FLEXERIL) 10 mg tablet Take 1 tablet (10 mg total) by mouth 3 (three) times a day as needed for muscle spasms for up to 10 doses Do NOT drive while taking this medicine, Starting Fri 2/14/2020, Print         CONTINUE these medications which have CHANGED    Details   ibuprofen (MOTRIN) 800 mg tablet Take 1 tablet (800 mg total) by mouth 2 (two) times a day for 10 days, Starting Fri 2/14/2020, Until Mon 2/24/2020, Print         CONTINUE these medications which have NOT CHANGED    Details   albuterol (2 5 mg/3 mL) 0 083 % nebulizer solution Take 3 mL (2 5 mg total) by nebulization every 6 (six) hours as needed for wheezing, Starting Sun 3/25/2018, Print      albuterol (PROVENTIL HFA,VENTOLIN HFA) 90 mcg/act inhaler Inhale 2 puffs every 6 (six) hours as needed for wheezing, Historical Med      ALBUTEROL IN Inhale 2 puffs, Historical Med      Blood Glucose Monitoring Suppl ([employer] BLOOD GLUCOSE METER) w/Device KIT Check BS once daily    One touch, Dx- 11 99, Historical Med      ciprofloxacin (CIPRO) 500 mg tablet Take by mouth, Historical Med      !! dicyclomine (BENTYL) 20 mg tablet Take 1 tablet (20 mg total) by mouth 2 (two) times a day as needed (abdominal pain), Starting Thu 9/6/2018, Print      !! dicyclomine (BENTYL) 20 mg tablet Take 1 tablet (20 mg total) by mouth every 6 (six) hours, Starting Thu 3/14/2019, Normal      gabapentin (NEURONTIN) 300 mg capsule Take 300 mg by mouth, Starting Wed 4/25/2018, Historical Med      glucose blood test strip 1 STRIP BY MISCELLANEOUS ROUTE DAILY  CHECK BS ONCE DAILY  ONE TOUCH, DX- 11 99, Historical Med      hydrocortisone 2 5 % cream Apply topically, Starting Thu 5/10/2018, Until Fri 5/10/2019, Historical Med      !! Lancets (ONETOUCH ULTRASOFT) lancets Check BS once daily  One touch, Dx- 11 99, Historical Med      loratadine (CLARITIN) 10 mg tablet TAKE 1 TABLET (10 MG TOTAL) BY MOUTH DAILY  , Historical Med      lubiprostone (AMITIZA) 24 mcg capsule Take 1 capsule (24 mcg total) by mouth 2 (two) times a day with meals, Starting Thu 3/14/2019, Normal      metFORMIN (GLUCOPHAGE) 500 mg tablet TAKE 1 TABLET (500 MG TOTAL) BY MOUTH 2 (TWO) TIMES A DAY WITH MEALS , Historical Med      montelukast (SINGULAIR) 10 mg tablet Take 10 mg by mouth daily at bedtime, Historical Med      omeprazole (PRILOSEC) 20 mg delayed release capsule Take 20 mg by mouth, Starting Wed 5/16/2018, Historical Med      ondansetron (ZOFRAN-ODT) 4 mg disintegrating tablet Take 1 tablet (4 mg total) by mouth every 8 (eight) hours as needed for nausea or vomiting, Starting Thu 9/6/2018, Print      !! ONETOUCH DELICA LANCETS FINE MISC USE AS DIRECTED DAILY, Historical Med       !! - Potential duplicate medications found  Please discuss with provider  No discharge procedures on file      PDMP Review     None          ED Provider  Electronically Signed by           Ronni Elias DO  02/14/20 2636

## 2020-02-14 NOTE — TELEPHONE ENCOUNTER
Patient previously managed by Mary Acosta  Patient had CYSTOSCOPY URETEROSCOPY WITH LITHOTRIPSY HOLMIUM LASER, RETROGRADE PYELOGRAM AND INSERTION STENT URETERAL (Left Bladder) on 7/26/17 with Dr Agarwal and was seen in the office on 8/4/17 by Baron Dutton  Patient was seen in the ER today for right flank pain  It does not look like Er note is compete  Urine testing was done and so was a CT abdomen pelvis w contrast  Patient was discharged with flexeril and motrin  Urine testing showed trace blood and trace protein (results under labs)    CT IMPRESSION:     No CT findings of acute abnormality in the abdomen or pelvis      Bilateral nonobstructing renal calculi  No ureteric calculus or hydronephrosis      Hepatomegaly and probable hepatic steatosis  Since stones are non obstructing can patient be scheduled in next available new patient appointment with an AP (next available new patient appointment is current 4/1/20)?

## 2020-02-17 NOTE — TELEPHONE ENCOUNTER
Patient has established with our practice  Can be provided with next available appointment, does not need to be a new patient appointment slot  Thank you

## 2020-02-17 NOTE — TELEPHONE ENCOUNTER
Patient previously managed by Dr Chad Zaman  Patient has non-obstructing kidney stones  When patient calls back please schedule patient with next available appointment with a PA  Office of patients choice  Called patient  VM box not set up yet could not leave a message  Called Patients mother Kun Grimm, per communication consent  Left VM for Rodrick Wilson to call our office, phone number left in message

## 2020-02-18 ENCOUNTER — OFFICE VISIT (OUTPATIENT)
Dept: UROLOGY | Facility: CLINIC | Age: 35
End: 2020-02-18
Payer: COMMERCIAL

## 2020-02-18 VITALS
HEART RATE: 86 BPM | SYSTOLIC BLOOD PRESSURE: 138 MMHG | BODY MASS INDEX: 36.28 KG/M2 | DIASTOLIC BLOOD PRESSURE: 90 MMHG | HEIGHT: 75 IN | WEIGHT: 291.8 LBS

## 2020-02-18 DIAGNOSIS — N20.0 NEPHROLITHIASIS: Primary | Chronic | ICD-10-CM

## 2020-02-18 LAB — POST-VOID RESIDUAL VOLUME, ML POC: 85 ML

## 2020-02-18 PROCEDURE — 99214 OFFICE O/P EST MOD 30 MIN: CPT | Performed by: PHYSICIAN ASSISTANT

## 2020-02-18 PROCEDURE — 51798 US URINE CAPACITY MEASURE: CPT | Performed by: PHYSICIAN ASSISTANT

## 2020-02-18 NOTE — PROGRESS NOTES
Assessment and plan:       1  Dysuria  - Recent urine testing was negative  - Patient has a slightly elevated PVR of 85 mL today  - Patient was encouraged to increase hydration with water as dark colored sodas can be a bladder irritant as can be uncontrolled diabetes and dehydration   - He will perform double voiding   - He will follow up in approximately 3 months, this will be after he sees our gastrointestinal colleagues, for symptom reassessment  2  History of kidney stones  - Patient was seen for flank pain and CT scan was negative for obstructing stones, positive for bilateral nonobstructing stones measuring up to 4 mm  - We will continue with observation at this time  Ruth Mackey PA-C      Chief Complaint     Chief Complaint   Patient presents with    Nephrolithiasis         History of Present Illness     Anastasiia Olivia is a 37 y o  male patient previously seen by Dr Nina Valencia for history of nephrolithiasis  He did require ureteroscopy in July of 2017 for a proximal 5 mm stone  He has since been seen by a urologist through [facility] for a perinephric hematoma  He is being seen today for ER follow-up after being seen on 02/14/2020 for right flank pain radiating to the testicles  CT scan of the abdomen and pelvis without contrast was performed which showed bilateral nonobstructing stones measuring up to 4 mm  No ureteral stones or hydronephrosis was seen  Urine testing was positive only for 0-1 red blood cells and 0-1 white blood cells per high-power field  Patient reports "random" pain and burning with urination  The pain he was having that urinated to his testicle at the time of emergency department visit has resolved  He does feel that his right testicle remains "inflamed " He does have a history of irritable bowel syndrome for which he takes laxatives on a regular basis  He reports that he has been having regular bowel movements    He has been referred to gastroenterology as well  Patient is also diabetic which is managed with metformin  He does report that he has been taking his sugars twice daily and they have been within his normal range, primarily below 150  He reports that through the day he drinks typically 1 can of dark color diet soda with meals and has been increasing hydration with lemon and lime seltzer  Laboratory     Lab Results   Component Value Date    CREATININE 0 80 02/14/2020       No results found for: PSA    Recent Results (from the past 1 hour(s))   POCT Measure PVR    Collection Time: 02/18/20  8:59 AM   Result Value Ref Range    POST-VOID RESIDUAL VOLUME, ML POC 85 mL         Review of Systems     Review of Systems   Constitutional: Negative for chills and fever  Respiratory: Negative for shortness of breath  Cardiovascular: Negative for chest pain  Gastrointestinal: Negative for constipation, diarrhea, nausea and vomiting  Genitourinary: Positive for dysuria, flank pain, scrotal swelling and testicular pain  Negative for difficulty urinating, frequency, hematuria and urgency  Allergies     Allergies   Allergen Reactions    Amoxicillin Hives    Penicillins Hives       Physical Exam     Physical Exam   Constitutional: He is oriented to person, place, and time  He appears well-developed and well-nourished  No distress  HENT:   Head: Normocephalic and atraumatic  Right Ear: External ear normal    Left Ear: External ear normal    Nose: Nose normal    Eyes: Right eye exhibits no discharge  Left eye exhibits no discharge  No scleral icterus  Cardiovascular: Normal rate  Pulmonary/Chest: Effort normal    Abdominal: Soft  He exhibits no distension  There is tenderness (Mild, suprapubic)  Hernia confirmed negative in the right inguinal area and confirmed negative in the left inguinal area  Genitourinary: Testes normal and penis normal  Right testis shows no mass, no swelling and no tenderness   Right testis is descended  Cremasteric reflex is not absent on the right side  Left testis shows no mass, no swelling and no tenderness  Left testis is descended  Cremasteric reflex is not absent on the left side  Circumcised  No hypospadias, penile erythema or penile tenderness  No discharge found  Genitourinary Comments: Negative for CVA tenderness bilaterally   Musculoskeletal:   Ambulates independently   Neurological: He is alert and oriented to person, place, and time  Skin: Skin is warm and dry  He is not diaphoretic  Psychiatric: He has a normal mood and affect  His behavior is normal  Judgment and thought content normal    Nursing note and vitals reviewed  Vital Signs     Vitals:    02/18/20 0850   BP: 138/90   BP Location: Right arm   Patient Position: Sitting   Cuff Size: Standard   Pulse: 86   Weight: 132 kg (291 lb 12 8 oz)   Height: 6' 3" (1 905 m)         Current Medications       Current Outpatient Medications:     albuterol (2 5 mg/3 mL) 0 083 % nebulizer solution, Take 3 mL (2 5 mg total) by nebulization every 6 (six) hours as needed for wheezing, Disp: 75 mL, Rfl: 0    albuterol (PROVENTIL HFA,VENTOLIN HFA) 90 mcg/act inhaler, Inhale 2 puffs every 6 (six) hours as needed for wheezing, Disp: , Rfl:     ALBUTEROL IN, Inhale 2 puffs, Disp: , Rfl:     Blood Glucose Monitoring Suppl (ACURA BLOOD GLUCOSE METER) w/Device KIT, Check BS once daily    One touch, Dx- 11 99, Disp: , Rfl:     ciprofloxacin (CIPRO) 500 mg tablet, Take by mouth, Disp: , Rfl:     cyclobenzaprine (FLEXERIL) 10 mg tablet, Take 1 tablet (10 mg total) by mouth 3 (three) times a day as needed for muscle spasms for up to 10 doses Do NOT drive while taking this medicine, Disp: 10 tablet, Rfl: 0    dicyclomine (BENTYL) 20 mg tablet, Take 1 tablet (20 mg total) by mouth 2 (two) times a day as needed (abdominal pain), Disp: 20 tablet, Rfl: 0    dicyclomine (BENTYL) 20 mg tablet, Take 1 tablet (20 mg total) by mouth every 6 (six) hours, Disp: 90 tablet, Rfl: 3    gabapentin (NEURONTIN) 300 mg capsule, Take 300 mg by mouth, Disp: , Rfl:     glucose blood test strip, 1 STRIP BY MISCELLANEOUS ROUTE DAILY  CHECK BS ONCE DAILY  ONE TOUCH, DX- 11 99, Disp: , Rfl:     hydrocortisone 2 5 % cream, Apply topically, Disp: , Rfl:     ibuprofen (MOTRIN) 400 mg tablet, Take 1 tablet (400 mg total) by mouth every 6 (six) hours as needed for moderate pain (foot fracture/intial rx ) for up to 5 days, Disp: 30 tablet, Rfl: 0    ibuprofen (MOTRIN) 800 mg tablet, Take 1 tablet (800 mg total) by mouth 2 (two) times a day for 10 days, Disp: 20 tablet, Rfl: 0    Lancets (ONETOUCH ULTRASOFT) lancets, Check BS once daily  One touch, Dx- 11 99, Disp: , Rfl:     loratadine (CLARITIN) 10 mg tablet, TAKE 1 TABLET (10 MG TOTAL) BY MOUTH DAILY  , Disp: , Rfl:     lubiprostone (AMITIZA) 24 mcg capsule, Take 1 capsule (24 mcg total) by mouth 2 (two) times a day with meals, Disp: 180 capsule, Rfl: 3    metFORMIN (GLUCOPHAGE) 500 mg tablet, TAKE 1 TABLET (500 MG TOTAL) BY MOUTH 2 (TWO) TIMES A DAY WITH MEALS , Disp: , Rfl:     montelukast (SINGULAIR) 10 mg tablet, Take 10 mg by mouth daily at bedtime, Disp: , Rfl:     omeprazole (PRILOSEC) 20 mg delayed release capsule, Take 20 mg by mouth, Disp: , Rfl:     ondansetron (ZOFRAN-ODT) 4 mg disintegrating tablet, Take 1 tablet (4 mg total) by mouth every 8 (eight) hours as needed for nausea or vomiting, Disp: 10 tablet, Rfl: 0    ONETOUCH DELICA LANCETS FINE MISC, USE AS DIRECTED DAILY, Disp: , Rfl:       Active Problems     Patient Active Problem List   Diagnosis    Asthma    Morbid obesity ([facility] [address] )    Perinephric abscess    Nephrolithiasis    Sepsis secondary to UTI-perinephric abscess    Severe sepsis [facility])         Past Medical History     Past Medical History:   Diagnosis Date    Asthma     Diabetes mellitus ([facility] [address] )     Hypertension     Nephrolithiasis     Nerve damage     feet, legs, hands Surgical History     Past Surgical History:   Procedure Laterality Date    DENTAL SURGERY      CT CYSTO/URETERO W/LITHOTRIPSY &INDWELL STENT INSRT Left 7/26/2017    Procedure: CYSTOSCOPY URETEROSCOPY WITH LITHOTRIPSY HOLMIUM LASER, RETROGRADE PYELOGRAM AND INSERTION STENT URETERAL;  Surgeon: Kareem Kc MD;  Location: [facility] OR;  Service: Urology         Family History     Family History   Problem Relation Age of Onset    Diabetes Father     Hypertension Father     Diabetes Maternal Grandfather     Thyroid disease Mother          Social History     Social History       Radiology

## 2020-02-19 NOTE — TELEPHONE ENCOUNTER
I called pt again on his contact number      Voicemail not set up - could not leave message    Message already has been left with his mother for him to call back and schedule appt

## 2020-03-03 ENCOUNTER — OFFICE VISIT (OUTPATIENT)
Dept: GASTROENTEROLOGY | Facility: CLINIC | Age: 35
End: 2020-03-03
Payer: COMMERCIAL

## 2020-03-03 VITALS
HEART RATE: 86 BPM | WEIGHT: 293.38 LBS | BODY MASS INDEX: 36.48 KG/M2 | SYSTOLIC BLOOD PRESSURE: 118 MMHG | HEIGHT: 75 IN | DIASTOLIC BLOOD PRESSURE: 82 MMHG

## 2020-03-03 DIAGNOSIS — R10.9 ABDOMINAL PAIN: ICD-10-CM

## 2020-03-03 DIAGNOSIS — K59.04 CHRONIC IDIOPATHIC CONSTIPATION: ICD-10-CM

## 2020-03-03 DIAGNOSIS — R10.30 LOWER ABDOMINAL PAIN: Primary | ICD-10-CM

## 2020-03-03 DIAGNOSIS — K58.1 IRRITABLE BOWEL SYNDROME WITH CONSTIPATION: ICD-10-CM

## 2020-03-03 PROCEDURE — 99214 OFFICE O/P EST MOD 30 MIN: CPT | Performed by: INTERNAL MEDICINE

## 2020-03-03 RX ORDER — DICYCLOMINE HCL 20 MG
20 TABLET ORAL 3 TIMES DAILY PRN
Qty: 60 TABLET | Refills: 0 | Status: SHIPPED | OUTPATIENT
Start: 2020-03-03 | End: 2020-03-03 | Stop reason: SDUPTHER

## 2020-03-03 RX ORDER — DICYCLOMINE HCL 20 MG
20 TABLET ORAL EVERY 6 HOURS
Qty: 90 TABLET | Refills: 3 | Status: SHIPPED | OUTPATIENT
Start: 2020-03-03

## 2020-03-03 RX ORDER — DICYCLOMINE HCL 20 MG
20 TABLET ORAL EVERY 6 HOURS
Qty: 90 TABLET | Refills: 3 | Status: SHIPPED | OUTPATIENT
Start: 2020-03-03 | End: 2020-03-03 | Stop reason: SDUPTHER

## 2020-03-03 RX ORDER — DICYCLOMINE HCL 20 MG
20 TABLET ORAL 3 TIMES DAILY PRN
Qty: 60 TABLET | Refills: 0 | Status: SHIPPED | OUTPATIENT
Start: 2020-03-03

## 2020-03-03 NOTE — PROGRESS NOTES
Follow-up Note -  [facility]  Radhika Glez 1985 37 y o  male     ASSESSMENT @ PLAN:   He is a  30-year-old male with bilateral lower quadrant abdominal cramping slows stool incomplete evacuation hardness of stools and straining diagnostic for irritable bowel syndrome constipation predominant who is here after failing MiraLax Dulcolax and Amitiza  He had a normal colonoscopy with Dr Kaykay Velazquez in 2019  Will give Linzess 290 micro g daily    Will give dicyclomine as needed for relief of abdominal pain    I told him to use less Dulcolax and this can be used as an emergency strategy but over time he should be using less    Reason:   Abdominal pain and constipation    HPI:   He is a  30-year-old male with irritable bowel syndrome constipation predominant who is here for follow-up  He was seen in 2018 with similar symptoms and had a colonoscopy this is that was normal   He was prescribed Amitiza and he reports that it did not work  He reports that he will not have a bowel movement unless he takes milk of magnesia and 6 Dulcolax daily  He has no fevers chills no nausea vomiting no weight loss  He does have obesity with a BMI of 36 6  He has lifelong constipation  He had similar episode recently where he had severe constipation with horrible bilateral lower quadrant abdominal pain that radiated to the back and he had to go to the emergency room  He reports that he has failed MiraLax and Dulcolax and milk of magnesia and Amitiza  He has incomplete evacuation straining and hardness of stools  He has no melena hematochezia    REVIEW OF SYSTEMS:     CONSTITUTIONAL: Denies any fever, chills, or rigors  Good appetite, and no recent weight loss  HEENT: No earache or tinnitus  Denies hearing loss or visual disturbances  CARDIOVASCULAR: No chest pain or palpitations  RESPIRATORY: Denies any cough, hemoptysis, shortness of breath or dyspnea on exertion    GASTROINTESTINAL: As noted in the History of Present Illness  GENITOURINARY: No problems with urination  Denies any hematuria or dysuria  NEUROLOGIC: No dizziness or vertigo, denies headaches  MUSCULOSKELETAL: Denies any muscle or joint pain  SKIN: Denies skin rashes or itching  ENDOCRINE: Denies excessive thirst  Denies intolerance to heat or cold  PSYCHOSOCIAL: Denies depression or anxiety  Denies any recent memory loss       Past Medical History:   Diagnosis Date    Asthma     Diabetes mellitus ([address] )     Hypertension     Nephrolithiasis     Nerve damage     feet, legs, hands      Past Surgical History:   Procedure Laterality Date    DENTAL SURGERY      [facility] CYSTO/URETERO W/LITHOTRIPSY &INDWELL STENT INSRT Left 7/26/2017    Procedure: CYSTOSCOPY URETEROSCOPY WITH LITHOTRIPSY HOLMIUM LASER, RETROGRADE PYELOGRAM AND INSERTION STENT URETERAL;  Surgeon: Adrian Hurtado MD;  Location: [facility];  Service: Urology     Social History     Socioeconomic History    Marital status: Single     Spouse name: Not on file    Number of children: Not on file    Years of education: Not on file    Highest education level: Not on file   Occupational History    Occupation:    Social Needs    Financial resource strain: Not on file    Food insecurity:     Worry: Not on file     Inability: Not on file    Transportation needs:     Medical: Not on file     Non-medical: Not on file   Tobacco Use    Smoking status: Never Smoker    Smokeless tobacco: Never Used   Substance and Sexual Activity    Alcohol use: No    Drug use: No    Sexual activity: Yes     Partners: Female     Birth control/protection: None   Lifestyle    Physical activity:     Days per week: Not on file     Minutes per session: Not on file    Stress: Not on file   Relationships    Social connections:     Talks on phone: Not on file     Gets together: Not on file     Attends Scientologist service: Not on file     Active member of club or organization: Not on file     Attends meetings of clubs or organizations: Not on file     Relationship status: Not on file    Intimate partner violence:     Fear of current or ex partner: Not on file     Emotionally abused: Not on file     Physically abused: Not on file     Forced sexual activity: Not on file   Other Topics Concern    Not on file   Social History Narrative    Not on file     Family History   Problem Relation Age of Onset    Diabetes Father     Hypertension Father     Diabetes Maternal Grandfather     Thyroid disease Mother      Amoxicillin and Penicillins  Current Outpatient Medications   Medication Sig Dispense Refill    albuterol (2 5 mg/3 mL) 0 083 % nebulizer solution Take 3 mL (2 5 mg total) by nebulization every 6 (six) hours as needed for wheezing 75 mL 0    albuterol (PROVENTIL HFA,VENTOLIN HFA) 90 mcg/act inhaler Inhale 2 puffs every 6 (six) hours as needed for wheezing      ALBUTEROL IN Inhale 2 puffs      gabapentin (NEURONTIN) 300 mg capsule Take 300 mg by mouth      glucose blood test strip 1 STRIP BY MISCELLANEOUS ROUTE DAILY  CHECK BS ONCE DAILY  ONE TOUCH, DX- 11 99      Lancets (ONETOUCH ULTRASOFT) lancets Check BS once daily  One touch, Dx- 11 99      loratadine (CLARITIN) 10 mg tablet TAKE 1 TABLET (10 MG TOTAL) BY MOUTH DAILY   metFORMIN (GLUCOPHAGE) 500 mg tablet TAKE 1 TABLET (500 MG TOTAL) BY MOUTH 2 (TWO) TIMES A DAY WITH MEALS   omeprazole (PRILOSEC) 20 mg delayed release capsule Take 20 mg by mouth      ONETOUCH DELICA LANCETS FINE MISC USE AS DIRECTED DAILY      Blood Glucose Monitoring Suppl (ACURA BLOOD GLUCOSE METER) w/Device KIT Check BS once daily    One touch, Dx- 11 99      ciprofloxacin (CIPRO) 500 mg tablet Take by mouth      cyclobenzaprine (FLEXERIL) 10 mg tablet Take 1 tablet (10 mg total) by mouth 3 (three) times a day as needed for muscle spasms for up to 10 doses Do NOT drive while taking this medicine (Patient not taking: Reported on 3/3/2020) 10 tablet 0    dicyclomine (BENTYL) 20 mg tablet Take 1 tablet (20 mg total) by mouth every 6 (six) hours 90 tablet 3    dicyclomine (BENTYL) 20 mg tablet Take 1 tablet (20 mg total) by mouth 3 (three) times a day as needed (abdominal pain) 60 tablet 0    hydrocortisone 2 5 % cream Apply topically      ibuprofen (MOTRIN) 400 mg tablet Take 1 tablet (400 mg total) by mouth every 6 (six) hours as needed for moderate pain (foot fracture/intial rx ) for up to 5 days 30 tablet 0    ibuprofen (MOTRIN) 800 mg tablet Take 1 tablet (800 mg total) by mouth 2 (two) times a day for 10 days 20 tablet 0    linaCLOtide (Linzess) 290 MCG CAPS Take 1 capsule by mouth daily 90 capsule 2    montelukast (SINGULAIR) 10 mg tablet Take 10 mg by mouth daily at bedtime      ondansetron (ZOFRAN-ODT) 4 mg disintegrating tablet Take 1 tablet (4 mg total) by mouth every 8 (eight) hours as needed for nausea or vomiting (Patient not taking: Reported on 3/3/2020) 10 tablet 0     No current facility-administered medications for this visit  Blood pressure 118/82, pulse 86, height 6' 3" (1 905 m), weight 133 kg (293 lb 6 oz)  PHYSICAL EXAM:     General Appearance:   Alert, cooperative, no distress, appears stated age    HEENT:   Normocephalic, atraumatic, anicteric      Neck:  Supple, symmetrical, trachea midline, no adenopathy;    thyroid: no enlargement/tenderness/nodules; no carotid  bruit or JVD    Lungs:   Clear to auscultation bilaterally; no rales, rhonchi or wheezing; respirations unlabored    Heart[de-identified]   S1 and S2 normal; regular rate and rhythm; no murmur, rub, or gallop     Abdomen:   Soft, non-tender, non-distended; normal bowel sounds; no masses, no organomegaly    Genitalia:   Deferred    Rectal:   Deferred    Extremities:  No cyanosis, clubbing or edema    Pulses:  2+ and symmetric all extremities    Skin:  Skin color, texture, turgor normal, no rashes or lesions    Lymph nodes:  No palpable cervical, axillary or inguinal lymphadenopathy        Lab Results   Component Value Date    WBC 9 25 02/14/2020    HGB 14 6 02/14/2020    HCT 44 7 02/14/2020    MCV 86 02/14/2020     02/14/2020     Lab Results   Component Value Date    CALCIUM 9 2 02/14/2020    K 4 5 02/14/2020    CO2 32 02/14/2020     02/14/2020    BUN 11 02/14/2020    CREATININE 0 80 02/14/2020     Lab Results   Component Value Date    ALT 36 02/14/2020    AST 21 02/14/2020    ALKPHOS 52 02/14/2020     Lab Results   Component Value Date    INR 1 13 08/18/2017    INR 1 03 08/17/2017    INR 1 18 (H) 08/05/2017    PROTIME 14 5 (H) 08/18/2017    PROTIME 13 5 08/17/2017    PROTIME 15 1 (H) 08/05/2017

## 2020-03-03 NOTE — TELEPHONE ENCOUNTER
Patient would like medication sent to [facility] on [address]   Please call [phone]

## 2020-03-26 ENCOUNTER — TELEPHONE (OUTPATIENT)
Dept: GASTROENTEROLOGY | Facility: CLINIC | Age: 35
End: 2020-03-26

## 2020-03-26 NOTE — TELEPHONE ENCOUNTER
We can give him a note stating that due to underlying medical conditions, the patient may need to be excused from work 1-2 times per month  We also should offer him a virtual visit in the next few weeks to see if there's anything else we can do to make his symptoms better  This can be with Dr Kandi Maxwell, me, or Bassem Menon

## 2020-03-26 NOTE — TELEPHONE ENCOUNTER
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

## 2020-03-26 NOTE — TELEPHONE ENCOUNTER
Tried calling pt, voice mailbox not set up  Called [facility] for pt to call office    Created letter and priinted

## 2020-03-26 NOTE — LETTER
[id number]  [place] [address]  [place] [address]  [phone]  Dept: [phone]    March 26, 2020     Patient: Mellie Kayser   YOB: 1985   Date of Visit: 3/26/2020       To Whom it May Concern:    Kenia Fritz is under my professional care  Due to underlying medical conditions, Mr Rod Leary may need to be excused from work 1-2 times per month  If you have any questions or concerns, please don't hesitate to call           Sincerely,          Clint YungsUBALDO

## 2020-04-01 ENCOUNTER — TELEPHONE (OUTPATIENT)
Dept: GASTROENTEROLOGY | Facility: CLINIC | Age: 35
End: 2020-04-01

## 2020-06-15 ENCOUNTER — HOSPITAL ENCOUNTER (EMERGENCY)
Facility: HOSPITAL | Age: 35
Discharge: HOME | End: 2020-06-15
Attending: EMERGENCY MEDICINE
Payer: COMMERCIAL

## 2020-06-15 ENCOUNTER — APPOINTMENT (EMERGENCY)
Dept: CT IMAGING | Facility: HOSPITAL | Age: 35
End: 2020-06-15
Payer: COMMERCIAL

## 2020-06-15 VITALS
TEMPERATURE: 98.5 F | HEART RATE: 67 BPM | SYSTOLIC BLOOD PRESSURE: 119 MMHG | RESPIRATION RATE: 29 BRPM | BODY MASS INDEX: 35.96 KG/M2 | DIASTOLIC BLOOD PRESSURE: 64 MMHG | WEIGHT: 287.7 LBS | OXYGEN SATURATION: 97 %

## 2020-06-15 DIAGNOSIS — K29.70 GASTRITIS: Primary | ICD-10-CM

## 2020-06-15 LAB
ALBUMIN SERPL BCP-MCNC: 3.7 G/DL (ref 3.5–5)
ALP SERPL-CCNC: 55 U/L (ref 46–116)
ALT SERPL W P-5'-P-CCNC: 39 U/L (ref 12–78)
ANION GAP SERPL CALCULATED.3IONS-SCNC: 6 MMOL/L (ref 4–13)
AST SERPL W P-5'-P-CCNC: 36 U/L (ref 5–45)
BASOPHILS # BLD AUTO: 0.06 THOUSANDS/ΜL (ref 0–0.1)
BASOPHILS NFR BLD AUTO: 1 % (ref 0–1)
BILIRUB SERPL-MCNC: 0.4 MG/DL (ref 0.2–1)
BUN SERPL-MCNC: 11 MG/DL (ref 5–25)
CALCIUM SERPL-MCNC: 8.6 MG/DL (ref 8.3–10.1)
CHLORIDE SERPL-SCNC: 103 MMOL/L (ref 100–108)
CO2 SERPL-SCNC: 32 MMOL/L (ref 21–32)
CREAT SERPL-MCNC: 0.84 MG/DL (ref 0.6–1.3)
EOSINOPHIL # BLD AUTO: 0.31 THOUSAND/ΜL (ref 0–0.61)
EOSINOPHIL NFR BLD AUTO: 4 % (ref 0–6)
ERYTHROCYTE [DISTWIDTH] IN BLOOD BY AUTOMATED COUNT: 12.6 % (ref 11.6–15.1)
GFR SERPL CREATININE-BSD FRML MDRD: 115 ML/MIN/1.73SQ M
GLUCOSE SERPL-MCNC: 107 MG/DL (ref 65–140)
HCT VFR BLD AUTO: 42.4 % (ref 36.5–49.3)
HGB BLD-MCNC: 13.8 G/DL (ref 12–17)
IMM GRANULOCYTES # BLD AUTO: 0.03 THOUSAND/UL (ref 0–0.2)
IMM GRANULOCYTES NFR BLD AUTO: 0 % (ref 0–2)
LIPASE SERPL-CCNC: 121 U/L (ref 73–393)
LYMPHOCYTES # BLD AUTO: 2.67 THOUSANDS/ΜL (ref 0.6–4.47)
LYMPHOCYTES NFR BLD AUTO: 36 % (ref 14–44)
MCH RBC QN AUTO: 27.5 PG (ref 26.8–34.3)
MCHC RBC AUTO-ENTMCNC: 32.5 G/DL (ref 31.4–37.4)
MCV RBC AUTO: 85 FL (ref 82–98)
MONOCYTES # BLD AUTO: 0.84 THOUSAND/ΜL (ref 0.17–1.22)
MONOCYTES NFR BLD AUTO: 11 % (ref 4–12)
NEUTROPHILS # BLD AUTO: 3.57 THOUSANDS/ΜL (ref 1.85–7.62)
NEUTS SEG NFR BLD AUTO: 48 % (ref 43–75)
NRBC BLD AUTO-RTO: 0 /100 WBCS
PLATELET # BLD AUTO: 353 THOUSANDS/UL (ref 149–390)
PMV BLD AUTO: 10 FL (ref 8.9–12.7)
POTASSIUM SERPL-SCNC: 4 MMOL/L (ref 3.5–5.3)
PROT SERPL-MCNC: 7.8 G/DL (ref 6.4–8.2)
RBC # BLD AUTO: 5.01 MILLION/UL (ref 3.88–5.62)
SODIUM SERPL-SCNC: 141 MMOL/L (ref 136–145)
TROPONIN I SERPL-MCNC: <0.02 NG/ML
TROPONIN I SERPL-MCNC: <0.02 NG/ML
WBC # BLD AUTO: 7.48 THOUSAND/UL (ref 4.31–10.16)

## 2020-06-15 PROCEDURE — 85025 COMPLETE CBC W/AUTO DIFF WBC: CPT | Performed by: EMERGENCY MEDICINE

## 2020-06-15 PROCEDURE — 96361 HYDRATE IV INFUSION ADD-ON: CPT

## 2020-06-15 PROCEDURE — 36415 COLL VENOUS BLD VENIPUNCTURE: CPT | Performed by: EMERGENCY MEDICINE

## 2020-06-15 PROCEDURE — 99284 EMERGENCY DEPT VISIT MOD MDM: CPT | Performed by: EMERGENCY MEDICINE

## 2020-06-15 PROCEDURE — 99285 EMERGENCY DEPT VISIT HI MDM: CPT

## 2020-06-15 PROCEDURE — 96374 THER/PROPH/DIAG INJ IV PUSH: CPT

## 2020-06-15 PROCEDURE — 74177 CT ABD & PELVIS W/CONTRAST: CPT

## 2020-06-15 PROCEDURE — 83690 ASSAY OF LIPASE: CPT | Performed by: EMERGENCY MEDICINE

## 2020-06-15 PROCEDURE — 84484 ASSAY OF TROPONIN QUANT: CPT | Performed by: EMERGENCY MEDICINE

## 2020-06-15 PROCEDURE — 80053 COMPREHEN METABOLIC PANEL: CPT | Performed by: EMERGENCY MEDICINE

## 2020-06-15 PROCEDURE — 93005 ELECTROCARDIOGRAM TRACING: CPT

## 2020-06-15 RX ORDER — ONDANSETRON 2 MG/ML
4 INJECTION INTRAMUSCULAR; INTRAVENOUS ONCE
Status: COMPLETED | OUTPATIENT
Start: 2020-06-15 | End: 2020-06-15

## 2020-06-15 RX ORDER — MAGNESIUM HYDROXIDE/ALUMINUM HYDROXICE/SIMETHICONE 120; 1200; 1200 MG/30ML; MG/30ML; MG/30ML
30 SUSPENSION ORAL ONCE
Status: COMPLETED | OUTPATIENT
Start: 2020-06-15 | End: 2020-06-15

## 2020-06-15 RX ORDER — SUCRALFATE ORAL 1 G/10ML
1 SUSPENSION ORAL 4 TIMES DAILY
Qty: 420 ML | Refills: 0 | Status: SHIPPED | OUTPATIENT
Start: 2020-06-15

## 2020-06-15 RX ORDER — SUCRALFATE ORAL 1 G/10ML
1000 SUSPENSION ORAL ONCE
Status: COMPLETED | OUTPATIENT
Start: 2020-06-15 | End: 2020-06-15

## 2020-06-15 RX ORDER — DICYCLOMINE HCL 20 MG
20 TABLET ORAL ONCE
Status: COMPLETED | OUTPATIENT
Start: 2020-06-15 | End: 2020-06-15

## 2020-06-15 RX ORDER — LIDOCAINE HYDROCHLORIDE 20 MG/ML
15 SOLUTION OROPHARYNGEAL ONCE
Status: COMPLETED | OUTPATIENT
Start: 2020-06-15 | End: 2020-06-15

## 2020-06-15 RX ORDER — OMEPRAZOLE 20 MG/1
20 CAPSULE, DELAYED RELEASE ORAL DAILY
Qty: 20 CAPSULE | Refills: 0 | Status: SHIPPED | OUTPATIENT
Start: 2020-06-15

## 2020-06-15 RX ADMIN — SUCRALFATE 1000 MG: 1 SUSPENSION ORAL at 10:20

## 2020-06-15 RX ADMIN — DICYCLOMINE HYDROCHLORIDE 20 MG: 20 TABLET ORAL at 10:19

## 2020-06-15 RX ADMIN — IOHEXOL 100 ML: 350 INJECTION, SOLUTION INTRAVENOUS at 11:22

## 2020-06-15 RX ADMIN — ALUMINUM HYDROXIDE, MAGNESIUM HYDROXIDE, AND SIMETHICONE 30 ML: 200; 200; 20 SUSPENSION ORAL at 10:21

## 2020-06-15 RX ADMIN — SODIUM CHLORIDE 1000 ML: 0.9 INJECTION, SOLUTION INTRAVENOUS at 10:18

## 2020-06-15 RX ADMIN — ONDANSETRON 4 MG: 2 INJECTION INTRAMUSCULAR; INTRAVENOUS at 10:19

## 2020-06-15 RX ADMIN — LIDOCAINE HYDROCHLORIDE 15 ML: 20 SOLUTION ORAL; TOPICAL at 10:22

## 2020-06-16 LAB
ATRIAL RATE: 61 BPM
ATRIAL RATE: 74 BPM
P AXIS: 54 DEGREES
P AXIS: 58 DEGREES
PR INTERVAL: 170 MS
PR INTERVAL: 182 MS
QRS AXIS: 40 DEGREES
QRS AXIS: 41 DEGREES
QRSD INTERVAL: 86 MS
QRSD INTERVAL: 90 MS
QT INTERVAL: 380 MS
QT INTERVAL: 404 MS
QTC INTERVAL: 406 MS
QTC INTERVAL: 421 MS
T WAVE AXIS: 52 DEGREES
T WAVE AXIS: 56 DEGREES
VENTRICULAR RATE: 61 BPM
VENTRICULAR RATE: 74 BPM

## 2020-06-16 PROCEDURE — 93010 ELECTROCARDIOGRAM REPORT: CPT | Performed by: INTERNAL MEDICINE

## 2020-07-07 ENCOUNTER — TELEPHONE (OUTPATIENT)
Dept: GASTROENTEROLOGY | Facility: CLINIC | Age: 35
End: 2020-07-07

## 2020-07-07 DIAGNOSIS — K59.09 CHRONIC CONSTIPATION: Primary | ICD-10-CM

## 2020-07-07 NOTE — TELEPHONE ENCOUNTER
Spoke with patient  History of abdominal pain and constipation    Patient c/o continued constipation  He stopped taking linzess 290mcg 3 weeks ago  He continues to have abdomina bloating and pain throughout his abdomen and back  He is taking milk of magnesia 400-800mg a day, and ducolax 7 tablets a day  His last BM was yesterday  Encouraged patient to drink more fluids and eat more fruits and vegetables  He is scheduled for a follow-up next week  Any other suggestions?

## 2020-07-07 NOTE — TELEPHONE ENCOUNTER
Dr Stephon Martinez - patient called - severe back pain, not able to go to bathroom  OV 07/14/20  Patient is taking milk of Magnesium   Please call [facility] Henrik at [phone] ty

## 2020-07-07 NOTE — TELEPHONE ENCOUNTER
Pt returned call, he would like the RX sent to [facility] in [facility]   Ty

## 2020-07-08 NOTE — TELEPHONE ENCOUNTER
[facility] [employer] fax pt's insurance for [employer] termed out      Filled out [employer] prior auth form and faxed to [phone]

## 2020-07-08 NOTE — TELEPHONE ENCOUNTER
[facility] card in chart

## 2020-07-10 NOTE — TELEPHONE ENCOUNTER
Resending prior auth with diagnosis of chronic idiopathic constipation as per discussion with Zahra Beltrán

## 2020-07-10 NOTE — TELEPHONE ENCOUNTER
rcvd fax   motegrity denied     Diagnosis submitted is not an FDA approved indication for the requested product    Used idanosis of IBS with constipation     Routing to pa  What do you suggest?  Thank you

## 2020-07-11 ENCOUNTER — HOSPITAL ENCOUNTER (EMERGENCY)
Facility: HOSPITAL | Age: 35
Discharge: HOME | End: 2020-07-11
Attending: EMERGENCY MEDICINE
Payer: COMMERCIAL

## 2020-07-11 VITALS
RESPIRATION RATE: 18 BRPM | BODY MASS INDEX: 33.57 KG/M2 | OXYGEN SATURATION: 96 % | SYSTOLIC BLOOD PRESSURE: 121 MMHG | HEART RATE: 82 BPM | WEIGHT: 270 LBS | HEIGHT: 75 IN | TEMPERATURE: 98.5 F | DIASTOLIC BLOOD PRESSURE: 75 MMHG

## 2020-07-11 DIAGNOSIS — M54.50 ACUTE LOW BACK PAIN: Primary | ICD-10-CM

## 2020-07-11 LAB
BILIRUB UR QL STRIP: NEGATIVE
CLARITY UR: CLEAR
COLOR UR: YELLOW
GLUCOSE UR STRIP-MCNC: NEGATIVE MG/DL
HGB UR QL STRIP.AUTO: NEGATIVE
KETONES UR STRIP-MCNC: NEGATIVE MG/DL
LEUKOCYTE ESTERASE UR QL STRIP: NEGATIVE
NITRITE UR QL STRIP: NEGATIVE
PH UR STRIP.AUTO: 6 [PH]
PROT UR STRIP-MCNC: NEGATIVE MG/DL
SP GR UR STRIP.AUTO: >=1.03 (ref 1–1.03)
UROBILINOGEN UR QL STRIP.AUTO: 0.2 E.U./DL

## 2020-07-11 PROCEDURE — 99283 EMERGENCY DEPT VISIT LOW MDM: CPT

## 2020-07-11 PROCEDURE — 99284 EMERGENCY DEPT VISIT MOD MDM: CPT | Performed by: PHYSICIAN ASSISTANT

## 2020-07-11 PROCEDURE — 96372 THER/PROPH/DIAG INJ SC/IM: CPT

## 2020-07-11 PROCEDURE — 81003 URINALYSIS AUTO W/O SCOPE: CPT | Performed by: PHYSICIAN ASSISTANT

## 2020-07-11 RX ORDER — CYCLOBENZAPRINE HCL 10 MG
10 TABLET ORAL 3 TIMES DAILY PRN
Qty: 15 TABLET | Refills: 0 | Status: SHIPPED | OUTPATIENT
Start: 2020-07-11

## 2020-07-11 RX ORDER — KETOROLAC TROMETHAMINE 30 MG/ML
15 INJECTION, SOLUTION INTRAMUSCULAR; INTRAVENOUS ONCE
Status: COMPLETED | OUTPATIENT
Start: 2020-07-11 | End: 2020-07-11

## 2020-07-11 RX ADMIN — KETOROLAC TROMETHAMINE 15 MG: 30 INJECTION, SOLUTION INTRAMUSCULAR at 17:53

## 2020-07-11 NOTE — ED PROVIDER NOTES
History  Chief Complaint   Patient presents with    Back Pain     Patient c/o lower back pain that he states he woke up with this morning  Patient states the pain is radiating down both legs  Patients denies any acute injury  36 yo with low back pain  He reports having pain for 3 weeks in this area but this morning woke up and now has pain radiating to bilateral legs  He regularly performs heavy manual labor  No recent injuries he recalls  Denies saddle anesthesia, urinary incontinence/retention, fever, chills, n/v  No abdominal pain  No prior surgeries to his back  No difficulty ambulating or extremity weakness  Has h/o kidney stones but reports this feels different  History provided by:  Patient   used: No    Back Pain   Location:  Lumbar spine  Quality:  Aching  Radiates to:  Does not radiate  Pain severity:  Mild  Pain is:  Same all the time  Onset quality:  Gradual  Duration:  3 weeks  Timing:  Constant  Progression:  Unchanged  Chronicity:  New  Context: lifting heavy objects    Context: not emotional stress, not falling, not jumping from heights, not MCA, not MVA, not occupational injury, not pedestrian accident, not physical stress, not recent illness, not recent injury and not twisting    Relieved by:  Nothing  Worsened by: Movement  Ineffective treatments:  None tried  Associated symptoms: no abdominal pain, no abdominal swelling, no bowel incontinence, no chest pain, no dysuria, no fever, no headaches, no numbness and no weakness        Prior to Admission Medications   Prescriptions Last Dose Informant Patient Reported? Taking? ALBUTEROL IN  Self Yes No   Sig: Inhale 2 puffs   Blood Glucose Monitoring Suppl (ACURA BLOOD GLUCOSE METER) w/Device KIT  Self Yes No   Sig: Check BS once daily  One touch, Dx- 11 99   Lancets (ONETOUCH ULTRASOFT) lancets  Self Yes No   Sig: Check BS once daily    One touch, Dx- 85 22   ONETOUCH DELICA LANCETS FINE MISC  Self Yes No   Sig: USE AS DIRECTED DAILY   Prucalopride Succinate 2 MG TABS   No No   Sig: Take 1 tablet by mouth daily   albuterol (2 5 mg/3 mL) 0 083 % nebulizer solution  Self No No   Sig: Take 3 mL (2 5 mg total) by nebulization every 6 (six) hours as needed for wheezing   albuterol (PROVENTIL HFA,VENTOLIN HFA) 90 mcg/act inhaler  Self Yes No   Sig: Inhale 2 puffs every 6 (six) hours as needed for wheezing   ciprofloxacin (CIPRO) 500 mg tablet  Self Yes No   Sig: Take by mouth   cyclobenzaprine (FLEXERIL) 10 mg tablet   No No   Sig: Take 1 tablet (10 mg total) by mouth 3 (three) times a day as needed for muscle spasms for up to 10 doses Do NOT drive while taking this medicine   Patient not taking: Reported on 3/3/2020   dicyclomine (BENTYL) 20 mg tablet   No No   Sig: Take 1 tablet (20 mg total) by mouth every 6 (six) hours   dicyclomine (BENTYL) 20 mg tablet   No No   Sig: Take 1 tablet (20 mg total) by mouth 3 (three) times a day as needed (abdominal pain)   gabapentin (NEURONTIN) 300 mg capsule  Self Yes No   Sig: Take 300 mg by mouth   glucose blood test strip  Self Yes No   Si STRIP BY MISCELLANEOUS ROUTE DAILY  CHECK BS ONCE DAILY  ONE TOUCH, DX- 11 99   hydrocortisone 2 5 % cream  Self Yes No   Sig: Apply topically   ibuprofen (MOTRIN) 400 mg tablet   No No   Sig: Take 1 tablet (400 mg total) by mouth every 6 (six) hours as needed for moderate pain (foot fracture/intial rx ) for up to 5 days   ibuprofen (MOTRIN) 800 mg tablet   No No   Sig: Take 1 tablet (800 mg total) by mouth 2 (two) times a day for 10 days   loratadine (CLARITIN) 10 mg tablet  Self Yes No   Sig: TAKE 1 TABLET (10 MG TOTAL) BY MOUTH DAILY  metFORMIN (GLUCOPHAGE) 500 mg tablet  Self Yes No   Sig: TAKE 1 TABLET (500 MG TOTAL) BY MOUTH 2 (TWO) TIMES A DAY WITH MEALS     montelukast (SINGULAIR) 10 mg tablet  Self Yes No   Sig: Take 10 mg by mouth daily at bedtime   omeprazole (PRILOSEC) 20 mg delayed release capsule  Self Yes No   Sig: Take 20 mg by mouth omeprazole (PriLOSEC) 20 mg delayed release capsule   No No   Sig: Take 1 capsule (20 mg total) by mouth daily   ondansetron (ZOFRAN-ODT) 4 mg disintegrating tablet  Self No No   Sig: Take 1 tablet (4 mg total) by mouth every 8 (eight) hours as needed for nausea or vomiting   Patient not taking: Reported on 3/3/2020   sucralfate (CARAFATE) 1 g/10 mL suspension   No No   Sig: Take 10 mL (1 g total) by mouth 4 (four) times a day      Facility-Administered Medications: None       Past Medical History:   Diagnosis Date    Asthma     Diabetes mellitus ([address] )     Hypertension     IBS (irritable bowel syndrome)     Nephrolithiasis     Nerve damage     feet, legs, hands       Past Surgical History:   Procedure Laterality Date    DENTAL SURGERY      MD CYSTO/URETERO W/LITHOTRIPSY &INDWELL STENT INSRT Left 7/26/2017    Procedure: CYSTOSCOPY URETEROSCOPY WITH LITHOTRIPSY HOLMIUM LASER, RETROGRADE PYELOGRAM AND INSERTION STENT URETERAL;  Surgeon: Enrike Grove MD;  Location: [facility];  Service: Urology       Family History   Problem Relation Age of Onset    Diabetes Father     Hypertension Father     Diabetes Maternal Grandfather     Thyroid disease Mother      I have reviewed and agree with the history as documented  E-Cigarette/Vaping    E-Cigarette Use Never User      E-Cigarette/Vaping Substances    Nicotine No     THC No     CBD No     Flavoring No      Social History     Tobacco Use    Smoking status: Never Smoker    Smokeless tobacco: Never Used   Substance Use Topics    Alcohol use: No    Drug use: No       Review of Systems   Constitutional: Negative for activity change, appetite change, chills, diaphoresis, fatigue, fever and unexpected weight change  HENT: Negative for congestion, rhinorrhea, sinus pressure, sore throat and trouble swallowing  Eyes: Negative for photophobia and visual disturbance     Respiratory: Negative for apnea, cough, choking, chest tightness, shortness of breath, wheezing and stridor  Cardiovascular: Negative for chest pain, palpitations and leg swelling  Gastrointestinal: Negative for abdominal distention, abdominal pain, blood in stool, bowel incontinence, constipation, diarrhea, nausea and vomiting  Genitourinary: Negative for decreased urine volume, difficulty urinating, dysuria, enuresis, flank pain, frequency, hematuria and urgency  Musculoskeletal: Positive for back pain  Negative for arthralgias, myalgias, neck pain and neck stiffness  Skin: Negative for color change, pallor, rash and wound  Allergic/Immunologic: Negative  Neurological: Negative for dizziness, tremors, syncope, weakness, light-headedness, numbness and headaches  Hematological: Negative  Psychiatric/Behavioral: Negative  All other systems reviewed and are negative  Physical Exam  Physical Exam   Constitutional: He is oriented to person, place, and time  He appears well-developed and well-nourished  Non-toxic appearance  He does not have a sickly appearance  He does not appear ill  No distress  HENT:   Head: Normocephalic and atraumatic  Eyes: Pupils are equal, round, and reactive to light  EOM and lids are normal    Neck: Normal range of motion  Neck supple  Cardiovascular: Normal rate, regular rhythm, S1 normal, S2 normal, normal heart sounds, intact distal pulses and normal pulses  Exam reveals no gallop, no distant heart sounds, no friction rub and no decreased pulses  No murmur heard  Pulses:       Radial pulses are 2+ on the right side, and 2+ on the left side  Pulmonary/Chest: Effort normal and breath sounds normal  No accessory muscle usage  No apnea, no tachypnea and no bradypnea  No respiratory distress  He has no decreased breath sounds  He has no wheezes  He has no rhonchi  He has no rales  Abdominal: Soft  Normal appearance  He exhibits no distension  There is no tenderness  There is no rigidity, no rebound and no guarding  Musculoskeletal: Normal range of motion  He exhibits no edema or deformity  Thoracic back: Normal         Lumbar back: He exhibits tenderness and pain  Back:    Neurological: He is alert and oriented to person, place, and time  No cranial nerve deficit  GCS eye subscore is 4  GCS verbal subscore is 5  GCS motor subscore is 6  Skin: Skin is warm, dry and intact  No rash noted  He is not diaphoretic  No erythema  No pallor  Psychiatric: His speech is normal    Nursing note and vitals reviewed        Vital Signs  ED Triage Vitals   Temperature Pulse Respirations Blood Pressure SpO2   07/11/20 1756 07/11/20 1728 07/11/20 1728 07/11/20 1728 07/11/20 1728   98 5 °F (36 9 °C) 82 18 121/75 96 %      Temp Source Heart Rate Source Patient Position - Orthostatic VS BP Location FiO2 (%)   07/11/20 1756 07/11/20 1728 07/11/20 1728 07/11/20 1728 --   Oral Monitor Lying Right arm       Pain Score       07/11/20 1728       Worst Possible Pain           Vitals:    07/11/20 1728   BP: 121/75   Pulse: 82   Patient Position - Orthostatic VS: Lying         Visual Acuity      ED Medications  Medications   ketorolac (TORADOL) injection 15 mg (15 mg Intramuscular Given 7/11/20 1753)       Diagnostic Studies  Results Reviewed     Procedure Component Value Units Date/Time    UA w Reflex to Microscopic w Reflex to Culture [[id number]] Collected:  07/11/20 1754    Lab Status:  Final result Specimen:  Urine, Clean Catch Updated:  07/11/20 1804     Color, UA Yellow     Clarity, UA Clear     Specific Gravity, UA >=1 030     pH, UA 6 0     Leukocytes, UA Negative     Nitrite, UA Negative     Protein, UA Negative mg/dl      Glucose, UA Negative mg/dl      Ketones, UA Negative mg/dl      Urobilinogen, UA 0 2 E U /dl      Bilirubin, UA Negative     Blood, UA Negative                 No orders to display              Procedures  Procedures         ED Course       US AUDIT      Most Recent Value   Initial Alcohol Screen: US AUDIT-C 1  How often do you have a drink containing alcohol?  0 Filed at: 07/11/2020 1729   2  How many drinks containing alcohol do you have on a typical day you are drinking? 0 Filed at: 07/11/2020 1729   3a  Male UNDER 65: How often do you have five or more drinks on one occasion? 0 Filed at: 07/11/2020 1729   3b  FEMALE Any Age, or MALE 65+: How often do you have 4 or more drinks on one occassion? 0 Filed at: 07/11/2020 1729   Audit-C Score  0 Filed at: 07/11/2020 1729                  BELLA/DAST-10      Most Recent Value   How many times in the past year have you    Used an illegal drug or used a prescription medication for non-medical reasons? Never Filed at: 07/11/2020 1729                                MDM  Number of Diagnoses or Management Options  Acute low back pain: new and requires workup  Diagnosis management comments: DDx including but not limited to: sciatica, herniated disc, arthritis, spinal stenosis, strain, sprain  Plan: UA, low yield to xr/ct       Amount and/or Complexity of Data Reviewed  Tests in the radiology section of CPT®: ordered and reviewed  Independent visualization of images, tracings, or specimens: yes    Risk of Complications, Morbidity, and/or Mortality  Presenting problems: low  Management options: low  General comments: 38 yo with low back pain  Suspect radiculopathy  UA negative, doubt stone, uti  Recommended conservative management  Muscle relaxer for breakthrough pain  No red flag signs  No indication for stat MRI  This is not cauda equina  Return parameters provided  Pt understands and agrees with plan        Patient Progress  Patient progress: stable        Disposition  Final diagnoses:   Acute low back pain     Time reflects when diagnosis was documented in both MDM as applicable and the Disposition within this note     Time User Action Codes Description Comment    7/11/2020  6:12 PM Tamara Trujillo Add [M54 5] Acute low back pain       ED Disposition     ED Disposition Condition Date/Time Comment    Discharge Stable Sat Jul 11, 2020  6:12 PM [place] discharge to home/self care              Follow-up Information     Follow up With Specialties Details Why Contact Info Additional Information    [address] [facility] Physical Therapy Call   [phone]    Michel Elise MD Family Medicine   [address] [address]  [phone]             Patient's Medications   Discharge Prescriptions    CYCLOBENZAPRINE (FLEXERIL) 10 MG TABLET    Take 1 tablet (10 mg total) by mouth 3 (three) times a day as needed (back pain)       Start Date: 7/11/2020 End Date: --       Order Dose: 10 mg       Quantity: 15 tablet    Refills: 0         PDMP Review     None          ED Provider  Electronically Signed by           Debra Ramos PA-C  07/11/20 1852

## 2020-07-13 ENCOUNTER — TELEPHONE (OUTPATIENT)
Dept: GASTROENTEROLOGY | Facility: CLINIC | Age: 35
End: 2020-07-13

## 2020-07-13 DIAGNOSIS — K58.1 IRRITABLE BOWEL SYNDROME WITH CONSTIPATION: Primary | ICD-10-CM

## 2020-07-14 ENCOUNTER — OFFICE VISIT (OUTPATIENT)
Dept: GASTROENTEROLOGY | Facility: CLINIC | Age: 35
End: 2020-07-14
Payer: COMMERCIAL

## 2020-07-14 VITALS
SYSTOLIC BLOOD PRESSURE: 148 MMHG | DIASTOLIC BLOOD PRESSURE: 88 MMHG | TEMPERATURE: 97 F | HEART RATE: 82 BPM | WEIGHT: 274.8 LBS | HEIGHT: 75 IN | BODY MASS INDEX: 34.17 KG/M2

## 2020-07-14 DIAGNOSIS — K58.1 IRRITABLE BOWEL SYNDROME WITH CONSTIPATION: Primary | ICD-10-CM

## 2020-07-14 DIAGNOSIS — R10.30 LOWER ABDOMINAL PAIN: ICD-10-CM

## 2020-07-14 PROCEDURE — 99213 OFFICE O/P EST LOW 20 MIN: CPT | Performed by: PHYSICIAN ASSISTANT

## 2020-07-14 RX ORDER — NAPROXEN 500 MG/1
TABLET ORAL
COMMUNITY
Start: 2020-07-12

## 2020-07-14 RX ORDER — OXYCODONE HYDROCHLORIDE AND ACETAMINOPHEN 5; 325 MG/1; MG/1
TABLET ORAL
COMMUNITY
Start: 2020-07-12

## 2020-07-14 NOTE — LETTER
July 14, 2020     MD [address]    Patient: Lisette Edmondson   YOB: 1985   Date of Visit: 7/14/2020       Dear Dr Caleb Treviño:    Thank you for referring Elva Abernathy to me for evaluation  Below are my notes for this consultation  If you have questions, please do not hesitate to call me  I look forward to following your patient along with you  Sincerely,        Catrachita Adams PA-C        CC: No Recipients  Catrachita Adams, [place]  7/14/2020 10:04 AM  Sign at close encounter  [address] [facility] - Outpatient Follow-up Note  Lisette Edmondson 37 y o  male MRN: [id number]  Encounter: [id number]          ASSESSMENT AND PLAN:      1  Irritable bowel syndrome with constipation  2  Lower abdominal pain  Patient is going to stop the pharmacy on the way home and  his [employer] prescription  High-fiber diet given and reviewed with patient today  I have encouraged patient to increase his water intake  No plans for colonoscopy at this time  Patient is following up with orthopedics regarding his lower back pain  Patient will follow-up in 6 weeks  ______________________________________________________________________    SUBJECTIVE:   79-year-old male who is here for follow-up of irritable bowel syndrome constipation predominance  Patient reports that he was in the emergency department with lower abdominal pain as well as lower back pain at [facility]  Patient reports that he suffers with severe constipation and he believes that this is contributing to his lower back pain  Patient's x-ray that was done in the emergency department did show evidence of degenerative changes of the spine in the lumbar region  Patient does report pain radiating down his left leg  As for patient's constipation he reports he is taking up to 12 Ducolax to have a bowel movement    He reports that if he does not take a Ducolax tablet he will not have a BM  He has currently failed magnesium citrate, Amitiza, Linzess, and MiraLax  Patient reports that he tries to drink 6 bottles of water a day  He reports that he is not eating enough fiber in his diet  He denies any alarm symptoms  A prescription for Berry Stanislav was sent to his pharmacy but he was told it was going to cost him over 400 dollars  It appears in the system that there was an authorization obtained for this medication yesterday  REVIEW OF SYSTEMS IS OTHERWISE NEGATIVE        Historical Information   Past Medical History:   Diagnosis Date    Asthma     Diabetes mellitus ([address] )     Hypertension     IBS (irritable bowel syndrome)     Nephrolithiasis     Nerve damage     feet, legs, hands     Past Surgical History:   Procedure Laterality Date    DENTAL SURGERY      [place] CYSTO/URETERO W/LITHOTRIPSY &INDWELL STENT INSRT Left 7/26/2017    Procedure: CYSTOSCOPY URETEROSCOPY WITH LITHOTRIPSY HOLMIUM LASER, RETROGRADE PYELOGRAM AND INSERTION STENT URETERAL;  Surgeon: Wilmar Levy MD;  Location: [facility];  Service: Urology     Social History   Social History     Substance and Sexual Activity   Alcohol Use No     Social History     Substance and Sexual Activity   Drug Use No     Social History     Tobacco Use   Smoking Status Never Smoker   Smokeless Tobacco Never Used     Family History   Problem Relation Age of Onset    Diabetes Father     Hypertension Father     Diabetes Maternal Grandfather     Thyroid disease Mother        Meds/Allergies       Current Outpatient Medications:     albuterol (2 5 mg/3 mL) 0 083 % nebulizer solution    ciprofloxacin (CIPRO) 500 mg tablet    cyclobenzaprine (FLEXERIL) 10 mg tablet    dicyclomine (BENTYL) 20 mg tablet    gabapentin (NEURONTIN) 300 mg capsule    glucose blood test strip    Lancets (ONETOUCH ULTRASOFT) lancets    metFORMIN (GLUCOPHAGE) 500 mg tablet    omeprazole (PriLOSEC) 20 mg delayed release capsule    ONETOUCH DELICA LANCETS FINE MISC    albuterol (PROVENTIL HFA,VENTOLIN HFA) 90 mcg/act inhaler    ALBUTEROL IN    Blood Glucose Monitoring Suppl (ACURA BLOOD GLUCOSE METER) w/Device KIT    cyclobenzaprine (FLEXERIL) 10 mg tablet    dicyclomine (BENTYL) 20 mg tablet    hydrocortisone 2 5 % cream    ibuprofen (MOTRIN) 400 mg tablet    ibuprofen (MOTRIN) 800 mg tablet    loratadine (CLARITIN) 10 mg tablet    montelukast (SINGULAIR) 10 mg tablet    naproxen (NAPROSYN) 500 mg tablet    omeprazole (PRILOSEC) 20 mg delayed release capsule    ondansetron (ZOFRAN-ODT) 4 mg disintegrating tablet    oxyCODONE-acetaminophen (PERCOCET) 5-325 mg per tablet    Prucalopride Succinate (Motegrity) 2 MG TABS    Prucalopride Succinate 2 MG TABS    sucralfate (CARAFATE) 1 g/10 mL suspension    Allergies   Allergen Reactions    Amoxicillin Hives    Penicillins Hives           Objective     Blood pressure 148/88, pulse 82, temperature (!) 97 °F (36 1 °C), temperature source Tympanic, height 6' 3" (1 905 m), weight 125 kg (274 lb 12 8 oz)  Body mass index is 34 35 kg/m²  PHYSICAL EXAM:      General Appearance:   Alert, cooperative, no distress   HEENT:   Normocephalic, atraumatic, anicteric      Neck:  Supple, symmetrical, trachea midline   Lungs:   Clear to auscultation bilaterally; no rales, rhonchi or wheezing; respirations unlabored    Heart[de-identified]   Regular rate and rhythm; no murmur, rub, or gallop  Abdomen:   Soft, non-tender, non-distended; normal bowel sounds; no masses, no organomegaly    Genitalia:   Deferred    Rectal:   Deferred    Extremities:  No cyanosis, clubbing or edema    Pulses:  2+ and symmetric    Skin:  No jaundice, rashes, or lesions    Lymph nodes:  No palpable cervical lymphadenopathy        Lab Results:   No visits with results within 1 Day(s) from this visit     Latest known visit with results is:   Admission on 07/11/2020, Discharged on 07/11/2020   Component Date Value    Color, UA 07/11/2020 Yellow     Clarity, UA 07/11/2020 Clear     Specific Gravity, UA 07/11/2020 >=1 030     pH, UA 07/11/2020 6 0     Leukocytes, UA 07/11/2020 Negative     Nitrite, UA 07/11/2020 Negative     Protein, UA 07/11/2020 Negative     Glucose, UA 07/11/2020 Negative     Ketones, UA 07/11/2020 Negative     Urobilinogen, UA 07/11/2020 0 2     Bilirubin, UA 07/11/2020 Negative     Blood, UA 07/11/2020 Negative          Radiology Results:   Ct Abdomen Pelvis With Contrast    Result Date: 6/15/2020  Narrative: CT ABDOMEN AND PELVIS WITH IV CONTRAST INDICATION:   upper abd pain that radiates to lower chest   History of kidney stones  COMPARISON:  February 14, 2020 TECHNIQUE:  CT examination of the abdomen and pelvis was performed  Axial, sagittal, and coronal 2D reformatted images were created from the source data and submitted for interpretation  Radiation dose length product (DLP) for this visit:  1003 mGy-cm   This examination, like all CT scans performed in the [facility], was performed utilizing techniques to minimize radiation dose exposure, including the use of iterative reconstruction and automated exposure control  IV Contrast:  100 mL of iohexol (OMNIPAQUE) Enteric Contrast:  Enteric contrast was not administered  FINDINGS: ABDOMEN LOWER CHEST:  No clinically significant abnormality identified in the visualized lower chest   Stable right lower lobe 4 mm granuloma  LIVER/BILIARY TREE:  Liver is enlarged measuring 22 cm  The liver is diffusely decreased in density consistent with fatty change  No focal hepatic lesions are noted  Hepatic contours are within normal limits  No biliary dilatation  GALLBLADDER:  No calcified gallstones  No pericholecystic inflammatory change  SPLEEN:  Unremarkable  PANCREAS:  Unremarkable  ADRENAL GLANDS:  Unremarkable  KIDNEYS/URETERS:  Bilateral renal calculi  The largest on the right measure 4 mm in the lower pole and 3 mm in the upper pole    Several smaller lower pole right renal calculi and lower pole left renal calculi are noted  No evidence for hydronephrosis  No ureteral calculi  Calculi are stable from the previous study  STOMACH AND BOWEL:  Unremarkable  APPENDIX:  No findings to suggest appendicitis  ABDOMINOPELVIC CAVITY:  No ascites  No pneumoperitoneum  No lymphadenopathy  VESSELS:  Unremarkable for patient's age  PELVIS REPRODUCTIVE ORGANS:  Unremarkable for patient's age  URINARY BLADDER:  Unremarkable  ABDOMINAL WALL/INGUINAL REGIONS:  Unremarkable  OSSEOUS STRUCTURES:  No acute fracture or destructive osseous lesion  Stable hemangioma in the L4 vertebral body       Impression: Stable bilateral nonobstructing renal calculi Hepatomegaly and hepatic steatosis Workstation performed: [id number]

## 2020-07-14 NOTE — PROGRESS NOTES
[facility] - Outpatient Follow-up Note  Laura Lora 37 y o  male MRN: [id number]  Encounter: [id number]          ASSESSMENT AND PLAN:      1  Irritable bowel syndrome with constipation  2  Lower abdominal pain  Patient is going to stop the pharmacy on the way home and  his Motegrity prescription  High-fiber diet given and reviewed with patient today  I have encouraged patient to increase his water intake  No plans for colonoscopy at this time  Patient is following up with orthopedics regarding his lower back pain  Patient will follow-up in 6 weeks  ______________________________________________________________________    SUBJECTIVE:   37-year-old male who is here for follow-up of irritable bowel syndrome constipation predominance  Patient reports that he was in the emergency department with lower abdominal pain as well as lower back pain at [facility]  Patient reports that he suffers with severe constipation and he believes that this is contributing to his lower back pain  Patient's x-ray that was done in the emergency department did show evidence of degenerative changes of the spine in the lumbar region  Patient does report pain radiating down his left leg  As for patient's constipation he reports he is taking up to 12 Ducolax to have a bowel movement  He reports that if he does not take a Ducolax tablet he will not have a BM  He has currently failed magnesium citrate, Amitiza, Linzess, and MiraLax  Patient reports that he tries to drink 6 bottles of water a day  He reports that he is not eating enough fiber in his diet  He denies any alarm symptoms  A prescription for Alicia Mena was sent to his pharmacy but he was told it was going to cost him over 400 dollars  It appears in the system that there was an authorization obtained for this medication yesterday  REVIEW OF SYSTEMS IS OTHERWISE NEGATIVE        Historical Information   Past Medical History:   Diagnosis Date    Asthma     Diabetes mellitus ([address] )     Hypertension     IBS (irritable bowel syndrome)     Nephrolithiasis     Nerve damage     feet, legs, hands     Past Surgical History:   Procedure Laterality Date    DENTAL SURGERY      OK CYSTO/URETERO W/LITHOTRIPSY &INDWELL STENT INSRT Left 7/26/2017    Procedure: CYSTOSCOPY URETEROSCOPY WITH LITHOTRIPSY HOLMIUM LASER, RETROGRADE PYELOGRAM AND INSERTION STENT URETERAL;  Surgeon: Long Hurtado MD;  Location: [facility];  Service: Urology     Social History   Social History     Substance and Sexual Activity   Alcohol Use No     Social History     Substance and Sexual Activity   Drug Use No     Social History     Tobacco Use   Smoking Status Never Smoker   Smokeless Tobacco Never Used     Family History   Problem Relation Age of Onset    Diabetes Father     Hypertension Father     Diabetes Maternal Grandfather     Thyroid disease Mother        Meds/Allergies       Current Outpatient Medications:     albuterol (2 5 mg/3 mL) 0 083 % nebulizer solution    ciprofloxacin (CIPRO) 500 mg tablet    cyclobenzaprine (FLEXERIL) 10 mg tablet    dicyclomine (BENTYL) 20 mg tablet    gabapentin (NEURONTIN) 300 mg capsule    glucose blood test strip    Lancets (ONETOUCH ULTRASOFT) lancets    metFORMIN (GLUCOPHAGE) 500 mg tablet    omeprazole (PriLOSEC) 20 mg delayed release capsule    ONETOUCH DELICA LANCETS FINE MISC    albuterol (PROVENTIL HFA,VENTOLIN HFA) 90 mcg/act inhaler    ALBUTEROL IN    Blood Glucose Monitoring Suppl (ACURA BLOOD GLUCOSE METER) w/Device KIT    cyclobenzaprine (FLEXERIL) 10 mg tablet    dicyclomine (BENTYL) 20 mg tablet    hydrocortisone 2 5 % cream    ibuprofen (MOTRIN) 400 mg tablet    ibuprofen (MOTRIN) 800 mg tablet    loratadine (CLARITIN) 10 mg tablet    montelukast (SINGULAIR) 10 mg tablet    naproxen (NAPROSYN) 500 mg tablet    omeprazole (PRILOSEC) 20 mg delayed release capsule    ondansetron (ZOFRAN-ODT) 4 mg disintegrating tablet    oxyCODONE-acetaminophen (PERCOCET) 5-325 mg per tablet    Prucalopride Succinate (Motegrity) 2 MG TABS    Prucalopride Succinate 2 MG TABS    sucralfate (CARAFATE) 1 g/10 mL suspension    Allergies   Allergen Reactions    Amoxicillin Hives    Penicillins Hives           Objective     Blood pressure 148/88, pulse 82, temperature (!) 97 °F (36 1 °C), temperature source Tympanic, height 6' 3" (1 905 m), weight 125 kg (274 lb 12 8 oz)  Body mass index is 34 35 kg/m²  PHYSICAL EXAM:      General Appearance:   Alert, cooperative, no distress   HEENT:   Normocephalic, atraumatic, anicteric      Neck:  Supple, symmetrical, trachea midline   Lungs:   Clear to auscultation bilaterally; no rales, rhonchi or wheezing; respirations unlabored    Heart[de-identified]   Regular rate and rhythm; no murmur, rub, or gallop  Abdomen:   Soft, non-tender, non-distended; normal bowel sounds; no masses, no organomegaly    Genitalia:   Deferred    Rectal:   Deferred    Extremities:  No cyanosis, clubbing or edema    Pulses:  2+ and symmetric    Skin:  No jaundice, rashes, or lesions    Lymph nodes:  No palpable cervical lymphadenopathy        Lab Results:   No visits with results within 1 Day(s) from this visit     Latest known visit with results is:   Admission on 07/11/2020, Discharged on 07/11/2020   Component Date Value    Color, UA 07/11/2020 Yellow     Clarity, UA 07/11/2020 Clear     Specific Gravity, UA 07/11/2020 >=1 030     pH, UA 07/11/2020 6 0     Leukocytes, UA 07/11/2020 Negative     Nitrite, UA 07/11/2020 Negative     Protein, UA 07/11/2020 Negative     Glucose, UA 07/11/2020 Negative     Ketones, UA 07/11/2020 Negative     Urobilinogen, UA 07/11/2020 0 2     Bilirubin, UA 07/11/2020 Negative     Blood, UA 07/11/2020 Negative          Radiology Results:   Ct Abdomen Pelvis With Contrast    Result Date: 6/15/2020  Narrative: CT ABDOMEN AND PELVIS WITH IV CONTRAST INDICATION:   upper abd pain that radiates to lower chest   History of kidney stones  COMPARISON:  February 14, 2020 TECHNIQUE:  CT examination of the abdomen and pelvis was performed  Axial, sagittal, and coronal 2D reformatted images were created from the source data and submitted for interpretation  Radiation dose length product (DLP) for this visit:  1003 mGy-cm   This examination, like all CT scans performed in the [facility], was performed utilizing techniques to minimize radiation dose exposure, including the use of iterative reconstruction and automated exposure control  IV Contrast:  100 mL of iohexol (OMNIPAQUE) Enteric Contrast:  Enteric contrast was not administered  FINDINGS: ABDOMEN LOWER CHEST:  No clinically significant abnormality identified in the visualized lower chest   Stable right lower lobe 4 mm granuloma  LIVER/BILIARY TREE:  Liver is enlarged measuring 22 cm  The liver is diffusely decreased in density consistent with fatty change  No focal hepatic lesions are noted  Hepatic contours are within normal limits  No biliary dilatation  GALLBLADDER:  No calcified gallstones  No pericholecystic inflammatory change  SPLEEN:  Unremarkable  PANCREAS:  Unremarkable  ADRENAL GLANDS:  Unremarkable  KIDNEYS/URETERS:  Bilateral renal calculi  The largest on the right measure 4 mm in the lower pole and 3 mm in the upper pole  Several smaller lower pole right renal calculi and lower pole left renal calculi are noted  No evidence for hydronephrosis  No ureteral calculi  Calculi are stable from the previous study  STOMACH AND BOWEL:  Unremarkable  APPENDIX:  No findings to suggest appendicitis  ABDOMINOPELVIC CAVITY:  No ascites  No pneumoperitoneum  No lymphadenopathy  VESSELS:  Unremarkable for patient's age  PELVIS REPRODUCTIVE ORGANS:  Unremarkable for patient's age  URINARY BLADDER:  Unremarkable  ABDOMINAL WALL/INGUINAL REGIONS:  Unremarkable   OSSEOUS STRUCTURES:  No acute fracture or destructive osseous lesion  Stable hemangioma in the L4 vertebral body       Impression: Stable bilateral nonobstructing renal calculi Hepatomegaly and hepatic steatosis Workstation performed: [id number]

## 2020-07-14 NOTE — PATIENT INSTRUCTIONS
Constipation   WHAT YOU NEED TO KNOW:   Constipation is when you have hard, dry bowel movements, or you go longer than usual between bowel movements  DISCHARGE INSTRUCTIONS:   Return to the emergency department if:   · You have blood in your bowel movements  · You have a fever and abdominal pain with the constipation  Contact your healthcare provider if:   · Your constipation gets worse  · You start to vomit  · You have questions or concerns about your condition or care  Medicines:   · Medicine or a fiber supplement  may help make your bowel movement softer  A laxative may help relax and loosen your intestines to help you have a bowel movement  You may also be given medicine to increase fluid in your intestines  The fluid may help move bowel movements through your intestines  · Take your medicine as directed  Contact your healthcare provider if you think your medicine is not helping or if you have side effects  Tell him of her if you are allergic to any medicine  Keep a list of the medicines, vitamins, and herbs you take  Include the amounts, and when and why you take them  Bring the list or the pill bottles to follow-up visits  Carry your medicine list with you in case of an emergency  Manage your constipation:   · Drink liquids as directed  You may need to drink extra liquids to help soften and move your bowels  Ask how much liquid to drink each day and which liquids are best for you  · Eat high-fiber foods  This may help decrease constipation by adding bulk to your bowel movements  High-fiber foods include fruit, vegetables, whole-grain breads and cereals, and beans  Your healthcare provider or dietitian can help you create a high-fiber meal plan  · Exercise regularly  Regular physical activity can help stimulate your intestines  Ask which exercises are best for you  · Schedule a time each day to have a bowel movement    This may help train your body to have regular bowel movements  Bend forward while you are on the toilet to help move the bowel movement out  Sit on the toilet for at least 10 minutes, even if you do not have a bowel movement  Follow up with your healthcare provider as directed:  Write down your questions so you remember to ask them during your visits  © 2017 [address] Information is for End User's use only and may not be sold, redistributed or otherwise used for commercial purposes  All illustrations and images included in CareNotes® are the copyrighted property of [employer]  or Sacha Gamboa  The above information is an  only  It is not intended as medical advice for individual conditions or treatments  Talk to your doctor, nurse or pharmacist before following any medical regimen to see if it is safe and effective for you

## 2020-07-15 ENCOUNTER — TELEPHONE (OUTPATIENT)
Dept: GASTROENTEROLOGY | Facility: CLINIC | Age: 35
End: 2020-07-15

## 2020-07-15 ENCOUNTER — TELEPHONE (OUTPATIENT)
Dept: PHYSICAL THERAPY | Facility: OTHER | Age: 35
End: 2020-07-15

## 2020-07-15 NOTE — TELEPHONE ENCOUNTER
Called patient per referral     Voice message left for patient to call back  Phone number and hours of business provided  This is the 1st attempt to reach the patient  Will defer per protocol

## 2020-07-31 ENCOUNTER — TELEPHONE (OUTPATIENT)
Dept: PHYSICAL THERAPY | Facility: OTHER | Age: 35
End: 2020-07-31

## 2020-07-31 NOTE — TELEPHONE ENCOUNTER
Called per referral     Voice message left for patient to call back  Phone number and hours of business provided  This is the 2nd attempt to reach the patient  Referral closed

## 2023-11-01 NOTE — CASE MANAGEMENT
Continued Stay Review    Date: 7/27/17       WAS OBS 7/25/17 @1653, CHANGED TO INPATIENT 7/26/17 @2110  07/26/17 2110  Inpatient Admission Once     Transfer Service: General Medicine       Question Answer Comment   Admitting Physician FREDDIE WANG    Level of Care Med Surg    Estimated length of stay More than 2 Midnights    Certification I certify that inpatient services are medically necessary for this patient for a duration of greater than two midnights  See H&P and MD Progress Notes for additional information about the patient's course of treatment  07/26/17 2109     INPATIENT DETERMINATION GIVEN PER PHYSICIAN ADVISOR  Vital Signs: /65   Pulse 75   Temp 98 8 °F (37 1 °C) (Oral)   Resp 18   Ht 6' 3" (1 905 m)   Wt 134 kg (296 lb)   SpO2 97%   BMI 37 00 kg/m²     Medications:   Scheduled Meds:   ciprofloxacin 500 mg Oral BID   heparin (porcine) 5,000 Units Subcutaneous Q8H [address]   senna 1 tablet Oral BID   tamsulosin 0 4 mg Oral Daily With Dinner     Continuous Infusions:   sodium chloride 150 mL/hr Last Rate: 150 mL/hr (07/27/17 0911)     PRN Meds:   acetaminophen    HYDROmorphone    ondansetron    oxyCODONE-acetaminophen    Abnormal Labs/Diagnostic Results:   7/27: WBC 12 06   HGB 11 6   HCT 35 9     URINE C&S PENDING    Age/Sex: 29 y o  male     Assessment/Plan:   TO OR 7/26 @2833:  Procedure: Cystoscopy, left ureteroscopy  Anesthesia Type:   General    PER MED 7/27/17:  Plan for the Primary Problem(s): ? Left ureteric stone s/p removal;  on cipro; urology following and on flomax;   ? asthma-currently stable  ? Acute kidney injury   Cr normal  Accelerated hypertension-bp improved    Discharge Plan: POSSIBLE DISCHARGE TODAY Detail Level: Detailed Size Of Lesion In Cm: 1.7 Size Of Lesion After Curettage: 2.2 Anesthesia Type: 1% lidocaine without epinephrine Cautery Type: thermocautery Number Of Curettages: 3 What Was Performed First?: Curettage Additional Information: (Optional): The wound was cleaned, and a pressure dressing was applied.  The patient received detailed post-op instructions. Lab: [id number] Lab Facility: [id number] Histology Text: Following the procedure a portion of the curetted material was sent for histologic evaluation. Biopsy Type: H and E Path Notes (To The Dermatopathologist): Bcc vs ? Render Path Notes In Note?: No Consent was obtained from the patient. The risks, benefits and alternatives to therapy were discussed in detail. Specifically, the risks of infection, scarring, bleeding, prolonged wound healing, nerve injury, incomplete removal, allergy to anesthesia and recurrence were addressed. Alternatives to ED&C, such as: surgical removal and XRT were also discussed.  Prior to the procedure, the treatment site was clearly identified and confirmed by the patient. All components of Universal Protocol/PAUSE Rule completed. Post-Care Instructions: I reviewed with the patient in detail post-care instructions. Patient is to keep the area dry for 48 hours, and not to engage in any swimming until the area is healed. Should the patient develop any fevers, chills, bleeding, severe pain patient will contact the office immediately. Billing Type: Third-Party Bill Bill As A Line Item Or As Units: Line Item

## 2024-09-04 NOTE — PROGRESS NOTES
Lizzy Isabel underwent an uneventful left ureteroscopy to remove his left proximal ureteral calculus  He has a left ureteral stent in place  On Monday morning July 31, 2017, the patient can remove his own stents  The patient is cleared for discharge from a urologic standpoint as long as vital signs remained stable, pain is controlled, the patient is tolerating a diet, and he can void  Plan for discharge home with Norco as needed and Cipro 500 mg twice daily for 5 days  Follow-up in the outpatient setting with Dr Dahiana Alexandra  Patient scheduled follow-up for 11/19/24.  Dr. Nunn- can we refill Ocp to last until then?

## (undated) DEVICE — SHEATH URETERAL ACCESS 12/14FR 35CM PROXIS

## (undated) DEVICE — 3M™ TEGADERM™ TRANSPARENT FILM DRESSING FRAME STYLE, 1624W, 2-3/8 IN X 2-3/4 IN (6 CM X 7 CM), 100/CT 4CT/CASE: Brand: 3M™ TEGADERM™

## (undated) DEVICE — PACK TUR

## (undated) DEVICE — GLOVE INDICATOR PI UNDERGLOVE SZ 8 BLUE

## (undated) DEVICE — GUIDEWIRE STRGHT TIP 0.035 IN  SOLO PLUS

## (undated) DEVICE — LASER FIBER HOLMIUM 272MICRON

## (undated) DEVICE — GLOVE SRG BIOGEL ECLIPSE 7.5

## (undated) DEVICE — LIGHT HANDLE COVER CAMERA DISP

## (undated) DEVICE — SPECIMEN CONTAINER STERILE PEEL PACK

## (undated) DEVICE — SCD SEQUENTIAL COMPRESSION COMFORT SLEEVE MEDIUM KNEE LENGTH: Brand: KENDALL SCD